# Patient Record
Sex: MALE | Race: WHITE | NOT HISPANIC OR LATINO | Employment: UNEMPLOYED | URBAN - METROPOLITAN AREA
[De-identification: names, ages, dates, MRNs, and addresses within clinical notes are randomized per-mention and may not be internally consistent; named-entity substitution may affect disease eponyms.]

---

## 2017-01-06 ENCOUNTER — COMMUNICATION - HEALTHEAST (OUTPATIENT)
Dept: FAMILY MEDICINE | Facility: CLINIC | Age: 57
End: 2017-01-06

## 2017-01-06 DIAGNOSIS — F32.A ANXIETY AND DEPRESSION: ICD-10-CM

## 2017-01-06 DIAGNOSIS — F41.9 ANXIETY AND DEPRESSION: ICD-10-CM

## 2017-01-06 DIAGNOSIS — E78.5 HYPERLIPIDEMIA: ICD-10-CM

## 2017-01-06 DIAGNOSIS — E11.9 DIABETES MELLITUS (H): ICD-10-CM

## 2017-01-09 ENCOUNTER — COMMUNICATION - HEALTHEAST (OUTPATIENT)
Dept: NURSING | Facility: CLINIC | Age: 57
End: 2017-01-09

## 2017-01-09 DIAGNOSIS — Z86.718 HISTORY OF DVT (DEEP VEIN THROMBOSIS): ICD-10-CM

## 2017-01-13 ENCOUNTER — AMBULATORY - HEALTHEAST (OUTPATIENT)
Dept: NURSING | Facility: CLINIC | Age: 57
End: 2017-01-13

## 2017-01-16 ENCOUNTER — COMMUNICATION - HEALTHEAST (OUTPATIENT)
Dept: NURSING | Facility: CLINIC | Age: 57
End: 2017-01-16

## 2017-01-16 DIAGNOSIS — Z86.718 HISTORY OF DVT (DEEP VEIN THROMBOSIS): ICD-10-CM

## 2017-01-19 ENCOUNTER — RECORDS - HEALTHEAST (OUTPATIENT)
Dept: ADMINISTRATIVE | Facility: OTHER | Age: 57
End: 2017-01-19

## 2017-01-22 ENCOUNTER — COMMUNICATION - HEALTHEAST (OUTPATIENT)
Dept: FAMILY MEDICINE | Facility: CLINIC | Age: 57
End: 2017-01-22

## 2017-01-27 ENCOUNTER — AMBULATORY - HEALTHEAST (OUTPATIENT)
Dept: FAMILY MEDICINE | Facility: CLINIC | Age: 57
End: 2017-01-27

## 2017-01-27 ENCOUNTER — COMMUNICATION - HEALTHEAST (OUTPATIENT)
Dept: INTERNAL MEDICINE | Facility: CLINIC | Age: 57
End: 2017-01-27

## 2017-01-27 DIAGNOSIS — Z86.718 HISTORY OF DVT (DEEP VEIN THROMBOSIS): ICD-10-CM

## 2017-01-27 DIAGNOSIS — Z79.899 MEDICATION MANAGEMENT: ICD-10-CM

## 2017-02-02 ENCOUNTER — COMMUNICATION - HEALTHEAST (OUTPATIENT)
Dept: FAMILY MEDICINE | Facility: CLINIC | Age: 57
End: 2017-02-02

## 2017-02-02 DIAGNOSIS — E11.9 DIABETES MELLITUS (H): ICD-10-CM

## 2017-02-06 ENCOUNTER — COMMUNICATION - HEALTHEAST (OUTPATIENT)
Dept: FAMILY MEDICINE | Facility: CLINIC | Age: 57
End: 2017-02-06

## 2017-02-06 DIAGNOSIS — F41.9 ANXIETY AND DEPRESSION: ICD-10-CM

## 2017-02-06 DIAGNOSIS — F32.A ANXIETY AND DEPRESSION: ICD-10-CM

## 2017-02-07 ENCOUNTER — AMBULATORY - HEALTHEAST (OUTPATIENT)
Dept: FAMILY MEDICINE | Facility: CLINIC | Age: 57
End: 2017-02-07

## 2017-02-07 DIAGNOSIS — E78.2 MIXED HYPERLIPIDEMIA: ICD-10-CM

## 2017-02-07 DIAGNOSIS — E11.8 TYPE 2 DIABETES MELLITUS WITH COMPLICATION, WITHOUT LONG-TERM CURRENT USE OF INSULIN (H): ICD-10-CM

## 2017-02-10 ENCOUNTER — COMMUNICATION - HEALTHEAST (OUTPATIENT)
Dept: INTERNAL MEDICINE | Facility: CLINIC | Age: 57
End: 2017-02-10

## 2017-02-13 ENCOUNTER — AMBULATORY - HEALTHEAST (OUTPATIENT)
Dept: NURSING | Facility: CLINIC | Age: 57
End: 2017-02-13

## 2017-02-13 ENCOUNTER — COMMUNICATION - HEALTHEAST (OUTPATIENT)
Dept: NURSING | Facility: CLINIC | Age: 57
End: 2017-02-13

## 2017-02-13 DIAGNOSIS — Z86.718 HISTORY OF DVT (DEEP VEIN THROMBOSIS): ICD-10-CM

## 2017-02-22 ENCOUNTER — RECORDS - HEALTHEAST (OUTPATIENT)
Dept: ADMINISTRATIVE | Facility: OTHER | Age: 57
End: 2017-02-22

## 2017-02-24 ENCOUNTER — COMMUNICATION - HEALTHEAST (OUTPATIENT)
Dept: FAMILY MEDICINE | Facility: CLINIC | Age: 57
End: 2017-02-24

## 2017-02-24 DIAGNOSIS — Z86.718 HISTORY OF DVT (DEEP VEIN THROMBOSIS): ICD-10-CM

## 2017-03-02 ENCOUNTER — COMMUNICATION - HEALTHEAST (OUTPATIENT)
Dept: NURSING | Facility: CLINIC | Age: 57
End: 2017-03-02

## 2017-03-02 DIAGNOSIS — E11.9 DIABETES MELLITUS (H): ICD-10-CM

## 2017-03-13 ENCOUNTER — COMMUNICATION - HEALTHEAST (OUTPATIENT)
Dept: NURSING | Facility: CLINIC | Age: 57
End: 2017-03-13

## 2017-03-13 DIAGNOSIS — Z86.718 HISTORY OF DVT (DEEP VEIN THROMBOSIS): ICD-10-CM

## 2017-03-14 ENCOUNTER — AMBULATORY - HEALTHEAST (OUTPATIENT)
Dept: NURSING | Facility: CLINIC | Age: 57
End: 2017-03-14

## 2017-03-22 ENCOUNTER — COMMUNICATION - HEALTHEAST (OUTPATIENT)
Dept: NURSING | Facility: CLINIC | Age: 57
End: 2017-03-22

## 2017-03-22 DIAGNOSIS — E78.5 DYSLIPIDEMIA: ICD-10-CM

## 2017-03-23 ENCOUNTER — COMMUNICATION - HEALTHEAST (OUTPATIENT)
Dept: NURSING | Facility: CLINIC | Age: 57
End: 2017-03-23

## 2017-03-23 DIAGNOSIS — Z86.718 HISTORY OF DVT (DEEP VEIN THROMBOSIS): ICD-10-CM

## 2017-03-28 ENCOUNTER — COMMUNICATION - HEALTHEAST (OUTPATIENT)
Dept: NURSING | Facility: CLINIC | Age: 57
End: 2017-03-28

## 2017-03-28 DIAGNOSIS — E11.9 DIABETES MELLITUS (H): ICD-10-CM

## 2017-04-06 ENCOUNTER — OFFICE VISIT - HEALTHEAST (OUTPATIENT)
Dept: FAMILY MEDICINE | Facility: CLINIC | Age: 57
End: 2017-04-06

## 2017-04-06 DIAGNOSIS — E11.8 TYPE 2 DIABETES MELLITUS WITH COMPLICATION, WITHOUT LONG-TERM CURRENT USE OF INSULIN (H): ICD-10-CM

## 2017-04-06 DIAGNOSIS — E78.2 MIXED HYPERLIPIDEMIA: ICD-10-CM

## 2017-04-06 DIAGNOSIS — Z12.5 PROSTATE CANCER SCREENING: ICD-10-CM

## 2017-04-06 DIAGNOSIS — I10 HTN (HYPERTENSION): ICD-10-CM

## 2017-04-06 DIAGNOSIS — Z86.718 HISTORY OF DVT (DEEP VEIN THROMBOSIS): ICD-10-CM

## 2017-04-06 DIAGNOSIS — F33.9 MAJOR DEPRESSIVE DISORDER, RECURRENT EPISODE (H): ICD-10-CM

## 2017-04-06 DIAGNOSIS — Z23 NEED FOR TD VACCINE: ICD-10-CM

## 2017-04-06 DIAGNOSIS — E53.8 OTHER B-COMPLEX DEFICIENCIES: ICD-10-CM

## 2017-04-06 DIAGNOSIS — R05.9 COUGH: ICD-10-CM

## 2017-04-06 DIAGNOSIS — G89.4 CHRONIC PAIN SYNDROME: ICD-10-CM

## 2017-04-06 LAB
CHOLEST SERPL-MCNC: 177 MG/DL
FASTING STATUS PATIENT QL REPORTED: YES
HBA1C MFR BLD: 8 % (ref 3.5–6.1)
HDLC SERPL-MCNC: 51 MG/DL
LDLC SERPL CALC-MCNC: 101 MG/DL
PSA SERPL-MCNC: 0.5 NG/ML (ref 0–3.5)
TRIGL SERPL-MCNC: 124 MG/DL

## 2017-04-07 ENCOUNTER — OFFICE VISIT - HEALTHEAST (OUTPATIENT)
Dept: NURSING | Facility: CLINIC | Age: 57
End: 2017-04-07

## 2017-04-07 ENCOUNTER — COMMUNICATION - HEALTHEAST (OUTPATIENT)
Dept: FAMILY MEDICINE | Facility: CLINIC | Age: 57
End: 2017-04-07

## 2017-04-07 DIAGNOSIS — I10 ESSENTIAL HYPERTENSION WITH GOAL BLOOD PRESSURE LESS THAN 140/90: ICD-10-CM

## 2017-04-07 DIAGNOSIS — Z86.718 HISTORY OF DVT (DEEP VEIN THROMBOSIS): ICD-10-CM

## 2017-04-07 DIAGNOSIS — E78.2 MIXED HYPERLIPIDEMIA: ICD-10-CM

## 2017-04-07 DIAGNOSIS — G89.4 CHRONIC PAIN SYNDROME: ICD-10-CM

## 2017-04-07 DIAGNOSIS — F33.9 EPISODE OF RECURRENT MAJOR DEPRESSIVE DISORDER, UNSPECIFIED DEPRESSION EPISODE SEVERITY (H): ICD-10-CM

## 2017-04-07 DIAGNOSIS — E11.8 TYPE 2 DIABETES MELLITUS WITH COMPLICATION, WITHOUT LONG-TERM CURRENT USE OF INSULIN (H): ICD-10-CM

## 2017-04-10 ENCOUNTER — COMMUNICATION - HEALTHEAST (OUTPATIENT)
Dept: NURSING | Facility: CLINIC | Age: 57
End: 2017-04-10

## 2017-04-10 DIAGNOSIS — Z86.718 HISTORY OF DVT (DEEP VEIN THROMBOSIS): ICD-10-CM

## 2017-04-12 ENCOUNTER — COMMUNICATION - HEALTHEAST (OUTPATIENT)
Dept: NURSING | Facility: CLINIC | Age: 57
End: 2017-04-12

## 2017-04-12 DIAGNOSIS — F32.A ANXIETY AND DEPRESSION: ICD-10-CM

## 2017-04-12 DIAGNOSIS — F41.9 ANXIETY AND DEPRESSION: ICD-10-CM

## 2017-04-14 ENCOUNTER — OFFICE VISIT - HEALTHEAST (OUTPATIENT)
Dept: NURSING | Facility: CLINIC | Age: 57
End: 2017-04-14

## 2017-04-14 ENCOUNTER — COMMUNICATION - HEALTHEAST (OUTPATIENT)
Dept: NURSING | Facility: CLINIC | Age: 57
End: 2017-04-14

## 2017-04-14 ENCOUNTER — AMBULATORY - HEALTHEAST (OUTPATIENT)
Dept: FAMILY MEDICINE | Facility: CLINIC | Age: 57
End: 2017-04-14

## 2017-04-14 DIAGNOSIS — E11.8 TYPE 2 DIABETES MELLITUS WITH COMPLICATION, WITHOUT LONG-TERM CURRENT USE OF INSULIN (H): ICD-10-CM

## 2017-04-14 DIAGNOSIS — Z86.718 HISTORY OF DVT (DEEP VEIN THROMBOSIS): ICD-10-CM

## 2017-04-17 ENCOUNTER — COMMUNICATION - HEALTHEAST (OUTPATIENT)
Dept: INTERNAL MEDICINE | Facility: CLINIC | Age: 57
End: 2017-04-17

## 2017-04-17 DIAGNOSIS — I73.9 PERIPHERAL VASCULAR DISEASE, UNSPECIFIED (H): ICD-10-CM

## 2017-04-17 DIAGNOSIS — Z86.718 HISTORY OF DVT (DEEP VEIN THROMBOSIS): ICD-10-CM

## 2017-04-26 ENCOUNTER — COMMUNICATION - HEALTHEAST (OUTPATIENT)
Dept: NURSING | Facility: CLINIC | Age: 57
End: 2017-04-26

## 2017-04-26 DIAGNOSIS — E11.9 DIABETES MELLITUS (H): ICD-10-CM

## 2017-04-27 ENCOUNTER — COMMUNICATION - HEALTHEAST (OUTPATIENT)
Dept: NURSING | Facility: CLINIC | Age: 57
End: 2017-04-27

## 2017-04-27 DIAGNOSIS — Z86.718 HISTORY OF DVT (DEEP VEIN THROMBOSIS): ICD-10-CM

## 2017-04-28 ENCOUNTER — OFFICE VISIT - HEALTHEAST (OUTPATIENT)
Dept: NURSING | Facility: CLINIC | Age: 57
End: 2017-04-28

## 2017-04-28 DIAGNOSIS — E11.8 TYPE 2 DIABETES MELLITUS WITH COMPLICATION, WITHOUT LONG-TERM CURRENT USE OF INSULIN (H): ICD-10-CM

## 2017-04-28 DIAGNOSIS — E78.2 MIXED HYPERLIPIDEMIA: ICD-10-CM

## 2017-04-28 DIAGNOSIS — G89.4 CHRONIC PAIN SYNDROME: ICD-10-CM

## 2017-04-28 DIAGNOSIS — I10 ESSENTIAL HYPERTENSION WITH GOAL BLOOD PRESSURE LESS THAN 140/90: ICD-10-CM

## 2017-05-12 ENCOUNTER — COMMUNICATION - HEALTHEAST (OUTPATIENT)
Dept: NURSING | Facility: CLINIC | Age: 57
End: 2017-05-12

## 2017-05-12 DIAGNOSIS — Z86.718 HISTORY OF DVT (DEEP VEIN THROMBOSIS): ICD-10-CM

## 2017-05-15 ENCOUNTER — AMBULATORY - HEALTHEAST (OUTPATIENT)
Dept: NURSING | Facility: CLINIC | Age: 57
End: 2017-05-15

## 2017-05-16 ENCOUNTER — COMMUNICATION - HEALTHEAST (OUTPATIENT)
Dept: FAMILY MEDICINE | Facility: CLINIC | Age: 57
End: 2017-05-16

## 2017-05-24 ENCOUNTER — COMMUNICATION - HEALTHEAST (OUTPATIENT)
Dept: FAMILY MEDICINE | Facility: CLINIC | Age: 57
End: 2017-05-24

## 2017-05-24 DIAGNOSIS — E11.9 DIABETES MELLITUS (H): ICD-10-CM

## 2017-05-31 ENCOUNTER — COMMUNICATION - HEALTHEAST (OUTPATIENT)
Dept: FAMILY MEDICINE | Facility: CLINIC | Age: 57
End: 2017-05-31

## 2017-05-31 DIAGNOSIS — E78.5 DYSLIPIDEMIA: ICD-10-CM

## 2017-06-02 ENCOUNTER — COMMUNICATION - HEALTHEAST (OUTPATIENT)
Dept: INTERNAL MEDICINE | Facility: CLINIC | Age: 57
End: 2017-06-02

## 2017-06-07 ENCOUNTER — COMMUNICATION - HEALTHEAST (OUTPATIENT)
Dept: NURSING | Facility: CLINIC | Age: 57
End: 2017-06-07

## 2017-06-07 DIAGNOSIS — Z86.718 HISTORY OF DVT (DEEP VEIN THROMBOSIS): ICD-10-CM

## 2017-06-28 ENCOUNTER — COMMUNICATION - HEALTHEAST (OUTPATIENT)
Dept: INTERNAL MEDICINE | Facility: CLINIC | Age: 57
End: 2017-06-28

## 2017-07-10 ENCOUNTER — RECORDS - HEALTHEAST (OUTPATIENT)
Dept: ADMINISTRATIVE | Facility: OTHER | Age: 57
End: 2017-07-10

## 2017-07-13 ENCOUNTER — COMMUNICATION - HEALTHEAST (OUTPATIENT)
Dept: FAMILY MEDICINE | Facility: CLINIC | Age: 57
End: 2017-07-13

## 2017-07-13 ENCOUNTER — OFFICE VISIT - HEALTHEAST (OUTPATIENT)
Dept: FAMILY MEDICINE | Facility: CLINIC | Age: 57
End: 2017-07-13

## 2017-07-13 ENCOUNTER — COMMUNICATION - HEALTHEAST (OUTPATIENT)
Dept: INTERNAL MEDICINE | Facility: CLINIC | Age: 57
End: 2017-07-13

## 2017-07-13 ENCOUNTER — RECORDS - HEALTHEAST (OUTPATIENT)
Dept: GENERAL RADIOLOGY | Facility: CLINIC | Age: 57
End: 2017-07-13

## 2017-07-13 DIAGNOSIS — J45.20 MILD INTERMITTENT ASTHMA WITHOUT COMPLICATION: ICD-10-CM

## 2017-07-13 DIAGNOSIS — E11.8 TYPE 2 DIABETES MELLITUS WITH COMPLICATION, WITHOUT LONG-TERM CURRENT USE OF INSULIN (H): ICD-10-CM

## 2017-07-13 DIAGNOSIS — Z86.718 HISTORY OF DVT (DEEP VEIN THROMBOSIS): ICD-10-CM

## 2017-07-13 DIAGNOSIS — I73.9 PERIPHERAL VASCULAR DISEASE, UNSPECIFIED (H): ICD-10-CM

## 2017-07-13 DIAGNOSIS — K62.3 RECTAL PROLAPSE: ICD-10-CM

## 2017-07-13 DIAGNOSIS — E53.8 B12 DEFICIENCY: ICD-10-CM

## 2017-07-13 DIAGNOSIS — E78.2 MIXED HYPERLIPIDEMIA: ICD-10-CM

## 2017-07-13 DIAGNOSIS — G47.00 INSOMNIA, UNSPECIFIED: ICD-10-CM

## 2017-07-13 DIAGNOSIS — E11.9 DIABETES (H): ICD-10-CM

## 2017-07-13 DIAGNOSIS — F10.20 ETOHISM (H): ICD-10-CM

## 2017-07-13 DIAGNOSIS — M25.572 ANKLE PAIN, LEFT: ICD-10-CM

## 2017-07-13 DIAGNOSIS — I10 HTN (HYPERTENSION): ICD-10-CM

## 2017-07-13 DIAGNOSIS — G89.4 CHRONIC PAIN SYNDROME: ICD-10-CM

## 2017-07-13 DIAGNOSIS — M25.572 PAIN IN LEFT ANKLE AND JOINTS OF LEFT FOOT: ICD-10-CM

## 2017-07-13 DIAGNOSIS — F33.9 MAJOR DEPRESSIVE DISORDER, RECURRENT EPISODE (H): ICD-10-CM

## 2017-07-13 LAB
CHOLEST SERPL-MCNC: 188 MG/DL
FASTING STATUS PATIENT QL REPORTED: YES
HBA1C MFR BLD: 6.1 % (ref 3.5–6.1)
HDLC SERPL-MCNC: 52 MG/DL
LDLC SERPL CALC-MCNC: 119 MG/DL
TRIGL SERPL-MCNC: 87 MG/DL

## 2017-07-26 ENCOUNTER — COMMUNICATION - HEALTHEAST (OUTPATIENT)
Dept: INTERNAL MEDICINE | Facility: CLINIC | Age: 57
End: 2017-07-26

## 2017-07-26 DIAGNOSIS — Z86.718 HISTORY OF DVT (DEEP VEIN THROMBOSIS): ICD-10-CM

## 2017-07-27 ENCOUNTER — COMMUNICATION - HEALTHEAST (OUTPATIENT)
Dept: FAMILY MEDICINE | Facility: CLINIC | Age: 57
End: 2017-07-27

## 2017-08-08 ENCOUNTER — COMMUNICATION - HEALTHEAST (OUTPATIENT)
Dept: INTERNAL MEDICINE | Facility: CLINIC | Age: 57
End: 2017-08-08

## 2017-08-08 DIAGNOSIS — Z86.718 HISTORY OF DVT (DEEP VEIN THROMBOSIS): ICD-10-CM

## 2017-08-29 ENCOUNTER — COMMUNICATION - HEALTHEAST (OUTPATIENT)
Dept: FAMILY MEDICINE | Facility: CLINIC | Age: 57
End: 2017-08-29

## 2017-08-30 ENCOUNTER — COMMUNICATION - HEALTHEAST (OUTPATIENT)
Dept: FAMILY MEDICINE | Facility: CLINIC | Age: 57
End: 2017-08-30

## 2017-08-30 DIAGNOSIS — Z86.718 HISTORY OF DVT (DEEP VEIN THROMBOSIS): ICD-10-CM

## 2017-09-11 ENCOUNTER — COMMUNICATION - HEALTHEAST (OUTPATIENT)
Dept: FAMILY MEDICINE | Facility: CLINIC | Age: 57
End: 2017-09-11

## 2017-09-13 ENCOUNTER — COMMUNICATION - HEALTHEAST (OUTPATIENT)
Dept: NURSING | Facility: CLINIC | Age: 57
End: 2017-09-13

## 2017-09-13 DIAGNOSIS — Z86.718 HISTORY OF DVT (DEEP VEIN THROMBOSIS): ICD-10-CM

## 2017-09-21 ENCOUNTER — COMMUNICATION - HEALTHEAST (OUTPATIENT)
Dept: FAMILY MEDICINE | Facility: CLINIC | Age: 57
End: 2017-09-21

## 2017-09-27 ENCOUNTER — COMMUNICATION - HEALTHEAST (OUTPATIENT)
Dept: FAMILY MEDICINE | Facility: CLINIC | Age: 57
End: 2017-09-27

## 2017-09-27 DIAGNOSIS — F41.9 ANXIETY AND DEPRESSION: ICD-10-CM

## 2017-09-27 DIAGNOSIS — F32.A ANXIETY AND DEPRESSION: ICD-10-CM

## 2017-09-28 ENCOUNTER — COMMUNICATION - HEALTHEAST (OUTPATIENT)
Dept: NURSING | Facility: CLINIC | Age: 57
End: 2017-09-28

## 2017-09-28 DIAGNOSIS — Z86.718 HISTORY OF DVT (DEEP VEIN THROMBOSIS): ICD-10-CM

## 2017-09-29 ENCOUNTER — AMBULATORY - HEALTHEAST (OUTPATIENT)
Dept: NURSING | Facility: CLINIC | Age: 57
End: 2017-09-29

## 2017-10-03 ENCOUNTER — COMMUNICATION - HEALTHEAST (OUTPATIENT)
Dept: NURSING | Facility: CLINIC | Age: 57
End: 2017-10-03

## 2017-10-03 ENCOUNTER — RECORDS - HEALTHEAST (OUTPATIENT)
Dept: ADMINISTRATIVE | Facility: OTHER | Age: 57
End: 2017-10-03

## 2017-10-03 DIAGNOSIS — I10 ESSENTIAL HYPERTENSION: ICD-10-CM

## 2017-10-16 ENCOUNTER — COMMUNICATION - HEALTHEAST (OUTPATIENT)
Dept: NURSING | Facility: CLINIC | Age: 57
End: 2017-10-16

## 2017-10-16 DIAGNOSIS — Z86.718 HISTORY OF DVT (DEEP VEIN THROMBOSIS): ICD-10-CM

## 2017-10-16 DIAGNOSIS — E11.9 DIABETES MELLITUS (H): ICD-10-CM

## 2017-10-16 DIAGNOSIS — E11.8 TYPE 2 DIABETES MELLITUS WITH COMPLICATION, WITHOUT LONG-TERM CURRENT USE OF INSULIN (H): ICD-10-CM

## 2017-10-17 ENCOUNTER — COMMUNICATION - HEALTHEAST (OUTPATIENT)
Dept: NURSING | Facility: CLINIC | Age: 57
End: 2017-10-17

## 2017-10-18 RX ORDER — LANCING DEVICE/LANCETS
KIT MISCELLANEOUS
Qty: 102 EACH | Refills: 3 | Status: SHIPPED | OUTPATIENT
Start: 2017-10-18

## 2017-10-18 RX ORDER — BLOOD SUGAR DIAGNOSTIC
STRIP MISCELLANEOUS
Qty: 100 STRIP | Refills: 3 | Status: SHIPPED | OUTPATIENT
Start: 2017-10-18

## 2017-10-25 ENCOUNTER — RECORDS - HEALTHEAST (OUTPATIENT)
Dept: ADMINISTRATIVE | Facility: OTHER | Age: 57
End: 2017-10-25

## 2017-11-06 ENCOUNTER — COMMUNICATION - HEALTHEAST (OUTPATIENT)
Dept: INTERNAL MEDICINE | Facility: CLINIC | Age: 57
End: 2017-11-06

## 2017-11-09 ENCOUNTER — COMMUNICATION - HEALTHEAST (OUTPATIENT)
Dept: NURSING | Facility: CLINIC | Age: 57
End: 2017-11-09

## 2017-11-09 DIAGNOSIS — Z86.718 HISTORY OF DVT (DEEP VEIN THROMBOSIS): ICD-10-CM

## 2017-11-18 ENCOUNTER — COMMUNICATION - HEALTHEAST (OUTPATIENT)
Dept: NURSING | Facility: CLINIC | Age: 57
End: 2017-11-18

## 2017-11-22 ENCOUNTER — COMMUNICATION - HEALTHEAST (OUTPATIENT)
Dept: NURSING | Facility: CLINIC | Age: 57
End: 2017-11-22

## 2017-11-22 DIAGNOSIS — Z86.718 HISTORY OF DVT (DEEP VEIN THROMBOSIS): ICD-10-CM

## 2017-12-06 ENCOUNTER — COMMUNICATION - HEALTHEAST (OUTPATIENT)
Dept: NURSING | Facility: CLINIC | Age: 57
End: 2017-12-06

## 2017-12-06 DIAGNOSIS — Z86.718 HISTORY OF DVT (DEEP VEIN THROMBOSIS): ICD-10-CM

## 2017-12-19 ENCOUNTER — COMMUNICATION - HEALTHEAST (OUTPATIENT)
Dept: NURSING | Facility: CLINIC | Age: 57
End: 2017-12-19

## 2017-12-19 DIAGNOSIS — E78.5 DYSLIPIDEMIA: ICD-10-CM

## 2017-12-20 ENCOUNTER — OFFICE VISIT - HEALTHEAST (OUTPATIENT)
Dept: FAMILY MEDICINE | Facility: CLINIC | Age: 57
End: 2017-12-20

## 2017-12-20 DIAGNOSIS — G89.4 CHRONIC PAIN SYNDROME: ICD-10-CM

## 2017-12-20 DIAGNOSIS — G62.9 NEUROPATHY: ICD-10-CM

## 2017-12-20 DIAGNOSIS — E11.9 DM (DIABETES MELLITUS) (H): ICD-10-CM

## 2017-12-20 DIAGNOSIS — I10 ESSENTIAL HYPERTENSION: ICD-10-CM

## 2017-12-20 DIAGNOSIS — E11.8 TYPE 2 DIABETES MELLITUS WITH COMPLICATION, WITHOUT LONG-TERM CURRENT USE OF INSULIN (H): ICD-10-CM

## 2017-12-20 DIAGNOSIS — Z23 NEED FOR IMMUNIZATION AGAINST INFLUENZA: ICD-10-CM

## 2017-12-20 LAB — HBA1C MFR BLD: 6.8 % (ref 3.5–6.1)

## 2017-12-27 ENCOUNTER — COMMUNICATION - HEALTHEAST (OUTPATIENT)
Dept: INTERNAL MEDICINE | Facility: CLINIC | Age: 57
End: 2017-12-27

## 2018-01-08 ENCOUNTER — COMMUNICATION - HEALTHEAST (OUTPATIENT)
Dept: NURSING | Facility: CLINIC | Age: 58
End: 2018-01-08

## 2018-01-08 DIAGNOSIS — Z86.718 HISTORY OF DVT (DEEP VEIN THROMBOSIS): ICD-10-CM

## 2018-01-08 LAB — INR PPP: 2.3 (ref 0.9–1.1)

## 2018-01-10 ENCOUNTER — RECORDS - HEALTHEAST (OUTPATIENT)
Dept: ADMINISTRATIVE | Facility: OTHER | Age: 58
End: 2018-01-10

## 2018-01-15 ENCOUNTER — AMBULATORY - HEALTHEAST (OUTPATIENT)
Dept: NURSING | Facility: CLINIC | Age: 58
End: 2018-01-15

## 2018-01-15 ENCOUNTER — COMMUNICATION - HEALTHEAST (OUTPATIENT)
Dept: FAMILY MEDICINE | Facility: CLINIC | Age: 58
End: 2018-01-15

## 2018-01-15 DIAGNOSIS — E11.8 TYPE 2 DIABETES MELLITUS WITH COMPLICATION, WITHOUT LONG-TERM CURRENT USE OF INSULIN (H): ICD-10-CM

## 2018-01-17 ENCOUNTER — RECORDS - HEALTHEAST (OUTPATIENT)
Dept: ADMINISTRATIVE | Facility: OTHER | Age: 58
End: 2018-01-17

## 2018-01-21 ENCOUNTER — AMBULATORY - HEALTHEAST (OUTPATIENT)
Dept: VASCULAR SURGERY | Facility: CLINIC | Age: 58
End: 2018-01-21

## 2018-01-21 DIAGNOSIS — I73.9 PAD (PERIPHERAL ARTERY DISEASE) (H): ICD-10-CM

## 2018-01-24 ENCOUNTER — RECORDS - HEALTHEAST (OUTPATIENT)
Dept: VASCULAR ULTRASOUND | Facility: CLINIC | Age: 58
End: 2018-01-24

## 2018-01-24 ENCOUNTER — RECORDS - HEALTHEAST (OUTPATIENT)
Dept: ADMINISTRATIVE | Facility: OTHER | Age: 58
End: 2018-01-24

## 2018-01-24 DIAGNOSIS — I73.9 PERIPHERAL VASCULAR DISEASE, UNSPECIFIED (H): ICD-10-CM

## 2018-01-27 ENCOUNTER — RECORDS - HEALTHEAST (OUTPATIENT)
Dept: ADMINISTRATIVE | Facility: OTHER | Age: 58
End: 2018-01-27

## 2018-01-28 LAB — INR PPP: 2.3 (ref 0.9–1.1)

## 2018-01-29 ENCOUNTER — COMMUNICATION - HEALTHEAST (OUTPATIENT)
Dept: INTERNAL MEDICINE | Facility: CLINIC | Age: 58
End: 2018-01-29

## 2018-01-30 ENCOUNTER — COMMUNICATION - HEALTHEAST (OUTPATIENT)
Dept: NURSING | Facility: CLINIC | Age: 58
End: 2018-01-30

## 2018-01-30 DIAGNOSIS — Z86.718 HISTORY OF DVT (DEEP VEIN THROMBOSIS): ICD-10-CM

## 2018-01-31 ENCOUNTER — OFFICE VISIT - HEALTHEAST (OUTPATIENT)
Dept: PODIATRY | Facility: CLINIC | Age: 58
End: 2018-01-31

## 2018-01-31 ENCOUNTER — OFFICE VISIT - HEALTHEAST (OUTPATIENT)
Dept: VASCULAR SURGERY | Facility: CLINIC | Age: 58
End: 2018-01-31

## 2018-01-31 DIAGNOSIS — E11.40 DIABETIC NEUROPATHY ASSOCIATED WITH TYPE 2 DIABETES MELLITUS (H): ICD-10-CM

## 2018-01-31 DIAGNOSIS — I73.9 PAD (PERIPHERAL ARTERY DISEASE) (H): ICD-10-CM

## 2018-01-31 ASSESSMENT — MIFFLIN-ST. JEOR: SCORE: 1983.34

## 2018-02-01 ENCOUNTER — RECORDS - HEALTHEAST (OUTPATIENT)
Dept: ADMINISTRATIVE | Facility: OTHER | Age: 58
End: 2018-02-01

## 2018-02-07 ENCOUNTER — COMMUNICATION - HEALTHEAST (OUTPATIENT)
Dept: FAMILY MEDICINE | Facility: CLINIC | Age: 58
End: 2018-02-07

## 2018-02-14 ENCOUNTER — RECORDS - HEALTHEAST (OUTPATIENT)
Dept: ADMINISTRATIVE | Facility: OTHER | Age: 58
End: 2018-02-14

## 2018-02-19 ENCOUNTER — COMMUNICATION - HEALTHEAST (OUTPATIENT)
Dept: FAMILY MEDICINE | Facility: CLINIC | Age: 58
End: 2018-02-19

## 2018-02-20 ENCOUNTER — COMMUNICATION - HEALTHEAST (OUTPATIENT)
Dept: INTERNAL MEDICINE | Facility: CLINIC | Age: 58
End: 2018-02-20

## 2018-02-26 ENCOUNTER — COMMUNICATION - HEALTHEAST (OUTPATIENT)
Dept: NURSING | Facility: CLINIC | Age: 58
End: 2018-02-26

## 2018-02-26 DIAGNOSIS — Z86.718 HISTORY OF DVT (DEEP VEIN THROMBOSIS): ICD-10-CM

## 2018-02-26 LAB — INR PPP: 2.2 (ref 0.9–1.1)

## 2018-03-06 ENCOUNTER — COMMUNICATION - HEALTHEAST (OUTPATIENT)
Dept: INTERNAL MEDICINE | Facility: CLINIC | Age: 58
End: 2018-03-06

## 2018-03-06 DIAGNOSIS — Z86.718 HISTORY OF DVT (DEEP VEIN THROMBOSIS): ICD-10-CM

## 2018-03-07 ENCOUNTER — RECORDS - HEALTHEAST (OUTPATIENT)
Dept: ADMINISTRATIVE | Facility: OTHER | Age: 58
End: 2018-03-07

## 2018-03-12 ENCOUNTER — AMBULATORY - HEALTHEAST (OUTPATIENT)
Dept: NURSING | Facility: CLINIC | Age: 58
End: 2018-03-12

## 2018-03-12 ENCOUNTER — COMMUNICATION - HEALTHEAST (OUTPATIENT)
Dept: FAMILY MEDICINE | Facility: CLINIC | Age: 58
End: 2018-03-12

## 2018-03-14 ENCOUNTER — COMMUNICATION - HEALTHEAST (OUTPATIENT)
Dept: FAMILY MEDICINE | Facility: CLINIC | Age: 58
End: 2018-03-14

## 2018-03-14 DIAGNOSIS — E11.9 DIABETES MELLITUS (H): ICD-10-CM

## 2018-03-19 ENCOUNTER — COMMUNICATION - HEALTHEAST (OUTPATIENT)
Dept: INTERNAL MEDICINE | Facility: CLINIC | Age: 58
End: 2018-03-19

## 2018-03-19 DIAGNOSIS — Z86.718 HISTORY OF DVT (DEEP VEIN THROMBOSIS): ICD-10-CM

## 2018-03-21 ENCOUNTER — RECORDS - HEALTHEAST (OUTPATIENT)
Dept: ADMINISTRATIVE | Facility: OTHER | Age: 58
End: 2018-03-21

## 2018-03-26 ENCOUNTER — COMMUNICATION - HEALTHEAST (OUTPATIENT)
Dept: FAMILY MEDICINE | Facility: CLINIC | Age: 58
End: 2018-03-26

## 2018-03-28 ENCOUNTER — COMMUNICATION - HEALTHEAST (OUTPATIENT)
Dept: INTERNAL MEDICINE | Facility: CLINIC | Age: 58
End: 2018-03-28

## 2018-03-28 DIAGNOSIS — Z86.718 HISTORY OF DVT (DEEP VEIN THROMBOSIS): ICD-10-CM

## 2018-03-28 LAB — INR PPP: 2.2 (ref 0.9–1.1)

## 2018-04-18 ENCOUNTER — COMMUNICATION - HEALTHEAST (OUTPATIENT)
Dept: FAMILY MEDICINE | Facility: CLINIC | Age: 58
End: 2018-04-18

## 2018-04-18 ENCOUNTER — COMMUNICATION - HEALTHEAST (OUTPATIENT)
Dept: INTERNAL MEDICINE | Facility: CLINIC | Age: 58
End: 2018-04-18

## 2018-04-26 ENCOUNTER — COMMUNICATION - HEALTHEAST (OUTPATIENT)
Dept: ANTICOAGULATION | Facility: CLINIC | Age: 58
End: 2018-04-26

## 2018-04-26 DIAGNOSIS — Z86.718 HISTORY OF DVT (DEEP VEIN THROMBOSIS): ICD-10-CM

## 2018-04-26 LAB — INR PPP: 2.3 (ref 0.9–1.1)

## 2018-04-30 ENCOUNTER — COMMUNICATION - HEALTHEAST (OUTPATIENT)
Dept: FAMILY MEDICINE | Facility: CLINIC | Age: 58
End: 2018-04-30

## 2018-05-02 ENCOUNTER — RECORDS - HEALTHEAST (OUTPATIENT)
Dept: ADMINISTRATIVE | Facility: OTHER | Age: 58
End: 2018-05-02

## 2018-05-04 ENCOUNTER — COMMUNICATION - HEALTHEAST (OUTPATIENT)
Dept: FAMILY MEDICINE | Facility: CLINIC | Age: 58
End: 2018-05-04

## 2018-05-07 ENCOUNTER — OFFICE VISIT - HEALTHEAST (OUTPATIENT)
Dept: FAMILY MEDICINE | Facility: CLINIC | Age: 58
End: 2018-05-07

## 2018-05-07 DIAGNOSIS — E78.2 MIXED HYPERLIPIDEMIA: ICD-10-CM

## 2018-05-07 DIAGNOSIS — F41.9 ANXIETY: ICD-10-CM

## 2018-05-07 DIAGNOSIS — E11.9 DM (DIABETES MELLITUS) (H): ICD-10-CM

## 2018-05-07 DIAGNOSIS — Z86.718 HISTORY OF DVT (DEEP VEIN THROMBOSIS): ICD-10-CM

## 2018-05-07 DIAGNOSIS — J45.20 MILD INTERMITTENT ASTHMA WITHOUT COMPLICATION: ICD-10-CM

## 2018-05-07 DIAGNOSIS — I10 ESSENTIAL HYPERTENSION: ICD-10-CM

## 2018-05-07 DIAGNOSIS — E53.8 B12 DEFICIENCY: ICD-10-CM

## 2018-05-07 DIAGNOSIS — F12.10 CANNABIS ABUSE: ICD-10-CM

## 2018-05-07 DIAGNOSIS — F33.9 MAJOR DEPRESSIVE DISORDER, RECURRENT EPISODE (H): ICD-10-CM

## 2018-05-07 DIAGNOSIS — M54.2 NECK PAIN: ICD-10-CM

## 2018-05-07 LAB
HBA1C MFR BLD: 6.7 % (ref 3.5–6.1)
VIT B12 SERPL-MCNC: 371 PG/ML (ref 213–816)

## 2018-05-08 ENCOUNTER — COMMUNICATION - HEALTHEAST (OUTPATIENT)
Dept: FAMILY MEDICINE | Facility: CLINIC | Age: 58
End: 2018-05-08

## 2018-05-14 ENCOUNTER — HOSPITAL ENCOUNTER (OUTPATIENT)
Dept: CT IMAGING | Facility: CLINIC | Age: 58
Discharge: HOME | End: 2018-05-14
Attending: SURGERY

## 2018-05-14 ENCOUNTER — OFFICE VISIT - HEALTHEAST (OUTPATIENT)
Dept: SURGERY | Facility: CLINIC | Age: 58
End: 2018-05-14

## 2018-05-14 DIAGNOSIS — E66.9 OBESE: ICD-10-CM

## 2018-05-14 DIAGNOSIS — R10.31 GROIN PAIN, RIGHT: ICD-10-CM

## 2018-05-14 DIAGNOSIS — S81.001S OPEN WOUND OF RIGHT KNEE, LEG, AND ANKLE, SEQUELA: ICD-10-CM

## 2018-05-14 DIAGNOSIS — S91.001S OPEN WOUND OF RIGHT KNEE, LEG, AND ANKLE, SEQUELA: ICD-10-CM

## 2018-05-14 DIAGNOSIS — S81.801S OPEN WOUND OF RIGHT KNEE, LEG, AND ANKLE, SEQUELA: ICD-10-CM

## 2018-05-14 ASSESSMENT — MIFFLIN-ST. JEOR: SCORE: 1987.88

## 2018-05-15 ENCOUNTER — RECORDS - HEALTHEAST (OUTPATIENT)
Dept: ADMINISTRATIVE | Facility: OTHER | Age: 58
End: 2018-05-15

## 2018-05-17 ENCOUNTER — COMMUNICATION - HEALTHEAST (OUTPATIENT)
Dept: INTERNAL MEDICINE | Facility: CLINIC | Age: 58
End: 2018-05-17

## 2018-05-18 ENCOUNTER — COMMUNICATION - HEALTHEAST (OUTPATIENT)
Dept: SURGERY | Facility: CLINIC | Age: 58
End: 2018-05-18

## 2018-05-21 ENCOUNTER — COMMUNICATION - HEALTHEAST (OUTPATIENT)
Dept: SURGERY | Facility: CLINIC | Age: 58
End: 2018-05-21

## 2018-05-21 ENCOUNTER — OFFICE VISIT - HEALTHEAST (OUTPATIENT)
Dept: FAMILY MEDICINE | Facility: CLINIC | Age: 58
End: 2018-05-21

## 2018-05-21 DIAGNOSIS — K74.60 CIRRHOSIS (H): ICD-10-CM

## 2018-05-21 DIAGNOSIS — Z78.9 ALCOHOL USE: ICD-10-CM

## 2018-05-21 DIAGNOSIS — N28.1 KIDNEY CYSTS: ICD-10-CM

## 2018-05-21 DIAGNOSIS — R59.0 LYMPHADENOPATHY, INGUINAL: ICD-10-CM

## 2018-05-21 LAB
ALBUMIN SERPL-MCNC: 4 G/DL (ref 3.5–5)
ALP SERPL-CCNC: 64 U/L (ref 45–120)
ALT SERPL W P-5'-P-CCNC: 26 U/L (ref 0–45)
ANION GAP SERPL CALCULATED.3IONS-SCNC: 12 MMOL/L (ref 5–18)
AST SERPL W P-5'-P-CCNC: 27 U/L (ref 0–40)
BILIRUB SERPL-MCNC: 0.5 MG/DL (ref 0–1)
BUN SERPL-MCNC: 10 MG/DL (ref 8–22)
CALCIUM SERPL-MCNC: 9.6 MG/DL (ref 8.5–10.5)
CHLORIDE BLD-SCNC: 102 MMOL/L (ref 98–107)
CO2 SERPL-SCNC: 24 MMOL/L (ref 22–31)
CREAT SERPL-MCNC: 0.8 MG/DL (ref 0.7–1.3)
ERYTHROCYTE [DISTWIDTH] IN BLOOD BY AUTOMATED COUNT: 13.1 % (ref 11–14.5)
GFR SERPL CREATININE-BSD FRML MDRD: >60 ML/MIN/1.73M2
GLUCOSE BLD-MCNC: 97 MG/DL (ref 70–125)
HCT VFR BLD AUTO: 44.2 % (ref 40–54)
HGB BLD-MCNC: 14.7 G/DL (ref 14–18)
MCH RBC QN AUTO: 30.4 PG (ref 27–34)
MCHC RBC AUTO-ENTMCNC: 33.2 G/DL (ref 32–36)
MCV RBC AUTO: 92 FL (ref 80–100)
PLATELET # BLD AUTO: 151 THOU/UL (ref 140–440)
PMV BLD AUTO: 7.7 FL (ref 7–10)
POTASSIUM BLD-SCNC: 4.3 MMOL/L (ref 3.5–5)
PROT SERPL-MCNC: 7.4 G/DL (ref 6–8)
PSA SERPL-MCNC: 0.4 NG/ML (ref 0–3.5)
RBC # BLD AUTO: 4.83 MILL/UL (ref 4.4–6.2)
SODIUM SERPL-SCNC: 138 MMOL/L (ref 136–145)
WBC: 5.7 THOU/UL (ref 4–11)

## 2018-05-22 LAB — HCV AB SERPL QL IA: NEGATIVE

## 2018-05-23 ENCOUNTER — HOSPITAL ENCOUNTER (OUTPATIENT)
Dept: ULTRASOUND IMAGING | Facility: CLINIC | Age: 58
Discharge: HOME | End: 2018-05-23
Attending: FAMILY MEDICINE

## 2018-05-23 ENCOUNTER — HOSPITAL ENCOUNTER (OUTPATIENT)
Dept: ULTRASOUND IMAGING | Facility: CLINIC | Age: 58
Discharge: HOME | End: 2018-05-23
Attending: FAMILY MEDICINE | Admitting: RADIOLOGY

## 2018-05-23 ENCOUNTER — COMMUNICATION - HEALTHEAST (OUTPATIENT)
Dept: FAMILY MEDICINE | Facility: CLINIC | Age: 58
End: 2018-05-23

## 2018-05-23 DIAGNOSIS — N28.1 KIDNEY CYSTS: ICD-10-CM

## 2018-05-23 DIAGNOSIS — K74.60 CIRRHOSIS (H): ICD-10-CM

## 2018-05-23 DIAGNOSIS — R59.0 LYMPHADENOPATHY, INGUINAL: ICD-10-CM

## 2018-05-24 LAB
LAB AP CHARGES (HE HISTORICAL CONVERSION): NORMAL
PATH REPORT.COMMENTS IMP SPEC: NORMAL
PATH REPORT.FINAL DX SPEC: NORMAL
PATH REPORT.MICROSCOPIC SPEC OTHER STN: NORMAL
RESULT FLAG (HE HISTORICAL CONVERSION): NORMAL

## 2018-05-29 ENCOUNTER — COMMUNICATION - HEALTHEAST (OUTPATIENT)
Dept: FAMILY MEDICINE | Facility: CLINIC | Age: 58
End: 2018-05-29

## 2018-05-29 ENCOUNTER — OFFICE VISIT - HEALTHEAST (OUTPATIENT)
Dept: FAMILY MEDICINE | Facility: CLINIC | Age: 58
End: 2018-05-29

## 2018-05-29 ENCOUNTER — COMMUNICATION - HEALTHEAST (OUTPATIENT)
Dept: ANTICOAGULATION | Facility: CLINIC | Age: 58
End: 2018-05-29

## 2018-05-29 DIAGNOSIS — M54.2 NECK PAIN: ICD-10-CM

## 2018-05-29 DIAGNOSIS — T30.0 BURN: ICD-10-CM

## 2018-05-30 LAB
LAB AP CHARGES (HE HISTORICAL CONVERSION): NORMAL
LAB AP INITIAL CYTO EVAL (HE HISTORICAL CONVERSION): NORMAL
LAB MED GENERAL PATH INTERP (HE HISTORICAL CONVERSION): NORMAL
PATH REPORT.ADDENDUM SPEC: NORMAL
PATH REPORT.COMMENTS IMP SPEC: NORMAL
PATH REPORT.COMMENTS IMP SPEC: NORMAL
PATH REPORT.FINAL DX SPEC: NORMAL
PATH REPORT.MICROSCOPIC SPEC OTHER STN: NORMAL
PATH REPORT.RELEVANT HX SPEC: NORMAL
SPECIMEN DESCRIPTION: NORMAL

## 2018-06-04 ENCOUNTER — COMMUNICATION - HEALTHEAST (OUTPATIENT)
Dept: FAMILY MEDICINE | Facility: CLINIC | Age: 58
End: 2018-06-04

## 2018-06-04 DIAGNOSIS — E11.8 TYPE 2 DIABETES MELLITUS WITH COMPLICATION, WITHOUT LONG-TERM CURRENT USE OF INSULIN (H): ICD-10-CM

## 2018-06-06 ENCOUNTER — COMMUNICATION - HEALTHEAST (OUTPATIENT)
Dept: LAB | Facility: CLINIC | Age: 58
End: 2018-06-06

## 2018-06-06 ENCOUNTER — AMBULATORY - HEALTHEAST (OUTPATIENT)
Dept: LAB | Facility: CLINIC | Age: 58
End: 2018-06-06

## 2018-06-06 DIAGNOSIS — Z86.718 HISTORY OF DVT (DEEP VEIN THROMBOSIS): ICD-10-CM

## 2018-06-06 LAB — INR PPP: 4.64 (ref 0.9–1.1)

## 2018-06-08 ENCOUNTER — COMMUNICATION - HEALTHEAST (OUTPATIENT)
Dept: ANTICOAGULATION | Facility: CLINIC | Age: 58
End: 2018-06-08

## 2018-06-08 DIAGNOSIS — Z86.718 HISTORY OF DVT (DEEP VEIN THROMBOSIS): ICD-10-CM

## 2018-06-08 LAB — INR PPP: 3.4 (ref 0.9–1.1)

## 2018-06-11 ENCOUNTER — COMMUNICATION - HEALTHEAST (OUTPATIENT)
Dept: ANTICOAGULATION | Facility: CLINIC | Age: 58
End: 2018-06-11

## 2018-06-11 DIAGNOSIS — Z86.718 HISTORY OF DVT (DEEP VEIN THROMBOSIS): ICD-10-CM

## 2018-06-11 LAB — INR PPP: 1.5 (ref 0.9–1.1)

## 2018-06-19 ENCOUNTER — OFFICE VISIT - HEALTHEAST (OUTPATIENT)
Dept: NEUROSURGERY | Facility: CLINIC | Age: 58
End: 2018-06-19

## 2018-06-19 ENCOUNTER — RECORDS - HEALTHEAST (OUTPATIENT)
Dept: ADMINISTRATIVE | Facility: OTHER | Age: 58
End: 2018-06-19

## 2018-06-19 DIAGNOSIS — M79.2 RADICULAR PAIN IN LEFT ARM: ICD-10-CM

## 2018-06-19 DIAGNOSIS — M54.2 NECK PAIN: ICD-10-CM

## 2018-06-19 ASSESSMENT — MIFFLIN-ST. JEOR: SCORE: 1978.35

## 2018-06-20 ENCOUNTER — COMMUNICATION - HEALTHEAST (OUTPATIENT)
Dept: FAMILY MEDICINE | Facility: CLINIC | Age: 58
End: 2018-06-20

## 2018-06-20 DIAGNOSIS — M54.2 NECK PAIN: ICD-10-CM

## 2018-06-21 ENCOUNTER — COMMUNICATION - HEALTHEAST (OUTPATIENT)
Dept: FAMILY MEDICINE | Facility: CLINIC | Age: 58
End: 2018-06-21

## 2018-06-21 DIAGNOSIS — E11.9 DIABETES MELLITUS (H): ICD-10-CM

## 2018-06-22 ENCOUNTER — COMMUNICATION - HEALTHEAST (OUTPATIENT)
Dept: INTERNAL MEDICINE | Facility: CLINIC | Age: 58
End: 2018-06-22

## 2018-06-22 ENCOUNTER — COMMUNICATION - HEALTHEAST (OUTPATIENT)
Dept: ANTICOAGULATION | Facility: CLINIC | Age: 58
End: 2018-06-22

## 2018-06-22 DIAGNOSIS — Z86.718 HISTORY OF DVT (DEEP VEIN THROMBOSIS): ICD-10-CM

## 2018-06-22 LAB — INR PPP: 6.9 (ref 0.9–1.1)

## 2018-06-25 ENCOUNTER — COMMUNICATION - HEALTHEAST (OUTPATIENT)
Dept: ANTICOAGULATION | Facility: CLINIC | Age: 58
End: 2018-06-25

## 2018-06-25 DIAGNOSIS — Z86.718 HISTORY OF DVT (DEEP VEIN THROMBOSIS): ICD-10-CM

## 2018-06-25 LAB — INR PPP: 1.6 (ref 0.9–1.1)

## 2018-06-30 LAB — INR PPP: 2.9 (ref 0.9–1.1)

## 2018-07-02 ENCOUNTER — HOSPITAL ENCOUNTER (OUTPATIENT)
Dept: CT IMAGING | Facility: CLINIC | Age: 58
Discharge: HOME | End: 2018-07-02
Attending: NEUROLOGICAL SURGERY

## 2018-07-02 ENCOUNTER — COMMUNICATION - HEALTHEAST (OUTPATIENT)
Dept: FAMILY MEDICINE | Facility: CLINIC | Age: 58
End: 2018-07-02

## 2018-07-02 ENCOUNTER — HOSPITAL ENCOUNTER (OUTPATIENT)
Dept: RADIOLOGY | Facility: CLINIC | Age: 58
Discharge: HOME | End: 2018-07-02
Attending: NEUROLOGICAL SURGERY

## 2018-07-02 ENCOUNTER — COMMUNICATION - HEALTHEAST (OUTPATIENT)
Dept: ANTICOAGULATION | Facility: CLINIC | Age: 58
End: 2018-07-02

## 2018-07-02 DIAGNOSIS — M54.2 NECK PAIN: ICD-10-CM

## 2018-07-02 DIAGNOSIS — Z86.718 HISTORY OF DVT (DEEP VEIN THROMBOSIS): ICD-10-CM

## 2018-07-10 ENCOUNTER — AMBULATORY - HEALTHEAST (OUTPATIENT)
Dept: NEUROSURGERY | Facility: CLINIC | Age: 58
End: 2018-07-10

## 2018-07-10 DIAGNOSIS — M54.12 CERVICAL RADICULOPATHY: ICD-10-CM

## 2018-07-11 ENCOUNTER — AMBULATORY - HEALTHEAST (OUTPATIENT)
Dept: NEUROSURGERY | Facility: CLINIC | Age: 58
End: 2018-07-11

## 2018-07-13 ENCOUNTER — COMMUNICATION - HEALTHEAST (OUTPATIENT)
Dept: INTERNAL MEDICINE | Facility: CLINIC | Age: 58
End: 2018-07-13

## 2018-07-16 ENCOUNTER — AMBULATORY - HEALTHEAST (OUTPATIENT)
Dept: NEUROSURGERY | Facility: CLINIC | Age: 58
End: 2018-07-16

## 2018-07-16 ENCOUNTER — HOSPITAL ENCOUNTER (OUTPATIENT)
Dept: RADIOLOGY | Facility: CLINIC | Age: 58
Discharge: HOME | End: 2018-07-16
Attending: NEUROLOGICAL SURGERY

## 2018-07-16 ENCOUNTER — COMMUNICATION - HEALTHEAST (OUTPATIENT)
Dept: ANTICOAGULATION | Facility: CLINIC | Age: 58
End: 2018-07-16

## 2018-07-16 DIAGNOSIS — M54.2 NECK PAIN: ICD-10-CM

## 2018-07-16 DIAGNOSIS — Z86.718 HISTORY OF DVT (DEEP VEIN THROMBOSIS): ICD-10-CM

## 2018-07-16 LAB — INR PPP: 2.4 (ref 0.9–1.1)

## 2018-07-17 ENCOUNTER — AMBULATORY - HEALTHEAST (OUTPATIENT)
Dept: NEUROSURGERY | Facility: CLINIC | Age: 58
End: 2018-07-17

## 2018-07-17 ENCOUNTER — OFFICE VISIT - HEALTHEAST (OUTPATIENT)
Dept: NEUROSURGERY | Facility: CLINIC | Age: 58
End: 2018-07-17

## 2018-07-17 DIAGNOSIS — M48.062 SPINAL STENOSIS OF LUMBAR REGION WITH NEUROGENIC CLAUDICATION: ICD-10-CM

## 2018-07-18 ENCOUNTER — OFFICE VISIT - HEALTHEAST (OUTPATIENT)
Dept: NEUROSURGERY | Facility: CLINIC | Age: 58
End: 2018-07-18

## 2018-07-18 DIAGNOSIS — M25.832 ULNAR ABUTMENT SYNDROME, LEFT: ICD-10-CM

## 2018-07-18 DIAGNOSIS — M54.2 NECK PAIN: ICD-10-CM

## 2018-07-18 DIAGNOSIS — M25.512 SHOULDER PAIN, LEFT: ICD-10-CM

## 2018-07-18 ASSESSMENT — MIFFLIN-ST. JEOR: SCORE: 1974.27

## 2018-07-20 ENCOUNTER — COMMUNICATION - HEALTHEAST (OUTPATIENT)
Dept: FAMILY MEDICINE | Facility: CLINIC | Age: 58
End: 2018-07-20

## 2018-07-20 DIAGNOSIS — M54.2 NECK PAIN: ICD-10-CM

## 2018-07-23 ENCOUNTER — COMMUNICATION - HEALTHEAST (OUTPATIENT)
Dept: ANTICOAGULATION | Facility: CLINIC | Age: 58
End: 2018-07-23

## 2018-07-23 DIAGNOSIS — Z86.718 HISTORY OF DVT (DEEP VEIN THROMBOSIS): ICD-10-CM

## 2018-07-23 LAB — INR PPP: 1.9 (ref 0.9–1.1)

## 2018-07-24 ENCOUNTER — COMMUNICATION - HEALTHEAST (OUTPATIENT)
Dept: ADMINISTRATIVE | Facility: CLINIC | Age: 58
End: 2018-07-24

## 2018-07-27 ENCOUNTER — HOSPITAL ENCOUNTER (OUTPATIENT)
Dept: PHYSICAL MEDICINE AND REHAB | Facility: CLINIC | Age: 58
Discharge: HOME | End: 2018-07-27
Attending: NEUROLOGICAL SURGERY

## 2018-07-27 DIAGNOSIS — M25.512 LEFT SHOULDER PAIN: ICD-10-CM

## 2018-07-27 DIAGNOSIS — G56.22 ULNAR NEUROPATHY OF LEFT UPPER EXTREMITY: ICD-10-CM

## 2018-07-27 DIAGNOSIS — M47.812 FACET ARTHROPATHY, CERVICAL: ICD-10-CM

## 2018-07-27 DIAGNOSIS — M54.2 NECK PAIN: ICD-10-CM

## 2018-07-27 DIAGNOSIS — M79.18 MYOFASCIAL PAIN: ICD-10-CM

## 2018-07-31 ENCOUNTER — OFFICE VISIT - HEALTHEAST (OUTPATIENT)
Dept: FAMILY MEDICINE | Facility: CLINIC | Age: 58
End: 2018-07-31

## 2018-07-31 DIAGNOSIS — M54.2 NECK PAIN: ICD-10-CM

## 2018-07-31 DIAGNOSIS — E11.8 TYPE 2 DIABETES MELLITUS WITH COMPLICATION, WITHOUT LONG-TERM CURRENT USE OF INSULIN (H): ICD-10-CM

## 2018-07-31 DIAGNOSIS — T14.8XXA OPEN WOUND: ICD-10-CM

## 2018-08-01 ENCOUNTER — RECORDS - HEALTHEAST (OUTPATIENT)
Dept: VASCULAR ULTRASOUND | Facility: CLINIC | Age: 58
End: 2018-08-01

## 2018-08-01 ENCOUNTER — OFFICE VISIT - HEALTHEAST (OUTPATIENT)
Dept: VASCULAR SURGERY | Facility: CLINIC | Age: 58
End: 2018-08-01

## 2018-08-01 DIAGNOSIS — I73.9 PERIPHERAL VASCULAR DISEASE, UNSPECIFIED (H): ICD-10-CM

## 2018-08-01 DIAGNOSIS — I73.9 CLAUDICATION (H): ICD-10-CM

## 2018-08-06 ENCOUNTER — COMMUNICATION - HEALTHEAST (OUTPATIENT)
Dept: INTERNAL MEDICINE | Facility: CLINIC | Age: 58
End: 2018-08-06

## 2018-08-06 ENCOUNTER — HOSPITAL ENCOUNTER (OUTPATIENT)
Dept: PHYSICAL MEDICINE AND REHAB | Facility: CLINIC | Age: 58
Discharge: HOME | End: 2018-08-06
Attending: NEUROLOGICAL SURGERY

## 2018-08-06 DIAGNOSIS — Z79.01 WARFARIN ANTICOAGULATION: ICD-10-CM

## 2018-08-06 DIAGNOSIS — M54.2 NECK PAIN: ICD-10-CM

## 2018-08-06 DIAGNOSIS — Z86.718 HISTORY OF DVT (DEEP VEIN THROMBOSIS): ICD-10-CM

## 2018-08-06 DIAGNOSIS — E11.8 TYPE 2 DIABETES MELLITUS WITH COMPLICATION, WITHOUT LONG-TERM CURRENT USE OF INSULIN (H): ICD-10-CM

## 2018-08-06 LAB
GLUCOSE BLDC GLUCOMTR-MCNC: 192 MG/DL (ref 70–125)
POC INR - HE - HISTORICAL: 2.1 (ref 0.9–1.1)

## 2018-08-06 ASSESSMENT — MIFFLIN-ST. JEOR: SCORE: 1969.73

## 2018-08-07 ENCOUNTER — COMMUNICATION - HEALTHEAST (OUTPATIENT)
Dept: FAMILY MEDICINE | Facility: CLINIC | Age: 58
End: 2018-08-07

## 2018-08-07 DIAGNOSIS — M54.2 NECK PAIN: ICD-10-CM

## 2018-08-20 ENCOUNTER — COMMUNICATION - HEALTHEAST (OUTPATIENT)
Dept: ANTICOAGULATION | Facility: CLINIC | Age: 58
End: 2018-08-20

## 2018-08-21 ENCOUNTER — OFFICE VISIT - HEALTHEAST (OUTPATIENT)
Dept: NEUROSURGERY | Facility: CLINIC | Age: 58
End: 2018-08-21

## 2018-08-21 ENCOUNTER — COMMUNICATION - HEALTHEAST (OUTPATIENT)
Dept: ANTICOAGULATION | Facility: CLINIC | Age: 58
End: 2018-08-21

## 2018-08-21 DIAGNOSIS — Z86.718 HISTORY OF DVT (DEEP VEIN THROMBOSIS): ICD-10-CM

## 2018-08-21 DIAGNOSIS — M54.2 NECK PAIN: ICD-10-CM

## 2018-08-21 LAB — INR PPP: 1.5 (ref 0.9–1.1)

## 2018-08-22 ENCOUNTER — COMMUNICATION - HEALTHEAST (OUTPATIENT)
Dept: FAMILY MEDICINE | Facility: CLINIC | Age: 58
End: 2018-08-22

## 2018-08-22 DIAGNOSIS — I10 ESSENTIAL HYPERTENSION: ICD-10-CM

## 2018-08-22 DIAGNOSIS — M54.2 NECK PAIN: ICD-10-CM

## 2018-08-27 ENCOUNTER — RECORDS - HEALTHEAST (OUTPATIENT)
Dept: ADMINISTRATIVE | Facility: OTHER | Age: 58
End: 2018-08-27

## 2018-08-28 ENCOUNTER — COMMUNICATION - HEALTHEAST (OUTPATIENT)
Dept: FAMILY MEDICINE | Facility: CLINIC | Age: 58
End: 2018-08-28

## 2018-09-05 ENCOUNTER — COMMUNICATION - HEALTHEAST (OUTPATIENT)
Dept: ANTICOAGULATION | Facility: CLINIC | Age: 58
End: 2018-09-05

## 2018-09-06 ENCOUNTER — COMMUNICATION - HEALTHEAST (OUTPATIENT)
Dept: ANTICOAGULATION | Facility: CLINIC | Age: 58
End: 2018-09-06

## 2018-09-06 ENCOUNTER — OFFICE VISIT - HEALTHEAST (OUTPATIENT)
Dept: FAMILY MEDICINE | Facility: CLINIC | Age: 58
End: 2018-09-06

## 2018-09-06 DIAGNOSIS — F10.20 ETOHISM (H): ICD-10-CM

## 2018-09-06 DIAGNOSIS — J45.20 MILD INTERMITTENT ASTHMA WITHOUT COMPLICATION: ICD-10-CM

## 2018-09-06 DIAGNOSIS — F33.1 MODERATE EPISODE OF RECURRENT MAJOR DEPRESSIVE DISORDER (H): ICD-10-CM

## 2018-09-06 DIAGNOSIS — M25.519 CHRONIC SHOULDER PAIN: ICD-10-CM

## 2018-09-06 DIAGNOSIS — G89.4 CHRONIC PAIN SYNDROME: ICD-10-CM

## 2018-09-06 DIAGNOSIS — E66.01 MORBID OBESITY (H): ICD-10-CM

## 2018-09-06 DIAGNOSIS — E53.8 B12 DEFICIENCY: ICD-10-CM

## 2018-09-06 DIAGNOSIS — G89.29 CHRONIC SHOULDER PAIN: ICD-10-CM

## 2018-09-06 DIAGNOSIS — E11.8 TYPE 2 DIABETES MELLITUS WITH COMPLICATION, WITHOUT LONG-TERM CURRENT USE OF INSULIN (H): ICD-10-CM

## 2018-09-06 DIAGNOSIS — E78.2 MIXED HYPERLIPIDEMIA: ICD-10-CM

## 2018-09-06 DIAGNOSIS — Z01.818 PREOPERATIVE EXAMINATION: ICD-10-CM

## 2018-09-06 DIAGNOSIS — Z86.718 HISTORY OF DVT (DEEP VEIN THROMBOSIS): ICD-10-CM

## 2018-09-06 DIAGNOSIS — I10 ESSENTIAL HYPERTENSION: ICD-10-CM

## 2018-09-06 LAB
ALBUMIN SERPL-MCNC: 4 G/DL (ref 3.5–5)
ALP SERPL-CCNC: 62 U/L (ref 45–120)
ALT SERPL W P-5'-P-CCNC: 29 U/L (ref 0–45)
ANION GAP SERPL CALCULATED.3IONS-SCNC: 10 MMOL/L (ref 5–18)
AST SERPL W P-5'-P-CCNC: 42 U/L (ref 0–40)
ATRIAL RATE - MUSE: 96 BPM
BILIRUB SERPL-MCNC: 0.4 MG/DL (ref 0–1)
BUN SERPL-MCNC: 13 MG/DL (ref 8–22)
CALCIUM SERPL-MCNC: 10 MG/DL (ref 8.5–10.5)
CHLORIDE BLD-SCNC: 104 MMOL/L (ref 98–107)
CO2 SERPL-SCNC: 27 MMOL/L (ref 22–31)
CREAT SERPL-MCNC: 0.77 MG/DL (ref 0.7–1.3)
DIASTOLIC BLOOD PRESSURE - MUSE: NORMAL MMHG
ERYTHROCYTE [DISTWIDTH] IN BLOOD BY AUTOMATED COUNT: 14.5 % (ref 11–14.5)
GFR SERPL CREATININE-BSD FRML MDRD: >60 ML/MIN/1.73M2
GLUCOSE BLD-MCNC: 106 MG/DL (ref 70–125)
HBA1C MFR BLD: 6.4 % (ref 3.5–6.1)
HCT VFR BLD AUTO: 38.8 % (ref 40–54)
HGB BLD-MCNC: 12.5 G/DL (ref 14–18)
INR PPP: 1.8 (ref 0.9–1.1)
INTERPRETATION ECG - MUSE: NORMAL
MCH RBC QN AUTO: 27 PG (ref 27–34)
MCHC RBC AUTO-ENTMCNC: 32.2 G/DL (ref 32–36)
MCV RBC AUTO: 84 FL (ref 80–100)
P AXIS - MUSE: 72 DEGREES
PLATELET # BLD AUTO: 132 THOU/UL (ref 140–440)
PMV BLD AUTO: 7.7 FL (ref 7–10)
POTASSIUM BLD-SCNC: 4.5 MMOL/L (ref 3.5–5)
PR INTERVAL - MUSE: 164 MS
PROT SERPL-MCNC: 7.8 G/DL (ref 6–8)
QRS DURATION - MUSE: 86 MS
QT - MUSE: 340 MS
QTC - MUSE: 429 MS
R AXIS - MUSE: 32 DEGREES
RBC # BLD AUTO: 4.63 MILL/UL (ref 4.4–6.2)
SODIUM SERPL-SCNC: 141 MMOL/L (ref 136–145)
SYSTOLIC BLOOD PRESSURE - MUSE: NORMAL MMHG
T AXIS - MUSE: 43 DEGREES
VENTRICULAR RATE- MUSE: 96 BPM
VIT B12 SERPL-MCNC: 727 PG/ML (ref 213–816)
WBC: 5.6 THOU/UL (ref 4–11)

## 2018-09-06 ASSESSMENT — MIFFLIN-ST. JEOR: SCORE: 1966.89

## 2018-09-07 ENCOUNTER — COMMUNICATION - HEALTHEAST (OUTPATIENT)
Dept: FAMILY MEDICINE | Facility: CLINIC | Age: 58
End: 2018-09-07

## 2018-09-07 ENCOUNTER — OFFICE VISIT - HEALTHEAST (OUTPATIENT)
Dept: PHYSICAL THERAPY | Facility: REHABILITATION | Age: 58
End: 2018-09-07

## 2018-09-07 DIAGNOSIS — R29.898 DECREASED RANGE OF MOTION OF NECK: ICD-10-CM

## 2018-09-07 DIAGNOSIS — M54.2 CERVICAL PAIN (NECK): ICD-10-CM

## 2018-09-12 ENCOUNTER — RECORDS - HEALTHEAST (OUTPATIENT)
Dept: ADMINISTRATIVE | Facility: OTHER | Age: 58
End: 2018-09-12

## 2018-09-17 ENCOUNTER — COMMUNICATION - HEALTHEAST (OUTPATIENT)
Dept: FAMILY MEDICINE | Facility: CLINIC | Age: 58
End: 2018-09-17

## 2018-09-19 RX ORDER — ALBUTEROL SULFATE 90 UG/1
2 AEROSOL, METERED RESPIRATORY (INHALATION) EVERY 4 HOURS PRN
Qty: 1 INHALER | Refills: 3 | Status: SHIPPED | OUTPATIENT
Start: 2018-09-19

## 2018-09-21 ENCOUNTER — COMMUNICATION - HEALTHEAST (OUTPATIENT)
Dept: FAMILY MEDICINE | Facility: CLINIC | Age: 58
End: 2018-09-21

## 2018-09-21 DIAGNOSIS — G89.29 CHRONIC PAIN: ICD-10-CM

## 2018-09-24 ENCOUNTER — RECORDS - HEALTHEAST (OUTPATIENT)
Dept: ADMINISTRATIVE | Facility: OTHER | Age: 58
End: 2018-09-24

## 2018-09-24 ENCOUNTER — COMMUNICATION - HEALTHEAST (OUTPATIENT)
Dept: INTERNAL MEDICINE | Facility: CLINIC | Age: 58
End: 2018-09-24

## 2018-09-29 LAB — INR PPP: 2.7 (ref 0.9–1.1)

## 2018-10-01 ENCOUNTER — COMMUNICATION - HEALTHEAST (OUTPATIENT)
Dept: ANTICOAGULATION | Facility: CLINIC | Age: 58
End: 2018-10-01

## 2018-10-01 DIAGNOSIS — Z86.718 HISTORY OF DVT (DEEP VEIN THROMBOSIS): ICD-10-CM

## 2018-10-15 ENCOUNTER — COMMUNICATION - HEALTHEAST (OUTPATIENT)
Dept: ANTICOAGULATION | Facility: CLINIC | Age: 58
End: 2018-10-15

## 2018-10-15 ENCOUNTER — COMMUNICATION - HEALTHEAST (OUTPATIENT)
Dept: FAMILY MEDICINE | Facility: CLINIC | Age: 58
End: 2018-10-15

## 2018-10-15 DIAGNOSIS — G89.29 CHRONIC PAIN: ICD-10-CM

## 2018-10-16 ENCOUNTER — COMMUNICATION - HEALTHEAST (OUTPATIENT)
Dept: FAMILY MEDICINE | Facility: CLINIC | Age: 58
End: 2018-10-16

## 2018-10-16 DIAGNOSIS — E78.5 DYSLIPIDEMIA: ICD-10-CM

## 2018-10-17 ENCOUNTER — OFFICE VISIT - HEALTHEAST (OUTPATIENT)
Dept: FAMILY MEDICINE | Facility: CLINIC | Age: 58
End: 2018-10-17

## 2018-10-17 DIAGNOSIS — M25.50 POLYARTHRALGIA: ICD-10-CM

## 2018-10-25 ENCOUNTER — COMMUNICATION - HEALTHEAST (OUTPATIENT)
Dept: ANTICOAGULATION | Facility: CLINIC | Age: 58
End: 2018-10-25

## 2018-10-25 ENCOUNTER — RECORDS - HEALTHEAST (OUTPATIENT)
Dept: ADMINISTRATIVE | Facility: OTHER | Age: 58
End: 2018-10-25

## 2018-10-25 DIAGNOSIS — Z86.718 HISTORY OF DVT (DEEP VEIN THROMBOSIS): ICD-10-CM

## 2018-10-25 LAB — INR PPP: 2.3 (ref 0.9–1.1)

## 2018-10-26 ENCOUNTER — RECORDS - HEALTHEAST (OUTPATIENT)
Dept: ADMINISTRATIVE | Facility: OTHER | Age: 58
End: 2018-10-26

## 2018-11-05 ENCOUNTER — RECORDS - HEALTHEAST (OUTPATIENT)
Dept: ADMINISTRATIVE | Facility: OTHER | Age: 58
End: 2018-11-05

## 2018-11-13 ENCOUNTER — COMMUNICATION - HEALTHEAST (OUTPATIENT)
Dept: FAMILY MEDICINE | Facility: CLINIC | Age: 58
End: 2018-11-13

## 2018-11-13 DIAGNOSIS — E11.8 TYPE 2 DIABETES MELLITUS WITH COMPLICATION, WITHOUT LONG-TERM CURRENT USE OF INSULIN (H): ICD-10-CM

## 2018-11-15 ENCOUNTER — COMMUNICATION - HEALTHEAST (OUTPATIENT)
Dept: ANTICOAGULATION | Facility: CLINIC | Age: 58
End: 2018-11-15

## 2018-11-15 DIAGNOSIS — Z86.718 HISTORY OF DVT (DEEP VEIN THROMBOSIS): ICD-10-CM

## 2018-11-15 LAB — INR PPP: 1.7 (ref 0.9–1.1)

## 2018-11-19 ENCOUNTER — COMMUNICATION - HEALTHEAST (OUTPATIENT)
Dept: FAMILY MEDICINE | Facility: CLINIC | Age: 58
End: 2018-11-19

## 2018-11-19 DIAGNOSIS — E11.9 DIABETES MELLITUS (H): ICD-10-CM

## 2018-11-19 DIAGNOSIS — M25.50 POLYARTHRALGIA: ICD-10-CM

## 2018-11-19 DIAGNOSIS — E11.8 TYPE 2 DIABETES MELLITUS WITH COMPLICATION, WITHOUT LONG-TERM CURRENT USE OF INSULIN (H): ICD-10-CM

## 2018-12-06 ENCOUNTER — COMMUNICATION - HEALTHEAST (OUTPATIENT)
Dept: ANTICOAGULATION | Facility: CLINIC | Age: 58
End: 2018-12-06

## 2018-12-06 DIAGNOSIS — Z86.718 HISTORY OF DVT (DEEP VEIN THROMBOSIS): ICD-10-CM

## 2018-12-06 LAB — INR PPP: 3.2 (ref 0.9–1.1)

## 2018-12-14 ENCOUNTER — COMMUNICATION - HEALTHEAST (OUTPATIENT)
Dept: FAMILY MEDICINE | Facility: CLINIC | Age: 58
End: 2018-12-14

## 2018-12-14 DIAGNOSIS — E11.9 DIABETES MELLITUS (H): ICD-10-CM

## 2018-12-14 DIAGNOSIS — M25.50 POLYARTHRALGIA: ICD-10-CM

## 2018-12-21 ENCOUNTER — COMMUNICATION - HEALTHEAST (OUTPATIENT)
Dept: FAMILY MEDICINE | Facility: CLINIC | Age: 58
End: 2018-12-21

## 2018-12-21 DIAGNOSIS — E11.9 DIABETES MELLITUS (H): ICD-10-CM

## 2018-12-27 ENCOUNTER — COMMUNICATION - HEALTHEAST (OUTPATIENT)
Dept: ANTICOAGULATION | Facility: CLINIC | Age: 58
End: 2018-12-27

## 2019-01-03 ENCOUNTER — COMMUNICATION - HEALTHEAST (OUTPATIENT)
Dept: ANTICOAGULATION | Facility: CLINIC | Age: 59
End: 2019-01-03

## 2019-01-03 DIAGNOSIS — Z86.718 HISTORY OF DVT (DEEP VEIN THROMBOSIS): ICD-10-CM

## 2019-01-03 LAB — INR PPP: 2.9 (ref 0.9–1.1)

## 2019-01-07 ENCOUNTER — COMMUNICATION - HEALTHEAST (OUTPATIENT)
Dept: FAMILY MEDICINE | Facility: CLINIC | Age: 59
End: 2019-01-07

## 2019-01-07 DIAGNOSIS — Z86.718 HISTORY OF DVT (DEEP VEIN THROMBOSIS): ICD-10-CM

## 2019-01-10 ENCOUNTER — OFFICE VISIT - HEALTHEAST (OUTPATIENT)
Dept: NEUROSURGERY | Facility: CLINIC | Age: 59
End: 2019-01-10

## 2019-01-10 DIAGNOSIS — M47.812 CERVICAL SPONDYLOSIS WITHOUT MYELOPATHY: ICD-10-CM

## 2019-01-10 ASSESSMENT — MIFFLIN-ST. JEOR: SCORE: 1985.6

## 2019-01-11 ENCOUNTER — RECORDS - HEALTHEAST (OUTPATIENT)
Dept: ADMINISTRATIVE | Facility: OTHER | Age: 59
End: 2019-01-11

## 2019-01-17 ENCOUNTER — AMBULATORY - HEALTHEAST (OUTPATIENT)
Dept: PHYSICAL MEDICINE AND REHAB | Facility: CLINIC | Age: 59
End: 2019-01-17

## 2019-01-17 ENCOUNTER — HOSPITAL ENCOUNTER (OUTPATIENT)
Dept: PHYSICAL MEDICINE AND REHAB | Facility: CLINIC | Age: 59
Discharge: HOME | End: 2019-01-17
Attending: SURGERY

## 2019-01-17 DIAGNOSIS — E11.9 DIABETES MELLITUS, TYPE 2 (H): ICD-10-CM

## 2019-01-17 DIAGNOSIS — M47.812 CERVICAL SPONDYLOSIS WITHOUT MYELOPATHY: ICD-10-CM

## 2019-01-17 DIAGNOSIS — Z79.01 WARFARIN ANTICOAGULATION: ICD-10-CM

## 2019-01-17 LAB
GLUCOSE BLDC GLUCOMTR-MCNC: 201 MG/DL (ref 70–125)
POC INR - HE - HISTORICAL: 1.5 (ref 0.9–1.1)

## 2019-01-18 ENCOUNTER — COMMUNICATION - HEALTHEAST (OUTPATIENT)
Dept: FAMILY MEDICINE | Facility: CLINIC | Age: 59
End: 2019-01-18

## 2019-01-18 DIAGNOSIS — M25.50 POLYARTHRALGIA: ICD-10-CM

## 2019-01-24 ENCOUNTER — COMMUNICATION - HEALTHEAST (OUTPATIENT)
Dept: ANTICOAGULATION | Facility: CLINIC | Age: 59
End: 2019-01-24

## 2019-01-24 DIAGNOSIS — Z86.718 HISTORY OF DVT (DEEP VEIN THROMBOSIS): ICD-10-CM

## 2019-01-25 ENCOUNTER — RECORDS - HEALTHEAST (OUTPATIENT)
Dept: ADMINISTRATIVE | Facility: OTHER | Age: 59
End: 2019-01-25

## 2019-01-29 ENCOUNTER — OFFICE VISIT - HEALTHEAST (OUTPATIENT)
Dept: FAMILY MEDICINE | Facility: CLINIC | Age: 59
End: 2019-01-29

## 2019-01-29 DIAGNOSIS — J45.21 MILD INTERMITTENT ASTHMA WITH EXACERBATION: ICD-10-CM

## 2019-01-29 DIAGNOSIS — I10 ESSENTIAL HYPERTENSION: ICD-10-CM

## 2019-01-29 DIAGNOSIS — E53.8 B12 DEFICIENCY: ICD-10-CM

## 2019-01-29 DIAGNOSIS — F32.A ANXIETY AND DEPRESSION: ICD-10-CM

## 2019-01-29 DIAGNOSIS — Z01.818 PREOP GENERAL PHYSICAL EXAM: ICD-10-CM

## 2019-01-29 DIAGNOSIS — E78.5 DYSLIPIDEMIA: ICD-10-CM

## 2019-01-29 DIAGNOSIS — F10.20 ETOHISM (H): ICD-10-CM

## 2019-01-29 DIAGNOSIS — I82.90 DEEP VEIN THROMBOSIS (DVT) OF NON-EXTREMITY VEIN, UNSPECIFIED CHRONICITY: ICD-10-CM

## 2019-01-29 DIAGNOSIS — F41.9 ANXIETY AND DEPRESSION: ICD-10-CM

## 2019-01-29 DIAGNOSIS — F33.0 MILD EPISODE OF RECURRENT MAJOR DEPRESSIVE DISORDER (H): ICD-10-CM

## 2019-01-29 DIAGNOSIS — E11.9 DM (DIABETES MELLITUS) (H): ICD-10-CM

## 2019-01-29 DIAGNOSIS — M75.102 TEAR OF LEFT ROTATOR CUFF, UNSPECIFIED TEAR EXTENT: ICD-10-CM

## 2019-01-29 DIAGNOSIS — G89.4 CHRONIC PAIN SYNDROME: ICD-10-CM

## 2019-01-29 LAB
ALBUMIN SERPL-MCNC: 3.8 G/DL (ref 3.5–5)
ALP SERPL-CCNC: 59 U/L (ref 45–120)
ALT SERPL W P-5'-P-CCNC: 21 U/L (ref 0–45)
ANION GAP SERPL CALCULATED.3IONS-SCNC: 12 MMOL/L (ref 5–18)
AST SERPL W P-5'-P-CCNC: 24 U/L (ref 0–40)
BILIRUB SERPL-MCNC: 0.5 MG/DL (ref 0–1)
BUN SERPL-MCNC: 13 MG/DL (ref 8–22)
CALCIUM SERPL-MCNC: 9.8 MG/DL (ref 8.5–10.5)
CHLORIDE BLD-SCNC: 101 MMOL/L (ref 98–107)
CO2 SERPL-SCNC: 22 MMOL/L (ref 22–31)
CREAT SERPL-MCNC: 0.85 MG/DL (ref 0.7–1.3)
ERYTHROCYTE [DISTWIDTH] IN BLOOD BY AUTOMATED COUNT: 14.5 % (ref 11–14.5)
GFR SERPL CREATININE-BSD FRML MDRD: >60 ML/MIN/1.73M2
GLUCOSE BLD-MCNC: 275 MG/DL (ref 70–125)
HBA1C MFR BLD: 6.3 % (ref 3.5–6.1)
HCT VFR BLD AUTO: 40.7 % (ref 40–54)
HGB BLD-MCNC: 13.2 G/DL (ref 14–18)
MCH RBC QN AUTO: 28 PG (ref 27–34)
MCHC RBC AUTO-ENTMCNC: 32.4 G/DL (ref 32–36)
MCV RBC AUTO: 86 FL (ref 80–100)
PLATELET # BLD AUTO: 122 THOU/UL (ref 140–440)
PMV BLD AUTO: 8.5 FL (ref 7–10)
POTASSIUM BLD-SCNC: 4.8 MMOL/L (ref 3.5–5)
PROT SERPL-MCNC: 7.2 G/DL (ref 6–8)
RBC # BLD AUTO: 4.72 MILL/UL (ref 4.4–6.2)
SODIUM SERPL-SCNC: 135 MMOL/L (ref 136–145)
VIT B12 SERPL-MCNC: 883 PG/ML (ref 213–816)
WBC: 5.6 THOU/UL (ref 4–11)

## 2019-01-29 ASSESSMENT — MIFFLIN-ST. JEOR: SCORE: 2016.79

## 2019-02-07 ENCOUNTER — COMMUNICATION - HEALTHEAST (OUTPATIENT)
Dept: ANTICOAGULATION | Facility: CLINIC | Age: 59
End: 2019-02-07

## 2019-02-09 LAB — INR PPP: 2.3 (ref 0.9–1.1)

## 2019-02-11 ENCOUNTER — COMMUNICATION - HEALTHEAST (OUTPATIENT)
Dept: ANTICOAGULATION | Facility: CLINIC | Age: 59
End: 2019-02-11

## 2019-02-11 DIAGNOSIS — Z86.718 HISTORY OF DVT (DEEP VEIN THROMBOSIS): ICD-10-CM

## 2019-02-14 ENCOUNTER — RECORDS - HEALTHEAST (OUTPATIENT)
Dept: ADMINISTRATIVE | Facility: OTHER | Age: 59
End: 2019-02-14

## 2019-02-20 ENCOUNTER — COMMUNICATION - HEALTHEAST (OUTPATIENT)
Dept: FAMILY MEDICINE | Facility: CLINIC | Age: 59
End: 2019-02-20

## 2019-02-20 DIAGNOSIS — E11.8 TYPE 2 DIABETES MELLITUS WITH COMPLICATION, WITHOUT LONG-TERM CURRENT USE OF INSULIN (H): ICD-10-CM

## 2019-02-20 DIAGNOSIS — M25.50 POLYARTHRALGIA: ICD-10-CM

## 2019-02-20 DIAGNOSIS — E11.9 DIABETES MELLITUS (H): ICD-10-CM

## 2019-02-21 ENCOUNTER — COMMUNICATION - HEALTHEAST (OUTPATIENT)
Dept: ANTICOAGULATION | Facility: CLINIC | Age: 59
End: 2019-02-21

## 2019-02-21 DIAGNOSIS — Z86.718 HISTORY OF DVT (DEEP VEIN THROMBOSIS): ICD-10-CM

## 2019-02-22 ENCOUNTER — COMMUNICATION - HEALTHEAST (OUTPATIENT)
Dept: ANTICOAGULATION | Facility: CLINIC | Age: 59
End: 2019-02-22

## 2019-02-22 ENCOUNTER — RECORDS - HEALTHEAST (OUTPATIENT)
Dept: ADMINISTRATIVE | Facility: OTHER | Age: 59
End: 2019-02-22

## 2019-02-27 ENCOUNTER — COMMUNICATION - HEALTHEAST (OUTPATIENT)
Dept: ANTICOAGULATION | Facility: CLINIC | Age: 59
End: 2019-02-27

## 2019-02-27 DIAGNOSIS — Z86.718 HISTORY OF DVT (DEEP VEIN THROMBOSIS): ICD-10-CM

## 2019-02-27 LAB — INR PPP: 2.3 (ref 0.9–1.1)

## 2019-02-28 ENCOUNTER — HOSPITAL ENCOUNTER (OUTPATIENT)
Dept: PALLIATIVE MEDICINE | Facility: OTHER | Age: 59
Discharge: HOME | End: 2019-02-28
Attending: FAMILY MEDICINE

## 2019-02-28 DIAGNOSIS — G89.4 CHRONIC PAIN SYNDROME: ICD-10-CM

## 2019-02-28 RX ORDER — HYDROXYZINE HYDROCHLORIDE 25 MG/1
25 TABLET, FILM COATED ORAL EVERY 4 HOURS PRN
Status: SHIPPED | COMMUNITY
Start: 2019-02-28

## 2019-02-28 ASSESSMENT — MIFFLIN-ST. JEOR: SCORE: 2015.09

## 2019-03-02 LAB
6MAM UR QL: NOT DETECTED
7AMINOCLONAZEPAM UR QL: NOT DETECTED
A-OH ALPRAZ UR QL: NOT DETECTED
ALPRAZ UR QL: NOT DETECTED
AMPHET UR QL SCN: NOT DETECTED
BARBITURATES UR QL: NOT DETECTED
BUPRENORPHINE UR QL: NOT DETECTED
BZE UR QL: NOT DETECTED
CARBOXYTHC UR QL: PRESENT
CARISOPRODOL UR QL: NOT DETECTED
CLONAZEPAM UR QL: NOT DETECTED
CODEINE UR QL: NOT DETECTED
CREAT UR-MCNC: 106.4 MG/DL (ref 20–400)
DIAZEPAM UR QL: NOT DETECTED
ETHYL GLUCURONIDE UR QL: PRESENT
FENTANYL UR QL: NOT DETECTED
HYDROCODONE UR QL: PRESENT
HYDROMORPHONE UR QL: NOT DETECTED
LORAZEPAM UR QL: NOT DETECTED
MDA UR QL: NOT DETECTED
MDEA UR QL: NOT DETECTED
MDMA UR QL: NOT DETECTED
ME-PHENIDATE UR QL: NOT DETECTED
MEPERIDINE UR QL: NOT DETECTED
METHADONE UR QL: NOT DETECTED
METHAMPHET UR QL: NOT DETECTED
MIDAZOLAM UR QL SCN: NOT DETECTED
MORPHINE UR QL: NOT DETECTED
NORBUPRENORPHINE UR QL CFM: NOT DETECTED
NORDIAZEPAM UR QL: NOT DETECTED
NORFENTANYL UR QL: NOT DETECTED
NORHYDROCODONE UR QL CFM: PRESENT
NOROXYCODONE UR QL CFM: NOT DETECTED
NOROXYMORPHONE UR QL SCN: NOT DETECTED
OXAZEPAM UR QL: NOT DETECTED
OXYCODONE UR QL: NOT DETECTED
OXYMORPHONE UR QL: NOT DETECTED
PATHOLOGY STUDY: NORMAL
PCP UR QL: NOT DETECTED
PHENTERMINE UR QL: NOT DETECTED
PROPOXYPH UR QL: NOT DETECTED
SERVICE CMNT-IMP: NORMAL
TAPENTADOL UR QL SCN: NOT DETECTED
TAPENTADOL UR QL SCN: NOT DETECTED
TEMAZEPAM UR QL: NOT DETECTED
TRAMADOL UR QL: PRESENT
ZOLPIDEM UR QL: NOT DETECTED

## 2019-03-19 ENCOUNTER — COMMUNICATION - HEALTHEAST (OUTPATIENT)
Dept: FAMILY MEDICINE | Facility: CLINIC | Age: 59
End: 2019-03-19

## 2019-03-19 DIAGNOSIS — M25.50 POLYARTHRALGIA: ICD-10-CM

## 2019-03-20 ENCOUNTER — COMMUNICATION - HEALTHEAST (OUTPATIENT)
Dept: ANTICOAGULATION | Facility: CLINIC | Age: 59
End: 2019-03-20

## 2019-03-20 DIAGNOSIS — Z86.718 HISTORY OF DVT (DEEP VEIN THROMBOSIS): ICD-10-CM

## 2019-03-20 LAB — INR PPP: 1.5 (ref 0.9–1.1)

## 2019-03-22 ENCOUNTER — COMMUNICATION - HEALTHEAST (OUTPATIENT)
Dept: FAMILY MEDICINE | Facility: CLINIC | Age: 59
End: 2019-03-22

## 2019-03-22 ENCOUNTER — COMMUNICATION - HEALTHEAST (OUTPATIENT)
Dept: PALLIATIVE MEDICINE | Facility: OTHER | Age: 59
End: 2019-03-22

## 2019-03-22 DIAGNOSIS — M25.50 POLYARTHRALGIA: ICD-10-CM

## 2019-04-01 ENCOUNTER — RECORDS - HEALTHEAST (OUTPATIENT)
Dept: ADMINISTRATIVE | Facility: OTHER | Age: 59
End: 2019-04-01

## 2019-04-02 ENCOUNTER — HOSPITAL ENCOUNTER (OUTPATIENT)
Dept: PALLIATIVE MEDICINE | Facility: OTHER | Age: 59
Discharge: HOME | End: 2019-04-02
Attending: NURSE PRACTITIONER

## 2019-04-02 DIAGNOSIS — F33.1 MODERATE EPISODE OF RECURRENT MAJOR DEPRESSIVE DISORDER (H): ICD-10-CM

## 2019-04-02 DIAGNOSIS — F41.9 ANXIETY: ICD-10-CM

## 2019-04-02 DIAGNOSIS — E11.8 TYPE 2 DIABETES MELLITUS WITH COMPLICATION, WITHOUT LONG-TERM CURRENT USE OF INSULIN (H): ICD-10-CM

## 2019-04-02 DIAGNOSIS — G89.4 CHRONIC PAIN SYNDROME: ICD-10-CM

## 2019-04-02 DIAGNOSIS — I10 ESSENTIAL HYPERTENSION: ICD-10-CM

## 2019-04-02 DIAGNOSIS — E53.8 B12 DEFICIENCY: ICD-10-CM

## 2019-04-02 DIAGNOSIS — M25.50 POLYARTHRALGIA: ICD-10-CM

## 2019-04-02 DIAGNOSIS — M54.2 NECK PAIN: ICD-10-CM

## 2019-04-02 DIAGNOSIS — E78.2 MIXED HYPERLIPIDEMIA: ICD-10-CM

## 2019-04-02 ASSESSMENT — MIFFLIN-ST. JEOR: SCORE: 1969.73

## 2019-04-03 ENCOUNTER — OFFICE VISIT - HEALTHEAST (OUTPATIENT)
Dept: FAMILY MEDICINE | Facility: CLINIC | Age: 59
End: 2019-04-03

## 2019-04-03 DIAGNOSIS — I10 ESSENTIAL HYPERTENSION: ICD-10-CM

## 2019-04-03 DIAGNOSIS — T30.0 BURN, THIRD DEGREE: ICD-10-CM

## 2019-04-03 DIAGNOSIS — Z12.11 COLON CANCER SCREENING: ICD-10-CM

## 2019-04-03 DIAGNOSIS — R59.9 LYMPH NODE ENLARGEMENT: ICD-10-CM

## 2019-04-04 ENCOUNTER — COMMUNICATION - HEALTHEAST (OUTPATIENT)
Dept: ONCOLOGY | Facility: CLINIC | Age: 59
End: 2019-04-04

## 2019-04-09 ENCOUNTER — HOSPITAL ENCOUNTER (OUTPATIENT)
Dept: PALLIATIVE MEDICINE | Facility: OTHER | Age: 59
Discharge: HOME | End: 2019-04-09
Attending: SOCIAL WORKER

## 2019-04-09 DIAGNOSIS — F33.1 MODERATE EPISODE OF RECURRENT MAJOR DEPRESSIVE DISORDER (H): ICD-10-CM

## 2019-04-09 DIAGNOSIS — G89.4 CHRONIC PAIN SYNDROME: ICD-10-CM

## 2019-04-09 DIAGNOSIS — F10.10 ALCOHOL ABUSE: ICD-10-CM

## 2019-04-09 DIAGNOSIS — F12.10 CANNABIS ABUSE: ICD-10-CM

## 2019-04-10 ENCOUNTER — COMMUNICATION - HEALTHEAST (OUTPATIENT)
Dept: ANTICOAGULATION | Facility: CLINIC | Age: 59
End: 2019-04-10

## 2019-04-15 ENCOUNTER — OFFICE VISIT - HEALTHEAST (OUTPATIENT)
Dept: ONCOLOGY | Facility: CLINIC | Age: 59
End: 2019-04-15

## 2019-04-15 DIAGNOSIS — R59.1 LYMPHADENOPATHY: ICD-10-CM

## 2019-04-15 LAB
ALBUMIN SERPL-MCNC: 4 G/DL (ref 3.5–5)
ALP SERPL-CCNC: 61 U/L (ref 45–120)
ALT SERPL W P-5'-P-CCNC: 15 U/L (ref 0–45)
ANION GAP SERPL CALCULATED.3IONS-SCNC: 8 MMOL/L (ref 5–18)
AST SERPL W P-5'-P-CCNC: 22 U/L (ref 0–40)
BASOPHILS # BLD AUTO: 0.1 THOU/UL (ref 0–0.2)
BASOPHILS NFR BLD AUTO: 1 % (ref 0–2)
BILIRUB SERPL-MCNC: 0.4 MG/DL (ref 0–1)
BUN SERPL-MCNC: 13 MG/DL (ref 8–22)
CALCIUM SERPL-MCNC: 9.8 MG/DL (ref 8.5–10.5)
CHLORIDE BLD-SCNC: 102 MMOL/L (ref 98–107)
CO2 SERPL-SCNC: 26 MMOL/L (ref 22–31)
CREAT SERPL-MCNC: 0.78 MG/DL (ref 0.7–1.3)
EOSINOPHIL # BLD AUTO: 0.1 THOU/UL (ref 0–0.4)
EOSINOPHIL NFR BLD AUTO: 1 % (ref 0–6)
ERYTHROCYTE [DISTWIDTH] IN BLOOD BY AUTOMATED COUNT: 16.1 % (ref 11–14.5)
GFR SERPL CREATININE-BSD FRML MDRD: >60 ML/MIN/1.73M2
GLUCOSE BLD-MCNC: 167 MG/DL (ref 70–125)
HCT VFR BLD AUTO: 38.6 % (ref 40–54)
HGB BLD-MCNC: 12.2 G/DL (ref 14–18)
LDH SERPL L TO P-CCNC: 214 U/L (ref 125–220)
LYMPHOCYTES # BLD AUTO: 1.3 THOU/UL (ref 0.8–4.4)
LYMPHOCYTES NFR BLD AUTO: 21 % (ref 20–40)
MCH RBC QN AUTO: 27.9 PG (ref 27–34)
MCHC RBC AUTO-ENTMCNC: 31.6 G/DL (ref 32–36)
MCV RBC AUTO: 88 FL (ref 80–100)
MONOCYTES # BLD AUTO: 0.6 THOU/UL (ref 0–0.9)
MONOCYTES NFR BLD AUTO: 10 % (ref 2–10)
NEUTROPHILS # BLD AUTO: 4 THOU/UL (ref 2–7.7)
NEUTROPHILS NFR BLD AUTO: 66 % (ref 50–70)
PLATELET # BLD AUTO: 121 THOU/UL (ref 140–440)
PMV BLD AUTO: 9.8 FL (ref 8.5–12.5)
POTASSIUM BLD-SCNC: 4.3 MMOL/L (ref 3.5–5)
PROT SERPL-MCNC: 7.7 G/DL (ref 6–8)
RBC # BLD AUTO: 4.38 MILL/UL (ref 4.4–6.2)
SODIUM SERPL-SCNC: 136 MMOL/L (ref 136–145)
WBC: 6.1 THOU/UL (ref 4–11)

## 2019-04-15 ASSESSMENT — MIFFLIN-ST. JEOR: SCORE: 2049.78

## 2019-04-16 ENCOUNTER — RECORDS - HEALTHEAST (OUTPATIENT)
Dept: ADMINISTRATIVE | Facility: OTHER | Age: 59
End: 2019-04-16

## 2019-04-16 LAB
COLOGUARD-ABSTRACT: NEGATIVE
HIV 1+2 AB+HIV1 P24 AG SERPL QL IA: NEGATIVE

## 2019-04-17 ENCOUNTER — OFFICE VISIT - HEALTHEAST (OUTPATIENT)
Dept: FAMILY MEDICINE | Facility: CLINIC | Age: 59
End: 2019-04-17

## 2019-04-17 DIAGNOSIS — L03.115 CELLULITIS OF RIGHT LOWER EXTREMITY: ICD-10-CM

## 2019-04-17 DIAGNOSIS — E66.01 MORBID OBESITY (H): ICD-10-CM

## 2019-04-17 DIAGNOSIS — K70.30 ALCOHOLIC CIRRHOSIS OF LIVER WITHOUT ASCITES (H): ICD-10-CM

## 2019-04-17 DIAGNOSIS — I73.9 CLAUDICATION (H): ICD-10-CM

## 2019-04-19 ENCOUNTER — HOSPITAL ENCOUNTER (OUTPATIENT)
Dept: CT IMAGING | Facility: CLINIC | Age: 59
Setting detail: RADIATION/ONCOLOGY SERIES
Discharge: STILL ADMITTED OR ADMINISTRATIVE | End: 2019-04-19
Attending: INTERNAL MEDICINE

## 2019-04-19 DIAGNOSIS — R59.1 LYMPHADENOPATHY: ICD-10-CM

## 2019-04-22 ENCOUNTER — COMMUNICATION - HEALTHEAST (OUTPATIENT)
Dept: FAMILY MEDICINE | Facility: CLINIC | Age: 59
End: 2019-04-22

## 2019-04-22 ENCOUNTER — COMMUNICATION - HEALTHEAST (OUTPATIENT)
Dept: ONCOLOGY | Facility: CLINIC | Age: 59
End: 2019-04-22

## 2019-04-22 ENCOUNTER — COMMUNICATION - HEALTHEAST (OUTPATIENT)
Dept: ANTICOAGULATION | Facility: CLINIC | Age: 59
End: 2019-04-22

## 2019-04-22 DIAGNOSIS — K70.30 ALCOHOLIC CIRRHOSIS OF LIVER WITHOUT ASCITES (H): ICD-10-CM

## 2019-04-22 DIAGNOSIS — M25.50 POLYARTHRALGIA: ICD-10-CM

## 2019-04-22 DIAGNOSIS — Z86.718 HISTORY OF DVT (DEEP VEIN THROMBOSIS): ICD-10-CM

## 2019-04-22 LAB — INR PPP: 3 (ref 0.9–1.1)

## 2019-04-24 ENCOUNTER — COMMUNICATION - HEALTHEAST (OUTPATIENT)
Dept: FAMILY MEDICINE | Facility: CLINIC | Age: 59
End: 2019-04-24

## 2019-04-24 DIAGNOSIS — M25.50 POLYARTHRALGIA: ICD-10-CM

## 2019-04-27 ENCOUNTER — COMMUNICATION - HEALTHEAST (OUTPATIENT)
Dept: PALLIATIVE MEDICINE | Facility: OTHER | Age: 59
End: 2019-04-27

## 2019-04-27 DIAGNOSIS — M54.2 NECK PAIN: ICD-10-CM

## 2019-04-27 DIAGNOSIS — F41.9 ANXIETY: ICD-10-CM

## 2019-04-27 DIAGNOSIS — G89.4 CHRONIC PAIN SYNDROME: ICD-10-CM

## 2019-04-27 DIAGNOSIS — M25.50 POLYARTHRALGIA: ICD-10-CM

## 2019-05-01 ENCOUNTER — RECORDS - HEALTHEAST (OUTPATIENT)
Dept: ADMINISTRATIVE | Facility: OTHER | Age: 59
End: 2019-05-01

## 2019-05-07 ENCOUNTER — COMMUNICATION - HEALTHEAST (OUTPATIENT)
Dept: ANTICOAGULATION | Facility: CLINIC | Age: 59
End: 2019-05-07

## 2019-05-07 DIAGNOSIS — Z86.718 HISTORY OF DVT (DEEP VEIN THROMBOSIS): ICD-10-CM

## 2019-05-07 LAB — INR PPP: 2.6 (ref 0.9–1.1)

## 2019-05-10 ENCOUNTER — HOSPITAL ENCOUNTER (OUTPATIENT)
Dept: PALLIATIVE MEDICINE | Facility: OTHER | Age: 59
Discharge: HOME | End: 2019-05-10
Attending: NURSE PRACTITIONER

## 2019-05-10 DIAGNOSIS — M25.50 POLYARTHRALGIA: ICD-10-CM

## 2019-05-10 DIAGNOSIS — M54.2 NECK PAIN: ICD-10-CM

## 2019-05-10 DIAGNOSIS — F41.9 ANXIETY: ICD-10-CM

## 2019-05-10 DIAGNOSIS — F10.20 ETOHISM (H): ICD-10-CM

## 2019-05-10 DIAGNOSIS — G89.4 CHRONIC PAIN SYNDROME: ICD-10-CM

## 2019-05-10 LAB — MAGNESIUM SERPL-MCNC: 1.9 MG/DL (ref 1.8–2.6)

## 2019-05-10 ASSESSMENT — MIFFLIN-ST. JEOR: SCORE: 2015.09

## 2019-05-13 ENCOUNTER — COMMUNICATION - HEALTHEAST (OUTPATIENT)
Dept: PALLIATIVE MEDICINE | Facility: OTHER | Age: 59
End: 2019-05-13

## 2019-05-13 LAB — 25(OH)D3 SERPL-MCNC: 17.7 NG/ML (ref 30–80)

## 2019-05-14 ENCOUNTER — COMMUNICATION - HEALTHEAST (OUTPATIENT)
Dept: FAMILY MEDICINE | Facility: CLINIC | Age: 59
End: 2019-05-14

## 2019-05-14 ENCOUNTER — COMMUNICATION - HEALTHEAST (OUTPATIENT)
Dept: PALLIATIVE MEDICINE | Facility: OTHER | Age: 59
End: 2019-05-14

## 2019-05-14 DIAGNOSIS — E56.9 VITAMIN DEFICIENCY: ICD-10-CM

## 2019-05-14 DIAGNOSIS — E11.8 TYPE 2 DIABETES MELLITUS WITH COMPLICATION, WITHOUT LONG-TERM CURRENT USE OF INSULIN (H): ICD-10-CM

## 2019-05-15 ENCOUNTER — RECORDS - HEALTHEAST (OUTPATIENT)
Dept: HEALTH INFORMATION MANAGEMENT | Facility: CLINIC | Age: 59
End: 2019-05-15

## 2019-05-17 ENCOUNTER — COMMUNICATION - HEALTHEAST (OUTPATIENT)
Dept: FAMILY MEDICINE | Facility: CLINIC | Age: 59
End: 2019-05-17

## 2019-05-17 DIAGNOSIS — M25.50 POLYARTHRALGIA: ICD-10-CM

## 2019-05-28 ENCOUNTER — COMMUNICATION - HEALTHEAST (OUTPATIENT)
Dept: ANTICOAGULATION | Facility: CLINIC | Age: 59
End: 2019-05-28

## 2019-06-03 ENCOUNTER — RECORDS - HEALTHEAST (OUTPATIENT)
Dept: ADMINISTRATIVE | Facility: OTHER | Age: 59
End: 2019-06-03

## 2019-06-03 ENCOUNTER — COMMUNICATION - HEALTHEAST (OUTPATIENT)
Dept: FAMILY MEDICINE | Facility: CLINIC | Age: 59
End: 2019-06-03

## 2019-06-03 DIAGNOSIS — E11.9 DIABETES MELLITUS (H): ICD-10-CM

## 2019-06-03 DIAGNOSIS — E11.8 TYPE 2 DIABETES MELLITUS WITH COMPLICATION, WITHOUT LONG-TERM CURRENT USE OF INSULIN (H): ICD-10-CM

## 2019-06-05 ENCOUNTER — RECORDS - HEALTHEAST (OUTPATIENT)
Dept: GENERAL RADIOLOGY | Facility: CLINIC | Age: 59
End: 2019-06-05

## 2019-06-05 ENCOUNTER — OFFICE VISIT - HEALTHEAST (OUTPATIENT)
Dept: FAMILY MEDICINE | Facility: CLINIC | Age: 59
End: 2019-06-05

## 2019-06-05 ENCOUNTER — HOSPITAL ENCOUNTER (OUTPATIENT)
Dept: ULTRASOUND IMAGING | Facility: CLINIC | Age: 59
Discharge: HOME | End: 2019-06-05
Attending: FAMILY MEDICINE

## 2019-06-05 DIAGNOSIS — M79.661 RIGHT CALF PAIN: ICD-10-CM

## 2019-06-05 DIAGNOSIS — M25.571 PAIN IN RIGHT ANKLE AND JOINTS OF RIGHT FOOT: ICD-10-CM

## 2019-06-05 DIAGNOSIS — M25.571 PAIN IN JOINT INVOLVING RIGHT ANKLE AND FOOT: ICD-10-CM

## 2019-06-10 ENCOUNTER — COMMUNICATION - HEALTHEAST (OUTPATIENT)
Dept: FAMILY MEDICINE | Facility: CLINIC | Age: 59
End: 2019-06-10

## 2019-06-11 ENCOUNTER — COMMUNICATION - HEALTHEAST (OUTPATIENT)
Dept: PALLIATIVE MEDICINE | Facility: OTHER | Age: 59
End: 2019-06-11

## 2019-06-11 DIAGNOSIS — M25.50 POLYARTHRALGIA: ICD-10-CM

## 2019-06-11 DIAGNOSIS — G89.4 CHRONIC PAIN SYNDROME: ICD-10-CM

## 2019-06-11 DIAGNOSIS — F41.9 ANXIETY: ICD-10-CM

## 2019-06-11 DIAGNOSIS — M54.2 NECK PAIN: ICD-10-CM

## 2019-06-18 ENCOUNTER — OFFICE VISIT - HEALTHEAST (OUTPATIENT)
Dept: NEUROSURGERY | Facility: CLINIC | Age: 59
End: 2019-06-18

## 2019-06-18 DIAGNOSIS — G56.03 BILATERAL CARPAL TUNNEL SYNDROME: ICD-10-CM

## 2019-06-18 DIAGNOSIS — M54.2 NECK PAIN: ICD-10-CM

## 2019-06-18 DIAGNOSIS — M47.812 SPONDYLOSIS OF CERVICAL REGION WITHOUT MYELOPATHY OR RADICULOPATHY: ICD-10-CM

## 2019-06-20 ENCOUNTER — AMBULATORY - HEALTHEAST (OUTPATIENT)
Dept: BEHAVIORAL HEALTH | Facility: CLINIC | Age: 59
End: 2019-06-20

## 2019-06-20 ENCOUNTER — HOSPITAL ENCOUNTER (OUTPATIENT)
Dept: PALLIATIVE MEDICINE | Facility: OTHER | Age: 59
Discharge: HOME | End: 2019-06-20
Attending: NURSE PRACTITIONER

## 2019-06-20 ENCOUNTER — RECORDS - HEALTHEAST (OUTPATIENT)
Dept: ADMINISTRATIVE | Facility: OTHER | Age: 59
End: 2019-06-20

## 2019-06-20 DIAGNOSIS — E56.9 VITAMIN DEFICIENCY: ICD-10-CM

## 2019-06-20 DIAGNOSIS — R60.0 LOCALIZED EDEMA: ICD-10-CM

## 2019-06-20 DIAGNOSIS — E11.9 DIABETES MELLITUS (H): ICD-10-CM

## 2019-06-20 DIAGNOSIS — M25.50 POLYARTHRALGIA: ICD-10-CM

## 2019-06-20 DIAGNOSIS — F41.9 ANXIETY: ICD-10-CM

## 2019-06-20 DIAGNOSIS — M54.2 NECK PAIN: ICD-10-CM

## 2019-06-20 DIAGNOSIS — F19.10 CHEMICAL ABUSE (H): ICD-10-CM

## 2019-06-20 DIAGNOSIS — G89.4 CHRONIC PAIN SYNDROME: ICD-10-CM

## 2019-06-20 RX ORDER — ERGOCALCIFEROL 1.25 MG/1
50000 CAPSULE ORAL
Qty: 4 CAPSULE | Refills: 2 | Status: SHIPPED | OUTPATIENT
Start: 2019-08-10 | End: 2019-09-07

## 2019-06-20 ASSESSMENT — MIFFLIN-ST. JEOR: SCORE: 2015.09

## 2019-06-25 ENCOUNTER — OFFICE VISIT - HEALTHEAST (OUTPATIENT)
Dept: ADDICTION MEDICINE | Facility: CLINIC | Age: 59
End: 2019-06-25

## 2019-06-25 DIAGNOSIS — F10.20 ALCOHOL USE DISORDER, SEVERE, DEPENDENCE (H): ICD-10-CM

## 2019-06-25 DIAGNOSIS — F12.20 CANNABIS USE DISORDER, SEVERE, DEPENDENCE (H): ICD-10-CM

## 2019-06-26 ENCOUNTER — COMMUNICATION - HEALTHEAST (OUTPATIENT)
Dept: FAMILY MEDICINE | Facility: CLINIC | Age: 59
End: 2019-06-26

## 2019-06-26 DIAGNOSIS — M25.50 POLYARTHRALGIA: ICD-10-CM

## 2019-06-27 ENCOUNTER — COMMUNICATION - HEALTHEAST (OUTPATIENT)
Dept: NEUROSURGERY | Facility: CLINIC | Age: 59
End: 2019-06-27

## 2019-06-27 ENCOUNTER — HOSPITAL ENCOUNTER (OUTPATIENT)
Dept: PHYSICAL MEDICINE AND REHAB | Facility: CLINIC | Age: 59
Discharge: HOME | End: 2019-06-27
Attending: SURGERY

## 2019-06-27 DIAGNOSIS — G56.03 BILATERAL CARPAL TUNNEL SYNDROME: ICD-10-CM

## 2019-06-28 ENCOUNTER — COMMUNICATION - HEALTHEAST (OUTPATIENT)
Dept: FAMILY MEDICINE | Facility: CLINIC | Age: 59
End: 2019-06-28

## 2019-06-28 DIAGNOSIS — M25.50 POLYARTHRALGIA: ICD-10-CM

## 2019-07-01 ENCOUNTER — OFFICE VISIT - HEALTHEAST (OUTPATIENT)
Dept: FAMILY MEDICINE | Facility: CLINIC | Age: 59
End: 2019-07-01

## 2019-07-01 DIAGNOSIS — F33.1 MODERATE EPISODE OF RECURRENT MAJOR DEPRESSIVE DISORDER (H): ICD-10-CM

## 2019-07-01 DIAGNOSIS — E11.10 TYPE 2 DIABETES MELLITUS WITH KETOACIDOSIS WITHOUT COMA, WITHOUT LONG-TERM CURRENT USE OF INSULIN (H): ICD-10-CM

## 2019-07-01 DIAGNOSIS — F10.20 ETOHISM (H): ICD-10-CM

## 2019-07-01 DIAGNOSIS — Z86.718 HISTORY OF DVT (DEEP VEIN THROMBOSIS): ICD-10-CM

## 2019-07-01 DIAGNOSIS — G89.4 CHRONIC PAIN SYNDROME: ICD-10-CM

## 2019-07-01 LAB
CREAT UR-MCNC: 84.1 MG/DL
HBA1C MFR BLD: 5.9 % (ref 3.5–6)
MICROALBUMIN UR-MCNC: 9.41 MG/DL (ref 0–1.99)
MICROALBUMIN/CREAT UR: 111.9 MG/G

## 2019-07-02 ENCOUNTER — RECORDS - HEALTHEAST (OUTPATIENT)
Dept: ADMINISTRATIVE | Facility: OTHER | Age: 59
End: 2019-07-02

## 2019-07-02 ENCOUNTER — COMMUNICATION - HEALTHEAST (OUTPATIENT)
Dept: PALLIATIVE MEDICINE | Facility: OTHER | Age: 59
End: 2019-07-02

## 2019-07-02 ENCOUNTER — COMMUNICATION - HEALTHEAST (OUTPATIENT)
Dept: FAMILY MEDICINE | Facility: CLINIC | Age: 59
End: 2019-07-02

## 2019-07-02 DIAGNOSIS — G89.4 CHRONIC PAIN SYNDROME: ICD-10-CM

## 2019-07-03 RX ORDER — TRAMADOL HYDROCHLORIDE 50 MG/1
50 TABLET ORAL 2 TIMES DAILY
Qty: 60 TABLET | Refills: 0 | Status: SHIPPED | OUTPATIENT
Start: 2019-07-03

## 2019-07-10 ENCOUNTER — RECORDS - HEALTHEAST (OUTPATIENT)
Dept: RADIOLOGY | Facility: CLINIC | Age: 59
End: 2019-07-10

## 2019-07-15 ENCOUNTER — RECORDS - HEALTHEAST (OUTPATIENT)
Dept: ANTICOAGULATION | Facility: CLINIC | Age: 59
End: 2019-07-15

## 2019-07-16 ENCOUNTER — OFFICE VISIT - HEALTHEAST (OUTPATIENT)
Dept: NEUROSURGERY | Facility: CLINIC | Age: 59
End: 2019-07-16

## 2019-07-17 ENCOUNTER — OFFICE VISIT - HEALTHEAST (OUTPATIENT)
Dept: NEUROSURGERY | Facility: CLINIC | Age: 59
End: 2019-07-17

## 2019-07-17 ENCOUNTER — OFFICE VISIT - HEALTHEAST (OUTPATIENT)
Dept: PHYSICAL THERAPY | Facility: REHABILITATION | Age: 59
End: 2019-07-17

## 2019-07-17 ENCOUNTER — HOSPITAL ENCOUNTER (OUTPATIENT)
Dept: RADIOLOGY | Facility: HOSPITAL | Age: 59
Discharge: HOME | End: 2019-07-17
Attending: SURGERY

## 2019-07-17 DIAGNOSIS — R60.0 LOCALIZED EDEMA: ICD-10-CM

## 2019-07-17 DIAGNOSIS — M47.812 SPONDYLOSIS OF CERVICAL REGION WITHOUT MYELOPATHY OR RADICULOPATHY: ICD-10-CM

## 2019-07-17 DIAGNOSIS — I89.0 LYMPHEDEMA: ICD-10-CM

## 2019-07-17 ASSESSMENT — MIFFLIN-ST. JEOR: SCORE: 2001.48

## 2019-07-18 ENCOUNTER — AMBULATORY - HEALTHEAST (OUTPATIENT)
Dept: PHYSICAL MEDICINE AND REHAB | Facility: CLINIC | Age: 59
End: 2019-07-18

## 2019-07-18 ENCOUNTER — COMMUNICATION - HEALTHEAST (OUTPATIENT)
Dept: ANTICOAGULATION | Facility: CLINIC | Age: 59
End: 2019-07-18

## 2019-07-18 ENCOUNTER — COMMUNICATION - HEALTHEAST (OUTPATIENT)
Dept: PALLIATIVE MEDICINE | Facility: OTHER | Age: 59
End: 2019-07-18

## 2019-07-18 ENCOUNTER — HOSPITAL ENCOUNTER (OUTPATIENT)
Dept: PHYSICAL MEDICINE AND REHAB | Facility: CLINIC | Age: 59
Discharge: HOME | End: 2019-07-18
Attending: SURGERY

## 2019-07-18 DIAGNOSIS — Z86.718 HISTORY OF DVT (DEEP VEIN THROMBOSIS): ICD-10-CM

## 2019-07-18 DIAGNOSIS — M54.2 NECK PAIN: ICD-10-CM

## 2019-07-18 DIAGNOSIS — I82.409 DVT (DEEP VENOUS THROMBOSIS) (H): ICD-10-CM

## 2019-07-18 DIAGNOSIS — E11.9 DIABETES MELLITUS, TYPE 2 (H): ICD-10-CM

## 2019-07-18 DIAGNOSIS — M47.812 SPONDYLOSIS OF CERVICAL REGION WITHOUT MYELOPATHY OR RADICULOPATHY: ICD-10-CM

## 2019-07-18 LAB
GLUCOSE BLDC GLUCOMTR-MCNC: 98 MG/DL (ref 70–125)
POC INR - HE - HISTORICAL: 1.9 (ref 0.9–1.1)

## 2019-07-19 ENCOUNTER — OFFICE VISIT - HEALTHEAST (OUTPATIENT)
Dept: PHYSICAL THERAPY | Facility: REHABILITATION | Age: 59
End: 2019-07-19

## 2019-07-19 DIAGNOSIS — R60.0 LOCALIZED EDEMA: ICD-10-CM

## 2019-07-19 DIAGNOSIS — R29.898 DECREASED RANGE OF MOTION OF NECK: ICD-10-CM

## 2019-07-19 DIAGNOSIS — I89.0 LYMPHEDEMA: ICD-10-CM

## 2019-07-19 DIAGNOSIS — M54.2 CERVICAL PAIN (NECK): ICD-10-CM

## 2019-07-23 ENCOUNTER — COMMUNICATION - HEALTHEAST (OUTPATIENT)
Dept: SCHEDULING | Facility: CLINIC | Age: 59
End: 2019-07-23

## 2019-07-23 ENCOUNTER — OFFICE VISIT - HEALTHEAST (OUTPATIENT)
Dept: INTERNAL MEDICINE | Facility: CLINIC | Age: 59
End: 2019-07-23

## 2019-07-23 DIAGNOSIS — S63.502A WRIST SPRAIN, LEFT, INITIAL ENCOUNTER: ICD-10-CM

## 2019-07-24 ENCOUNTER — OFFICE VISIT - HEALTHEAST (OUTPATIENT)
Dept: PHYSICAL THERAPY | Facility: REHABILITATION | Age: 59
End: 2019-07-24

## 2019-07-24 DIAGNOSIS — I89.0 LYMPHEDEMA: ICD-10-CM

## 2019-07-24 DIAGNOSIS — R60.0 LOCALIZED EDEMA: ICD-10-CM

## 2019-07-25 ENCOUNTER — COMMUNICATION - HEALTHEAST (OUTPATIENT)
Dept: INTERNAL MEDICINE | Facility: CLINIC | Age: 59
End: 2019-07-25

## 2019-07-26 ENCOUNTER — COMMUNICATION - HEALTHEAST (OUTPATIENT)
Dept: PHYSICAL THERAPY | Facility: REHABILITATION | Age: 59
End: 2019-07-26

## 2019-07-26 ENCOUNTER — COMMUNICATION - HEALTHEAST (OUTPATIENT)
Dept: PALLIATIVE MEDICINE | Facility: OTHER | Age: 59
End: 2019-07-26

## 2019-07-26 DIAGNOSIS — G89.4 CHRONIC PAIN SYNDROME: ICD-10-CM

## 2019-07-30 ENCOUNTER — HOSPITAL ENCOUNTER (OUTPATIENT)
Dept: PALLIATIVE MEDICINE | Facility: OTHER | Age: 59
Discharge: HOME | End: 2019-07-30
Attending: NURSE PRACTITIONER

## 2019-07-30 ENCOUNTER — COMMUNICATION - HEALTHEAST (OUTPATIENT)
Dept: FAMILY MEDICINE | Facility: CLINIC | Age: 59
End: 2019-07-30

## 2019-07-30 DIAGNOSIS — G89.4 CHRONIC PAIN SYNDROME: ICD-10-CM

## 2019-07-30 DIAGNOSIS — F33.1 MODERATE EPISODE OF RECURRENT MAJOR DEPRESSIVE DISORDER (H): ICD-10-CM

## 2019-07-30 DIAGNOSIS — M25.50 POLYARTHRALGIA: ICD-10-CM

## 2019-07-30 DIAGNOSIS — M54.2 NECK PAIN: ICD-10-CM

## 2019-07-30 DIAGNOSIS — F41.9 ANXIETY: ICD-10-CM

## 2019-07-30 RX ORDER — DULOXETIN HYDROCHLORIDE 30 MG/1
CAPSULE, DELAYED RELEASE ORAL
Qty: 90 CAPSULE | Refills: 3 | Status: SHIPPED | OUTPATIENT
Start: 2019-07-30

## 2019-07-30 ASSESSMENT — MIFFLIN-ST. JEOR: SCORE: 1992.41

## 2019-07-31 ENCOUNTER — OFFICE VISIT - HEALTHEAST (OUTPATIENT)
Dept: VASCULAR SURGERY | Facility: CLINIC | Age: 59
End: 2019-07-31

## 2019-07-31 ENCOUNTER — RECORDS - HEALTHEAST (OUTPATIENT)
Dept: VASCULAR ULTRASOUND | Facility: CLINIC | Age: 59
End: 2019-07-31

## 2019-07-31 DIAGNOSIS — I73.9 PERIPHERAL VASCULAR DISEASE, UNSPECIFIED (H): ICD-10-CM

## 2019-07-31 DIAGNOSIS — I73.9 PAD (PERIPHERAL ARTERY DISEASE) (H): ICD-10-CM

## 2019-08-08 ENCOUNTER — COMMUNICATION - HEALTHEAST (OUTPATIENT)
Dept: PALLIATIVE MEDICINE | Facility: OTHER | Age: 59
End: 2019-08-08

## 2019-08-12 ENCOUNTER — COMMUNICATION - HEALTHEAST (OUTPATIENT)
Dept: ANTICOAGULATION | Facility: CLINIC | Age: 59
End: 2019-08-12

## 2019-08-15 ENCOUNTER — COMMUNICATION - HEALTHEAST (OUTPATIENT)
Dept: ANTICOAGULATION | Facility: CLINIC | Age: 59
End: 2019-08-15

## 2019-08-15 ENCOUNTER — AMBULATORY - HEALTHEAST (OUTPATIENT)
Dept: LAB | Facility: CLINIC | Age: 59
End: 2019-08-15

## 2019-08-15 DIAGNOSIS — Z86.718 HISTORY OF DVT (DEEP VEIN THROMBOSIS): ICD-10-CM

## 2019-08-15 LAB — INR PPP: 2.2 (ref 0.9–1.1)

## 2019-08-20 ENCOUNTER — RECORDS - HEALTHEAST (OUTPATIENT)
Dept: ADMINISTRATIVE | Facility: OTHER | Age: 59
End: 2019-08-20

## 2019-08-27 ENCOUNTER — COMMUNICATION - HEALTHEAST (OUTPATIENT)
Dept: FAMILY MEDICINE | Facility: CLINIC | Age: 59
End: 2019-08-27

## 2019-08-27 DIAGNOSIS — I10 ESSENTIAL HYPERTENSION: ICD-10-CM

## 2019-08-27 DIAGNOSIS — Z86.718 HISTORY OF DVT (DEEP VEIN THROMBOSIS): ICD-10-CM

## 2019-09-03 ENCOUNTER — HOSPITAL ENCOUNTER (OUTPATIENT)
Dept: PALLIATIVE MEDICINE | Facility: OTHER | Age: 59
Discharge: HOME | End: 2019-09-03
Attending: NURSE PRACTITIONER

## 2019-09-03 ENCOUNTER — COMMUNICATION - HEALTHEAST (OUTPATIENT)
Dept: FAMILY MEDICINE | Facility: CLINIC | Age: 59
End: 2019-09-03

## 2019-09-03 DIAGNOSIS — G89.4 CHRONIC PAIN SYNDROME: ICD-10-CM

## 2019-09-03 DIAGNOSIS — E56.9 VITAMIN DEFICIENCY: ICD-10-CM

## 2019-09-03 DIAGNOSIS — F41.9 ANXIETY: ICD-10-CM

## 2019-09-03 DIAGNOSIS — M25.50 POLYARTHRALGIA: ICD-10-CM

## 2019-09-03 DIAGNOSIS — E11.9 DIABETES MELLITUS (H): ICD-10-CM

## 2019-09-03 DIAGNOSIS — M54.2 NECK PAIN: ICD-10-CM

## 2019-09-03 DIAGNOSIS — E11.8 TYPE 2 DIABETES MELLITUS WITH COMPLICATION, WITHOUT LONG-TERM CURRENT USE OF INSULIN (H): ICD-10-CM

## 2019-09-03 ASSESSMENT — MIFFLIN-ST. JEOR: SCORE: 1969.73

## 2019-09-05 ENCOUNTER — RECORDS - HEALTHEAST (OUTPATIENT)
Dept: ADMINISTRATIVE | Facility: OTHER | Age: 59
End: 2019-09-05

## 2019-09-09 ENCOUNTER — OFFICE VISIT - HEALTHEAST (OUTPATIENT)
Dept: FAMILY MEDICINE | Facility: CLINIC | Age: 59
End: 2019-09-09

## 2019-09-09 DIAGNOSIS — M25.571 PAIN IN JOINT, ANKLE AND FOOT, RIGHT: ICD-10-CM

## 2019-09-09 DIAGNOSIS — G89.4 CHRONIC PAIN SYNDROME: ICD-10-CM

## 2019-09-09 DIAGNOSIS — M25.551 HIP PAIN, RIGHT: ICD-10-CM

## 2019-09-09 ASSESSMENT — ANXIETY QUESTIONNAIRES
4. TROUBLE RELAXING: NEARLY EVERY DAY
1. FEELING NERVOUS, ANXIOUS, OR ON EDGE: MORE THAN HALF THE DAYS
GAD7 TOTAL SCORE: 9
6. BECOMING EASILY ANNOYED OR IRRITABLE: MORE THAN HALF THE DAYS
2. NOT BEING ABLE TO STOP OR CONTROL WORRYING: NOT AT ALL
7. FEELING AFRAID AS IF SOMETHING AWFUL MIGHT HAPPEN: NOT AT ALL
3. WORRYING TOO MUCH ABOUT DIFFERENT THINGS: MORE THAN HALF THE DAYS
5. BEING SO RESTLESS THAT IT IS HARD TO SIT STILL: NOT AT ALL
IF YOU CHECKED OFF ANY PROBLEMS ON THIS QUESTIONNAIRE, HOW DIFFICULT HAVE THESE PROBLEMS MADE IT FOR YOU TO DO YOUR WORK, TAKE CARE OF THINGS AT HOME, OR GET ALONG WITH OTHER PEOPLE: NOT DIFFICULT AT ALL

## 2019-09-09 ASSESSMENT — PATIENT HEALTH QUESTIONNAIRE - PHQ9: SUM OF ALL RESPONSES TO PHQ QUESTIONS 1-9: 12

## 2019-09-11 ENCOUNTER — RECORDS - HEALTHEAST (OUTPATIENT)
Dept: ADMINISTRATIVE | Facility: OTHER | Age: 59
End: 2019-09-11

## 2019-09-12 ENCOUNTER — COMMUNICATION - HEALTHEAST (OUTPATIENT)
Dept: ANTICOAGULATION | Facility: CLINIC | Age: 59
End: 2019-09-12

## 2019-09-12 ENCOUNTER — RECORDS - HEALTHEAST (OUTPATIENT)
Dept: ANTICOAGULATION | Facility: CLINIC | Age: 59
End: 2019-09-12

## 2019-09-12 DIAGNOSIS — Z86.718 HISTORY OF DVT (DEEP VEIN THROMBOSIS): ICD-10-CM

## 2019-09-12 LAB — INR PPP: 3.2 (ref 0.9–1.1)

## 2019-09-26 ENCOUNTER — AMBULATORY - HEALTHEAST (OUTPATIENT)
Dept: PALLIATIVE MEDICINE | Facility: OTHER | Age: 59
End: 2019-09-26

## 2019-09-30 ENCOUNTER — RECORDS - HEALTHEAST (OUTPATIENT)
Dept: ADMINISTRATIVE | Facility: OTHER | Age: 59
End: 2019-09-30

## 2019-10-07 ENCOUNTER — COMMUNICATION - HEALTHEAST (OUTPATIENT)
Dept: ANTICOAGULATION | Facility: CLINIC | Age: 59
End: 2019-10-07

## 2019-10-07 DIAGNOSIS — Z86.718 HISTORY OF DVT (DEEP VEIN THROMBOSIS): ICD-10-CM

## 2019-10-07 LAB — INR PPP: 2.9 (ref 0.9–1.1)

## 2019-10-18 ENCOUNTER — COMMUNICATION - HEALTHEAST (OUTPATIENT)
Dept: ADMINISTRATIVE | Facility: CLINIC | Age: 59
End: 2019-10-18

## 2019-10-24 ENCOUNTER — AMBULATORY - HEALTHEAST (OUTPATIENT)
Dept: FAMILY MEDICINE | Facility: CLINIC | Age: 59
End: 2019-10-24

## 2019-10-24 DIAGNOSIS — Z12.11 SPECIAL SCREENING FOR MALIGNANT NEOPLASMS, COLON: ICD-10-CM

## 2019-10-28 ENCOUNTER — COMMUNICATION - HEALTHEAST (OUTPATIENT)
Dept: FAMILY MEDICINE | Facility: CLINIC | Age: 59
End: 2019-10-28

## 2019-10-28 DIAGNOSIS — E78.5 DYSLIPIDEMIA: ICD-10-CM

## 2019-10-28 RX ORDER — SIMVASTATIN 80 MG
TABLET ORAL
Qty: 90 TABLET | Refills: 3 | Status: SHIPPED | OUTPATIENT
Start: 2019-10-28

## 2019-10-29 ENCOUNTER — COMMUNICATION - HEALTHEAST (OUTPATIENT)
Dept: ANTICOAGULATION | Facility: CLINIC | Age: 59
End: 2019-10-29

## 2019-11-25 ENCOUNTER — COMMUNICATION - HEALTHEAST (OUTPATIENT)
Dept: ANTICOAGULATION | Facility: CLINIC | Age: 59
End: 2019-11-25

## 2019-11-25 DIAGNOSIS — Z86.718 HISTORY OF DVT (DEEP VEIN THROMBOSIS): ICD-10-CM

## 2019-11-25 LAB — INR PPP: 1.6 (ref 0.9–1.1)

## 2019-12-18 ENCOUNTER — COMMUNICATION - HEALTHEAST (OUTPATIENT)
Dept: ANTICOAGULATION | Facility: CLINIC | Age: 59
End: 2019-12-18

## 2019-12-18 DIAGNOSIS — Z86.718 HISTORY OF DVT (DEEP VEIN THROMBOSIS): ICD-10-CM

## 2019-12-18 LAB — INR PPP: 2.6 (ref 0.9–1.1)

## 2019-12-26 ENCOUNTER — RECORDS - HEALTHEAST (OUTPATIENT)
Dept: ANTICOAGULATION | Facility: CLINIC | Age: 59
End: 2019-12-26

## 2019-12-27 ENCOUNTER — COMMUNICATION - HEALTHEAST (OUTPATIENT)
Dept: FAMILY MEDICINE | Facility: CLINIC | Age: 59
End: 2019-12-27

## 2019-12-27 ENCOUNTER — OFFICE VISIT - HEALTHEAST (OUTPATIENT)
Dept: FAMILY MEDICINE | Facility: CLINIC | Age: 59
End: 2019-12-27

## 2019-12-27 ENCOUNTER — RECORDS - HEALTHEAST (OUTPATIENT)
Dept: GENERAL RADIOLOGY | Facility: CLINIC | Age: 59
End: 2019-12-27

## 2019-12-27 DIAGNOSIS — M25.511 ACUTE PAIN OF RIGHT SHOULDER: ICD-10-CM
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
## 2019-12-27 DIAGNOSIS — M25.511 PAIN IN RIGHT SHOULDER: ICD-10-CM
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
## 2020-01-03 ENCOUNTER — COMMUNICATION - HEALTHEAST (OUTPATIENT)
Dept: ANTICOAGULATION | Facility: CLINIC | Age: 60
End: 2020-01-03

## 2020-01-03 DIAGNOSIS — Z86.718 HISTORY OF DVT (DEEP VEIN THROMBOSIS): ICD-10-CM

## 2020-01-03 LAB — INR PPP: 2.4 (ref 0.9–1.1)

## 2020-01-08 ENCOUNTER — COMMUNICATION - HEALTHEAST (OUTPATIENT)
Dept: ANTICOAGULATION | Facility: CLINIC | Age: 60
End: 2020-01-08

## 2020-01-08 DIAGNOSIS — Z86.718 HISTORY OF DVT (DEEP VEIN THROMBOSIS): ICD-10-CM

## 2020-01-23 ENCOUNTER — COMMUNICATION - HEALTHEAST (OUTPATIENT)
Dept: PALLIATIVE MEDICINE | Facility: OTHER | Age: 60
End: 2020-01-23

## 2020-01-23 DIAGNOSIS — G89.4 CHRONIC PAIN SYNDROME: ICD-10-CM

## 2020-01-23 DIAGNOSIS — M25.50 POLYARTHRALGIA: ICD-10-CM

## 2020-01-23 DIAGNOSIS — M54.2 NECK PAIN: ICD-10-CM

## 2020-01-23 DIAGNOSIS — F41.9 ANXIETY: ICD-10-CM

## 2020-01-24 ENCOUNTER — COMMUNICATION - HEALTHEAST (OUTPATIENT)
Dept: ANTICOAGULATION | Facility: CLINIC | Age: 60
End: 2020-01-24

## 2020-01-27 RX ORDER — GABAPENTIN 300 MG/1
CAPSULE ORAL
Qty: 180 CAPSULE | Refills: 0 | Status: SHIPPED | OUTPATIENT
Start: 2020-01-27

## 2020-02-04 ENCOUNTER — COMMUNICATION - HEALTHEAST (OUTPATIENT)
Dept: ANTICOAGULATION | Facility: CLINIC | Age: 60
End: 2020-02-04

## 2020-02-04 DIAGNOSIS — Z86.718 HISTORY OF DVT (DEEP VEIN THROMBOSIS): ICD-10-CM

## 2020-02-04 LAB — INR PPP: 2.3 (ref 0.9–1.1)

## 2020-02-27 ENCOUNTER — COMMUNICATION - HEALTHEAST (OUTPATIENT)
Dept: FAMILY MEDICINE | Facility: CLINIC | Age: 60
End: 2020-02-27

## 2020-02-27 DIAGNOSIS — F32.A ANXIETY AND DEPRESSION: ICD-10-CM

## 2020-02-27 DIAGNOSIS — F41.9 ANXIETY AND DEPRESSION: ICD-10-CM

## 2020-02-27 RX ORDER — CITALOPRAM HYDROBROMIDE 20 MG/1
TABLET ORAL
Qty: 90 TABLET | Refills: 2 | Status: SHIPPED | OUTPATIENT
Start: 2020-02-27

## 2020-03-04 ENCOUNTER — COMMUNICATION - HEALTHEAST (OUTPATIENT)
Dept: ANTICOAGULATION | Facility: CLINIC | Age: 60
End: 2020-03-04

## 2020-03-04 DIAGNOSIS — Z86.718 HISTORY OF DVT (DEEP VEIN THROMBOSIS): ICD-10-CM

## 2020-03-04 LAB — INR PPP: 1.5 (ref 0.9–1.1)

## 2020-03-21 ENCOUNTER — COMMUNICATION - HEALTHEAST (OUTPATIENT)
Dept: FAMILY MEDICINE | Facility: CLINIC | Age: 60
End: 2020-03-21

## 2020-03-21 ENCOUNTER — COMMUNICATION - HEALTHEAST (OUTPATIENT)
Dept: PALLIATIVE MEDICINE | Facility: OTHER | Age: 60
End: 2020-03-21

## 2020-03-21 DIAGNOSIS — M25.50 POLYARTHRALGIA: ICD-10-CM

## 2020-03-21 DIAGNOSIS — E11.8 TYPE 2 DIABETES MELLITUS WITH COMPLICATION, WITHOUT LONG-TERM CURRENT USE OF INSULIN (H): ICD-10-CM

## 2020-03-21 DIAGNOSIS — M54.2 NECK PAIN: ICD-10-CM

## 2020-03-21 DIAGNOSIS — F41.9 ANXIETY: ICD-10-CM

## 2020-03-21 DIAGNOSIS — G89.4 CHRONIC PAIN SYNDROME: ICD-10-CM

## 2020-03-23 RX ORDER — PIOGLITAZONEHYDROCHLORIDE 30 MG/1
TABLET ORAL
Qty: 90 TABLET | Refills: 3 | Status: SHIPPED | OUTPATIENT
Start: 2020-03-23

## 2020-04-06 ENCOUNTER — COMMUNICATION - HEALTHEAST (OUTPATIENT)
Dept: FAMILY MEDICINE | Facility: CLINIC | Age: 60
End: 2020-04-06

## 2020-04-06 DIAGNOSIS — Z86.718 HISTORY OF DVT (DEEP VEIN THROMBOSIS): ICD-10-CM

## 2020-05-06 ENCOUNTER — COMMUNICATION - HEALTHEAST (OUTPATIENT)
Dept: FAMILY MEDICINE | Facility: CLINIC | Age: 60
End: 2020-05-06

## 2020-05-06 ENCOUNTER — RECORDS - HEALTHEAST (OUTPATIENT)
Dept: ADMINISTRATIVE | Facility: OTHER | Age: 60
End: 2020-05-06

## 2020-05-22 ENCOUNTER — COMMUNICATION - HEALTHEAST (OUTPATIENT)
Dept: ANTICOAGULATION | Facility: CLINIC | Age: 60
End: 2020-05-22

## 2020-06-06 ENCOUNTER — COMMUNICATION - HEALTHEAST (OUTPATIENT)
Dept: FAMILY MEDICINE | Facility: CLINIC | Age: 60
End: 2020-06-06

## 2020-06-06 DIAGNOSIS — E11.9 DIABETES MELLITUS (H): ICD-10-CM

## 2020-06-09 ENCOUNTER — RECORDS - HEALTHEAST (OUTPATIENT)
Dept: ADMINISTRATIVE | Facility: OTHER | Age: 60
End: 2020-06-09

## 2020-06-12 ENCOUNTER — COMMUNICATION - HEALTHEAST (OUTPATIENT)
Dept: ANTICOAGULATION | Facility: CLINIC | Age: 60
End: 2020-06-12

## 2020-06-22 ENCOUNTER — COMMUNICATION - HEALTHEAST (OUTPATIENT)
Dept: ANTICOAGULATION | Facility: CLINIC | Age: 60
End: 2020-06-22

## 2020-07-01 ENCOUNTER — COMMUNICATION - HEALTHEAST (OUTPATIENT)
Dept: FAMILY MEDICINE | Facility: CLINIC | Age: 60
End: 2020-07-01

## 2020-07-01 DIAGNOSIS — E11.8 TYPE 2 DIABETES MELLITUS WITH COMPLICATION, WITHOUT LONG-TERM CURRENT USE OF INSULIN (H): ICD-10-CM

## 2020-07-01 DIAGNOSIS — I10 ESSENTIAL HYPERTENSION: ICD-10-CM

## 2020-07-01 DIAGNOSIS — E11.9 DIABETES MELLITUS (H): ICD-10-CM

## 2020-07-20 ENCOUNTER — RECORDS - HEALTHEAST (OUTPATIENT)
Dept: ANTICOAGULATION | Facility: CLINIC | Age: 60
End: 2020-07-20

## 2020-07-25 ENCOUNTER — RECORDS - HEALTHEAST (OUTPATIENT)
Dept: ADMINISTRATIVE | Facility: OTHER | Age: 60
End: 2020-07-25

## 2020-08-24 ENCOUNTER — COMMUNICATION - HEALTHEAST (OUTPATIENT)
Dept: FAMILY MEDICINE | Facility: CLINIC | Age: 60
End: 2020-08-24

## 2020-08-24 DIAGNOSIS — Z86.718 HISTORY OF DVT (DEEP VEIN THROMBOSIS): ICD-10-CM

## 2020-08-24 RX ORDER — WARFARIN SODIUM 2.5 MG/1
TABLET ORAL
Qty: 135 TABLET | Refills: 0 | Status: SHIPPED | OUTPATIENT
Start: 2020-08-24

## 2020-09-05 ENCOUNTER — RECORDS - HEALTHEAST (OUTPATIENT)
Dept: ADMINISTRATIVE | Facility: OTHER | Age: 60
End: 2020-09-05

## 2020-09-08 ENCOUNTER — COMMUNICATION - HEALTHEAST (OUTPATIENT)
Dept: ANTICOAGULATION | Facility: CLINIC | Age: 60
End: 2020-09-08

## 2020-09-17 ENCOUNTER — COMMUNICATION - HEALTHEAST (OUTPATIENT)
Dept: FAMILY MEDICINE | Facility: CLINIC | Age: 60
End: 2020-09-17

## 2020-09-17 DIAGNOSIS — I10 ESSENTIAL HYPERTENSION: ICD-10-CM

## 2020-09-17 DIAGNOSIS — E11.9 DIABETES MELLITUS (H): ICD-10-CM

## 2020-09-17 DIAGNOSIS — E11.8 TYPE 2 DIABETES MELLITUS WITH COMPLICATION, WITHOUT LONG-TERM CURRENT USE OF INSULIN (H): ICD-10-CM

## 2020-12-08 ENCOUNTER — COMMUNICATION - HEALTHEAST (OUTPATIENT)
Dept: FAMILY MEDICINE | Facility: CLINIC | Age: 60
End: 2020-12-08

## 2020-12-08 DIAGNOSIS — E11.9 DIABETES MELLITUS (H): ICD-10-CM

## 2020-12-08 DIAGNOSIS — E11.8 TYPE 2 DIABETES MELLITUS WITH COMPLICATION, WITHOUT LONG-TERM CURRENT USE OF INSULIN (H): ICD-10-CM

## 2020-12-08 DIAGNOSIS — I10 ESSENTIAL HYPERTENSION: ICD-10-CM

## 2020-12-09 RX ORDER — GLIPIZIDE 10 MG/1
TABLET, FILM COATED, EXTENDED RELEASE ORAL
Qty: 60 TABLET | Refills: 0 | Status: SHIPPED | OUTPATIENT
Start: 2020-12-09

## 2020-12-09 RX ORDER — LISINOPRIL 10 MG/1
TABLET ORAL
Qty: 30 TABLET | Refills: 0 | Status: SHIPPED | OUTPATIENT
Start: 2020-12-09

## 2021-03-03 ENCOUNTER — COMMUNICATION - HEALTHEAST (OUTPATIENT)
Dept: FAMILY MEDICINE | Facility: CLINIC | Age: 61
End: 2021-03-03

## 2021-03-03 DIAGNOSIS — F32.A ANXIETY AND DEPRESSION: ICD-10-CM

## 2021-03-03 DIAGNOSIS — E11.9 DIABETES MELLITUS (H): ICD-10-CM

## 2021-03-03 DIAGNOSIS — I10 ESSENTIAL HYPERTENSION: ICD-10-CM

## 2021-03-03 DIAGNOSIS — E11.8 TYPE 2 DIABETES MELLITUS WITH COMPLICATION, WITHOUT LONG-TERM CURRENT USE OF INSULIN (H): ICD-10-CM

## 2021-03-03 DIAGNOSIS — F41.9 ANXIETY AND DEPRESSION: ICD-10-CM

## 2021-05-26 ENCOUNTER — RECORDS - HEALTHEAST (OUTPATIENT)
Dept: ADMINISTRATIVE | Facility: CLINIC | Age: 61
End: 2021-05-26

## 2021-05-26 ASSESSMENT — PATIENT HEALTH QUESTIONNAIRE - PHQ9: SUM OF ALL RESPONSES TO PHQ QUESTIONS 1-9: 12

## 2021-05-26 NOTE — TELEPHONE ENCOUNTER
"Patient calls to request a refill of tramadol.  \"my primary doctor is out of town and I will run out of my tramadol\"  Chart review:  Patient had a consult apt with [facility] on 2/28(first apt)  UDS results:  Notes recorded by Sarah Sommer CNP on 3/4/2019 at 12:32 PM CST  Reviewed Consult note 2/28/19 \"Last dose of medication was Hydrocodone last dose-02/27/2019 @ 900pm; Tramadol last dose- 02/28/2019 @ 830am, alcohol yesterday, marijuana couple of days ago \"  UDT:  Detected tramadol (expected); Detected ethyl glucuronide (reported use the day before testing); Detected marijuana (reported use a couple of days prior to testing); Detected hydrocodone and metabolite (report)     Next ov: 4/2 with     Please review    "

## 2021-05-26 NOTE — TELEPHONE ENCOUNTER
Controlled Substance Refill Request  Medication:   Requested Prescriptions     Pending Prescriptions Disp Refills     traMADol (ULTRAM) 50 mg tablet [Pharmacy Med Name: traMADol HCl Oral Tablet 50 MG] 60 tablet 0     Sig: TAKE 1 TABLET BY MOUTH TWICE DAILY IF NEEDED FOR PAIN     Date Last Fill: 2/21/19  Pharmacy:   [facility] Submit electronically to pharmacy  Controlled Substance Agreement on File:   Encounter-Level CSA Scan Date:    There are no encounter-level csa scan date.       Last office visit: Last office visit pertaining to requested medication was 1/29/19.

## 2021-05-26 NOTE — TELEPHONE ENCOUNTER
I have not assumed care of his opioid medication and he was to schedule for a diagnostic assessment with behavioral medicine. I do not see that he has returned the paperwork provided to him the day of his consult to the front to aid with getting him scheduled. We could give him withdrawal medication.

## 2021-05-26 NOTE — TELEPHONE ENCOUNTER
Patient Returning Call  Reason for call:  [facility]  Information relayed to patient:  Let patient know below information.  Patient reports he only saw the pain clinic for a consult, they have not prescribed him anything yet.    He states he got 60 tablets from Gilberto Arnold MD on 2/20/2019.    Patient is not sure where clinic is coming up with 135 tablets.    He is going to contact the pain clinic but does not return to see them until April and was told they will not prescribe anything until his 2nd appt, which has not occurred yet.    Patient is out of medication and is worried about having to be in pain for the next week because Gilberto Arnold MD is not in.    Patient wants to verify where clinic is getting information that he got 135 tablets on 2/28/2019.  Patient has additional questions:  Yes  If YES, what are your questions/concerns:  See above  Okay to leave a detailed message?: Yes

## 2021-05-27 ENCOUNTER — RECORDS - HEALTHEAST (OUTPATIENT)
Dept: ADMINISTRATIVE | Facility: CLINIC | Age: 61
End: 2021-05-27
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
## 2021-05-27 NOTE — PROGRESS NOTES
Patient is here for a follow up with Ginette Sommer CNP for his chronic pain        *Universal Precautions:   UDT/Swab-  2/28/2019   Consent- if prescribing opioids  Agreement- if prescribing opioids  Pharmacy- as documented   Count- n/a  Psychological evaluation - information provided  Pharmacogenetic testing- n/a  MME- n/a  MTM - consider  Naloxone safety: n/a
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
## 2021-05-27 NOTE — TELEPHONE ENCOUNTER
I reached Micky to help schedule a heme consult per his primary md, Dr. Feliciano, for  Enlarged lymph nodes. I have scheduled him to come see Dr. Farmer on Monday, 4-15-19 at 1300/1230 check in. I have explained what to expect at the appt. I have notified the referring provider of the appt date and time and provider. I will mail pt a welcome letter with appt details as well as HH form to complete and medication/ allergy list to review and update. I have instructed him to bring these to the appointment.      I have given Micky my contact information and invited calls.

## 2021-05-27 NOTE — TELEPHONE ENCOUNTER
Left message to call back for: Brannon  Information to relay to patient:  Per Dr. Mata (covering MD) - in order to refill this Rx, pt must be seen in clinic for OV or wait for PCP to return on 01/04/2019.

## 2021-05-27 NOTE — TELEPHONE ENCOUNTER
ANTICOAGULATION  MANAGEMENT PROGRAM    Brannon Robb is overdue for INR check.  Reminder call made.    Spoke with Brannon. Instructed Brannon to test INR and call result into home monitoring company as soon as possible.    Carmina Banks RN

## 2021-05-27 NOTE — TELEPHONE ENCOUNTER
Patient left message wanting refill and has not heard about scheduling BH. I called patient back and informed him of Ginette's message. Patient will bring in filled out paperwork.
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
## 2021-05-27 NOTE — PATIENT INSTRUCTIONS - HE
Plan:   Plan of Care / NextSteps:     Follow up the following date: 4 weeks      Education:   Please call Monday-Friday for problems or questions and one of the clinical support staff (CSS) will help to get things figured out. The number is [phone].     [employer] is a means to send an e-mail ([employer] message) to communicate any concerns.     Please remember some issues require an office visit.     Today we reviewed the plan of care and answered questions.    ____________________________  Medical cannabis has been approved for specific qualifying conditions in [place].    Your medical staff does not prescribe medical cannabis. Marijuana is classified as a Schedule I substance per the [place]-this is a Federal Agency. Medical providers are not able to prescribe Schedule I drugs. The [facility] has approved medical cannabis for Qualifying Conditions. A person would need to be Certified as having a Qualifying Condition. A registered medical provider may Certify the Qualifying Condition.    Once Certified with a Qualifying Condition a person can schedule with a Dispensary. The Dispensary staff would determine the preparation of medical cannabis. It is expected the Dispensary would be following a person to determine the impact of the medical cannabis. During these visit with the Dispensary staff adjustments in the medical cannabis would be made. The goal of the visits to to provide adjustments, monitor for harm, improve side effects and promote the greatest impact on the Qualifying Condition.     Note: We expect you will reduce w/ a goal of full discontinuation, any opioid pain medication. We will need to figure out treatment for any out of state travel.    Medical cannabis is not obtained from another state, friends, from the streets or grown in your own home. In the [facility] it is expected a person who has Qualified would obtain the medical cannabis from one of the approved Dispensaries.      The make-up of medical  "cannabis typically has lower THC levels. THC is responsible for the \"high\" associated with recreational marijuana. There is typically a ratio between THC and CBD levels. Folks may get CBD over the counter or order online as CBD does not cause the \"high\".    I expect to learn more about the risks, benefits and legal issues over time. This is not a treatment that has been well researched due to the schedule I status. It will be important for participants to provide information on side effects and benefits. It does not have strong medical data to support. If you have been reluctant to participate in other recommended treatments because of concerns about being a \"guinea pig' this is not a good option for your health care.    At this time medical cannabis is not covered by medical insurance. Costs include but may not be limited to: registration fee; visit fee and product fee. You would need to be in communication with the medical cannabis program for the greatest insights about the cost.    If this is an area of interest please research at www.health.[facility].[place]/topics/cannabis. www.FDA.gov    _______________________________  Important information and warnings about using medical cannabis:  *Use of medical cannabis products is experimental  *Common side effects include dizziness, fatigue, dry mouth, lightheadedness, drowsiness, nausea  *Tell all your health care professionals about the medical cannabis you are using  *The following groups are at increased risk of harm from use - those under 18, women who are pregnant or breast-feeding, persons with a personal or family history of psychotic disorder  *Risks for use by persons with serious heart or liver disease  *Do not drive, operate machinery, or do work that could harm people under the influence of medical cannabis  *Keep medication secure and in their original containers  *Do not use medical cannabis where it is illegal  *Do not give or sell to others medical cannabis " "that you purchase  *Risk of dependence and addiction (similar to caffeine)  _____________________________________  Please review the following websites for information on locations, and also other frequently asked questions:  https://[employer]  https://[employer]  Http://www.[employer].[facility].[place]/index.html (look on the right column \"Featured Sites\" and choose Medical Cannabis tab  ____________________________________  You need to be off the street marijuana and the urine drug testing would need to be clear of THC and alcohol    Records: Reviewed to assist with preparation for the office visit and are reflected throughout the note.    Primary Care: You need to have an annual physical and check in with your primary care folks on a regular basis. Talk with your primary care about the frequency of expected visits.     Behavioral Medicine: Thank you for getting scheduled with Cici Taylor     Rehabilitation: You are starting PT for the left shoulder    Diagnostics:   Notes recorded by Sarah Sommer CNP on 3/4/2019 at 12:32 PM CST  Reviewed Consult note 2/28/19 \"Last dose of medication was Hydrocodone last dose-02/27/2019 @ 900pm; Tramadol last dose- 02/28/2019 @ 830am, alcohol yesterday, marijuana couple of days ago \"  UDT:  Detected tramadol (expected); Detected ethyl glucuronide (reported use the day before testing); Detected marijuana (reported use a couple of days prior to testing); Detected hydrocodone and metabolite (report)    Integrative: I would like to see the sugar substitute move to Stevia in the foods you purchase.     I gave you information on the easing of inflammation.     [facility] information was provided.     Medication prescribed / to be continued:   Medication prescribed today:    Requested Prescriptions     Signed Prescriptions Disp Refills     gabapentin (NEURONTIN) 300 MG capsule 60 capsule 0     Sig: Take 1 capsule (300 mg total) by mouth 4 (four) times a day for 15 " days.

## 2021-05-28 ENCOUNTER — RECORDS - HEALTHEAST (OUTPATIENT)
Dept: ADMINISTRATIVE | Facility: CLINIC | Age: 61
End: 2021-05-28

## 2021-05-28 ASSESSMENT — ANXIETY QUESTIONNAIRES: GAD7 TOTAL SCORE: 9

## 2021-05-28 NOTE — PATIENT INSTRUCTIONS - HE
Plan:   Plan of Care / NextSteps:     Follow up by: 1 month      Education:   Please call Monday-Friday for problems or questions and one of the clinical support staff (CSS) will help to get things figured out. The number is [phone].     [employer] is a means to send an e-mail ([employer] message) to communicate any concerns.     Please remember some issues require an office visit.     Today we reviewed the plan of care and answered questions.      Records: Reviewed to assist with preparation for the office visit and are reflected throughout the note.    Primary Care: You need to have an annual physical and check in with your primary care folks on a regular basis. Talk with your primary care about the frequency of expected visits.     Behavioral Medicine: I would like you to schedule a Rule 25 to offer support with stopping the alcohol and marijuana. Please call [phone]    Rehabilitation: Keep up with the PT for your shoulder    Social: I understand you are electing to sell your home and relocate to [place]    Integrative Approaches:   I would like to see the sugar substitute move to Stevia in the foods you purchase.      I previously gave you information on the easing of inflammation.      [facility] information was provided      Diagnostics:   Please collect the vitamin D and magnesium      Medication prescribed / to be continued:   Medication prescribed today: I am increasing the gabapentin today to a therapeutic range. I think it will also help with sleep at night    Requested Prescriptions     Signed Prescriptions Disp Refills     gabapentin (NEURONTIN) 300 MG capsule 180 capsule 0     Sig: Take 1 three times a day and 3 at bedtime.     Supplement:  Melatonin start with 3 mg about 1 hours before bed. This can be increased if needed  Magnesium start low and work up to 500mg at bedtime this can also be supportive for sleep.

## 2021-05-28 NOTE — PROGRESS NOTES
Assessment: Dated 5/10/2019 HERMINIO Score: 56    Pain Intensity:  The pain is moderate at the moment    Personal Care:.  It is painful to look after myself and I am slow and careful    Lifting:  I can lift very light weights    Walking:  Pain prevents me from walking more than 100 yards    Sitting:   Pain prevents me from sitting more than 30 minutes    Standing  Pain prevents me from standing more than 10 minutes    Sleeping  Because of pain I have less than 4 hours sleep    Sex Life:  N/A    Social life:  I have no social life because of pain    Traveling:  Pain is bad but I manage journeys over 2 hours

## 2021-05-28 NOTE — TELEPHONE ENCOUNTER
Completed  Requested Prescriptions     Signed Prescriptions Disp Refills     ergocalciferol (VITAMIN D2) 50,000 unit capsule 4 capsule 2     Sig: Take 1 capsule (50,000 Units total) by mouth every 7 days.     Authorizing Provider: FELICE WILSON

## 2021-05-28 NOTE — TELEPHONE ENCOUNTER
ANTICOAGULATION  MANAGEMENT-Patient Home Monitoring Result    Assessment     Therapeutic INR result of 2.6 (goal INR of 2.0-3.0) received via fax from [employer]. home INR monitoring company.        Previous INR was Therapeutic    Brannon Robb last contacted by phone: 3/20/19    Plan     Per home monitoring agreement with patient, patient was NOT contacted regarding therapeutic result today.  Patient is to continue current dose and continue to checking INR with home monitor every 1-2 week(s) per protocol.  ?   Vivian Diaz RN    Subjective/Objective:      Brannon Robb, a 59 y.o. male  is established on warfarin.     Brannon Robb is using a home INR monitor. Home INR result received via fax today.     Anticoagulation Episode Summary     Current INR goal:   2.0-3.0   TTR:   75.0 % (4.1 y)   Next INR check:   5/21/2019   INR from last check:   2.60 (5/7/2019)   Weekly max warfarin dose:      Target end date:      INR check location:      Preferred lab:      Send INR reminders to:   ANTICOAGULATION POOL A (WBY,WBE,MID,RSC)    Indications    History of DVT (deep vein thrombosis) [Z86.718]           Comments:            Anticoagulation Care Providers     Provider Role Specialty Phone number    Gilberto Arnold MD Referring Family Medicine [phone]

## 2021-05-28 NOTE — TELEPHONE ENCOUNTER
ANTICOAGULATION  MANAGEMENT-Patient Home Monitoring Result    Assessment     Therapeutic INR result of 3.0 (goal INR of 2.0-3.0) received via fax from [email or web address] home INR monitoring company.        Previous INR was Subtherapeutic    Brannon Robb last contacted by phone: 3/20/19    Plan     Per home monitoring agreement with patient, patient was NOT contacted regarding therapeutic result today.  Patient is to continue current dose and continue to checking INR with home monitor every 1-2 week(s) per protocol.  ?   Christiana Lorenz RN    Subjective/Objective:      Brannon Robb, a 59 y.o. male  is established on warfarin.     Brannon Robb is using a home INR monitor. Home INR result received via fax today.     Anticoagulation Episode Summary     Current INR goal:   2.0-3.0   TTR:   74.7 % (4.1 y)   Next INR check:   5/6/2019   INR from last check:   3.00 (4/22/2019)   Weekly max warfarin dose:      Target end date:      INR check location:      Preferred lab:      Send INR reminders to:   ANTICOAGULATION POOL A (WBY,WBE,MID,RSC)    Indications    History of DVT (deep vein thrombosis) [Z86.718]           Comments:            Anticoagulation Care Providers     Provider Role Specialty Phone number    Gilberto Arnold MD Referring Family Medicine [phone]

## 2021-05-28 NOTE — TELEPHONE ENCOUNTER
Completed  Requested Prescriptions     Signed Prescriptions Disp Refills     gabapentin (NEURONTIN) 300 MG capsule 60 capsule 0     Sig: Take 1 capsule (300 mg total) by mouth 4 (four) times a day for 15 days.     Authorizing Provider: FELICE WILSON

## 2021-05-28 NOTE — TELEPHONE ENCOUNTER
Controlled Substance Refill Request  Medication:   Requested Prescriptions     Pending Prescriptions Disp Refills     traMADol (ULTRAM) 50 mg tablet [Pharmacy Med Name: traMADol HCl Oral Tablet 50 MG] 60 tablet 0     Sig: TAKE ONE TABLET BY MOUTH TWICE DAILY AS NEEDED FOR PAIN     Date Last Fill: 4/24/19  Pharmacy: [facility]   Submit electronically to pharmacy  Controlled Substance Agreement on File:   Encounter-Level CSA Scan Date:    There are no encounter-level csa scan date.       Last office visit: Last office visit pertaining to requested medication was 4/17/19.  Heather Zaragoza RN, BSN [facility] Triage Nurse

## 2021-05-28 NOTE — TELEPHONE ENCOUNTER
RN cannot approve Refill Request    RN can NOT refill this medication Protocol failed and NO refill given.        Sherita Gibson, [facility] Triage/Med Refill 5/15/2019    Requested Prescriptions   Pending Prescriptions Disp Refills     pioglitazone (ACTOS) 30 MG tablet [Pharmacy Med Name: Pioglitazone HCl Oral Tablet 30 MG] 90 tablet 0     Sig: TAKE ONE TABLET BY MOUTH ONE TIME DAILY       Pioglitazone Protocol Failed - 5/14/2019 11:13 AM        Failed - Microalbumin in last year     Microalbumin, Random Urine   Date Value Ref Range Status   07/13/2017 33.59 (H) 0.00 - 1.99 mg/dL Final                  Passed - Blood pressure in last 12 months     BP Readings from Last 1 Encounters:   05/10/19 127/80             Passed - LFT or AST or ALT in last 12 months     Albumin   Date Value Ref Range Status   04/15/2019 4.0 3.5 - 5.0 g/dL Final     Bilirubin, Total   Date Value Ref Range Status   04/15/2019 0.4 0.0 - 1.0 mg/dL Final     Bilirubin, Direct   Date Value Ref Range Status   03/04/2015 0.2 <=0.5 mg/dL Final     Alkaline Phosphatase   Date Value Ref Range Status   04/15/2019 61 45 - 120 U/L Final     AST   Date Value Ref Range Status   04/15/2019 22 0 - 40 U/L Final     ALT   Date Value Ref Range Status   04/15/2019 15 0 - 45 U/L Final     Protein, Total   Date Value Ref Range Status   04/15/2019 7.7 6.0 - 8.0 g/dL Final                Passed - Visit with PCP or prescribing provider visit in last 6 months      Last office visit with prescriber/PCP: 4/17/2019 OR same dept: 4/17/2019 Gilberto Arnold MD OR same specialty: 4/17/2019 Gilberto Arnold MD Last physical: 1/29/2019 Last MTM visit: Visit date not found         Next appt within 3 mo: Visit date not found  Next physical within 3 mo: Visit date not found  Prescriber OR PCP: Gilberto Bravo MD  Last diagnosis associated with med order: 1. Type 2 diabetes mellitus with complication, without long-term current use of  insulin (H)  - pioglitazone (ACTOS) 30 MG tablet [Pharmacy Med Name: Pioglitazone HCl Oral Tablet 30 MG]; TAKE ONE TABLET BY MOUTH ONE TIME DAILY   Dispense: 90 tablet; Refill: 0     If protocol passes may refill for 12 months if within 3 months of last provider visit (or a total of 15 months).           Passed - A1C in last 6 months     Hemoglobin A1c   Date Value Ref Range Status   01/29/2019 6.3 (H) 3.5 - 6.1 % Final               Passed - Serum creatinine in last year     Creatinine   Date Value Ref Range Status   04/15/2019 0.78 0.70 - 1.30 mg/dL Final

## 2021-05-28 NOTE — TELEPHONE ENCOUNTER
"Controlled Substance Refill Request  Medication Name:  Tramadol 50 mg tablet; one by mouth twice daily if needed for pain  Date Last Fill: 3/25/19  Quantity: 60  Number of refills: 0  Controlled Substance Agreement on File: No   Early Request:  No  Plan Last OV:   Last office visit addressing: Chronic Pain  Date: 4/17/19  Copy/Past OV Follow-up Plan:    \"He is still considering moving to [place] as he finds the warm weather to be more helpful for his chronic pain.  He anticipates moving later in the year\"    \"Chronic Pain Syndrome (hips, knees, back, shoulders)\"    Juliana Mina, BROOKS, BAN, [facility] RN Triage, 11p-7a      "

## 2021-05-28 NOTE — PROGRESS NOTES
Pt here for follow up with Sarah Sommer CNP, for Back, Hip and shoulder pain        *Universal Precautions:   UDT/Swab-  2/28/2019   Consent- if prescribing opioids  Agreement- if prescribing opioids  Pharmacy- as documented   Count- n/a  Psychological evaluation - 04/02/2019 with Cici Taylor  Pharmacogenetic testing- n/a  MME- n/a  MTM - consider  Naloxone safety: n/a

## 2021-05-28 NOTE — TELEPHONE ENCOUNTER
Patient is being seen at the Pain clinic and last appt was on 4/2/19  Last seen by Dr. Feliciano for pain/med check was on 10/17/2018  Last refill of tramadol was 3/25/2019  CSA - no CSA on file.    checked today and last refill of tramadol was on 3/25/2019  Miriam Roe LPN

## 2021-05-28 NOTE — TELEPHONE ENCOUNTER
Mr. Robb did not show up for the appointment with me today.  So I called him on his phone.  He says that he was not aware of the appointment scheduled today with me.    I discussed with him that the CT scan of the chest abdomen and pelvis that was done on 4/19/19 really did not show any increase in the size of the lymph nodes.  In fact he has some minimally enlarged lymph nodes in the glendy hepatis and gastrohepatic ligament region that have actually slightly decreased space.  The right inguinal lymph node has also decreased in size compared to last year.  Thus I do not think he needs further workup for lymphoma/malignancy involving the lymph nodes.  The lymph nodes have been approximately this size, especially in the glendy hepatis region since 2007.  However there is concern for a developing liver fibrosis in the CT scan.  I suspect that most of his problems are due to liver and possibly pancreas problems.  I suspect that he is developing cirrhosis and possibly chronic pancreatitis.  I recommended a referral to gastroenterology.  He was agreeable to that.    At this point I do not think he needs ongoing follow-up with oncology unless something new develops.  He was agreeable to the plan.    Diane Farmer MD      CC: Jodie Bravo, Gilberto Prieto MD

## 2021-05-29 ENCOUNTER — RECORDS - HEALTHEAST (OUTPATIENT)
Dept: ADMINISTRATIVE | Facility: CLINIC | Age: 61
End: 2021-05-29

## 2021-05-29 NOTE — TELEPHONE ENCOUNTER
ANTICOAGULATION  MANAGEMENT PROGRAM    Brannon Robb is overdue for INR check.  Reminder call made.    Spoke with Bryn. Instructed Brannon to test INR and call result into home monitoring company as soon as possible.    Christiana Lorenz RN

## 2021-05-29 NOTE — TELEPHONE ENCOUNTER
----- Message from Jason Palacios MD sent at 6/7/2019  6:14 PM CDT -----  Please inform the patient that the ultrasound done for the pain in his right calf did not show any blood clots.

## 2021-05-29 NOTE — TELEPHONE ENCOUNTER
Let pt know Dr. Palacios's message below. Verablized understanding and no further questions at this time.   Padmini Buckley, A

## 2021-05-29 NOTE — PATIENT INSTRUCTIONS - HE
Plan:   Plan of Care / NextSteps:     Follow up by: 6 weeks    Education:   Please call Monday-Friday for problems or questions and one of the clinical support staff (CSS) will help to get things figured out. The number is [phone].     [employer] is a means to send an e-mail ([employer] message) to communicate any concerns.     Please remember some issues require an office visit.     Today we reviewed the plan of care and answered questions.      Records: I am getting the records from [facility] r/t imaging. My goal is to look at anything related to the right lower extremity but IO am hoping you have some imaging of the right hip to help with guiding care.     Primary Care: You need to have an annual physical and check in with your primary care folks on a regular basis. Talk with your primary care about the frequency of expected visits.     Behavioral Medicine: schedule a Rule 25 to offer support with stopping the alcohol and marijuana. Please call [phone]    Rehabilitation: lymphedema clinic    I gave you the standing the rocking exercises for the hips      Medication prescribed / to be continued:   Medication prescribed today:    Requested Prescriptions     Signed Prescriptions Disp Refills     gabapentin (NEURONTIN) 300 MG capsule 180 capsule 1     Sig: TAKE 1 CAPSULE BY MOUTH THREE TIMES DAILY AND 3 CAPUSLES AT BEDTIME     ergocalciferol (VITAMIN D2) 50,000 unit capsule 4 capsule 2     Sig: Take 1 capsule (50,000 Units total) by mouth every 7 days.

## 2021-05-29 NOTE — PROGRESS NOTES
"Brannon Robb was last seen on 1/10/2019 for his left-sided neck pain associated with Left C4-5 facet degeneration.  In interim, he has undergone a Left C4-5 LATRICE with pain relief for 2 months. He did see an orthopod for his left shoulder injury and had a rotator cuff repair done last month.  Today he returns in follow up. He presents with a chief complaint of worsened pain in his neck and both arms for the past 3 weeks. \"My hands fall asleep\". He reports paresthesias in his fingers bilaterally. Gait and balance are normal.  Kalie Hernandes RN, CNRN    "

## 2021-05-29 NOTE — TELEPHONE ENCOUNTER
ANTICOAGULATION MANAGEMENT PROGRAM--Non-Compliance Chart Review    Brannon Robb is overdue for INR follow up.      INR Results:   Lab Results   Component Value Date    INR 2.60 (!) 05/07/2019    INR 3.00 (!) 04/22/2019    INR 1.50 (!) 03/20/2019         Chart reviewed, continue non-compliance protocol       Chelsea Wright RN

## 2021-05-29 NOTE — TELEPHONE ENCOUNTER
Refill request for medication: Tramadol  Last visit addressing this medication: 1/29/19  Follow up plan No follow up plan indicated    Last refill on 4/24/19, quantity #60   CSA completed NO   checked  05/21/19 , last dispensed per  4/24/19    Appointment: Not due     Lanny Mcclain CMA
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
## 2021-05-29 NOTE — TELEPHONE ENCOUNTER
RN cannot approve Refill Request    RN can NOT refill this medication overdue for office visits and/or labs.    Cristopher Colbert, [facility] Triage/Med Refill 6/3/2019    Requested Prescriptions   Pending Prescriptions Disp Refills     metFORMIN (GLUCOPHAGE) 1000 MG tablet [Pharmacy Med Name: metFORMIN HCl Oral Tablet 1000 MG] 180 tablet 0     Sig: TAKE ONE TABLET BY MOUTH TWICE DAILY       Metformin Refill Protocol Failed - 6/3/2019  8:52 AM        Failed - Microalbumin in last year      Microalbumin, Random Urine   Date Value Ref Range Status   07/13/2017 33.59 (H) 0.00 - 1.99 mg/dL Final                  Passed - Blood pressure in last 12 months     BP Readings from Last 1 Encounters:   05/10/19 127/80             Passed - LFT or AST or ALT in last 12 months     Albumin   Date Value Ref Range Status   04/15/2019 4.0 3.5 - 5.0 g/dL Final     Bilirubin, Total   Date Value Ref Range Status   04/15/2019 0.4 0.0 - 1.0 mg/dL Final     Bilirubin, Direct   Date Value Ref Range Status   03/04/2015 0.2 <=0.5 mg/dL Final     Alkaline Phosphatase   Date Value Ref Range Status   04/15/2019 61 45 - 120 U/L Final     AST   Date Value Ref Range Status   04/15/2019 22 0 - 40 U/L Final     ALT   Date Value Ref Range Status   04/15/2019 15 0 - 45 U/L Final     Protein, Total   Date Value Ref Range Status   04/15/2019 7.7 6.0 - 8.0 g/dL Final                Passed - GFR or Serum Creatinine in last 6 months     GFR MDRD Non Af Amer   Date Value Ref Range Status   04/15/2019 >60 >60 mL/min/1.73m2 Final     GFR MDRD Af Amer   Date Value Ref Range Status   04/15/2019 >60 >60 mL/min/1.73m2 Final             Passed - Visit with PCP or prescribing provider visit in last 6 months or next 3 months     Last office visit with prescriber/PCP: 4/17/2019 OR same dept: 4/17/2019 Gilberto Arnold MD OR same specialty: 4/17/2019 Gilberto Arnold MD Last physical: 1/29/2019 Last MTM visit: Visit date not found          Next appt within 3 mo: Visit date not found  Next physical within 3 mo: Visit date not found  Prescriber OR PCP: Gilberto Bravo MD  Last diagnosis associated with med order: 1. Diabetes mellitus (H)  - metFORMIN (GLUCOPHAGE) 1000 MG tablet [Pharmacy Med Name: metFORMIN HCl Oral Tablet 1000 MG]; TAKE ONE TABLET BY MOUTH TWICE DAILY   Dispense: 180 tablet; Refill: 0  - glipiZIDE (GLUCOTROL XL) 10 MG 24 hr tablet [Pharmacy Med Name: glipiZIDE ER Oral Tablet Extended Release 24 Hour 10 MG]; TAKE TWO TABLETS BY MOUTH  DAILY   Dispense: 180 tablet; Refill: 0    2. Type 2 diabetes mellitus with complication, without long-term current use of insulin (H)  - metFORMIN (GLUCOPHAGE) 1000 MG tablet [Pharmacy Med Name: metFORMIN HCl Oral Tablet 1000 MG]; TAKE ONE TABLET BY MOUTH TWICE DAILY   Dispense: 180 tablet; Refill: 0     If protocol passes may refill for 12 months if within 3 months of last provider visit (or a total of 15 months).           Passed - A1C in last 6 months     Hemoglobin A1c   Date Value Ref Range Status   01/29/2019 6.3 (H) 3.5 - 6.1 % Final               glipiZIDE (GLUCOTROL XL) 10 MG 24 hr tablet [Pharmacy Med Name: glipiZIDE ER Oral Tablet Extended Release 24 Hour 10 MG] 180 tablet 0     Sig: TAKE TWO TABLETS BY MOUTH DAILY       Oral Hypoglycemics Refill Protocol Failed - 6/3/2019  8:52 AM        Failed - Microalbumin in last year      Microalbumin, Random Urine   Date Value Ref Range Status   07/13/2017 33.59 (H) 0.00 - 1.99 mg/dL Final                  Passed - Visit with PCP or prescribing provider visit in last 6 months       Last office visit with prescriber/PCP: 4/17/2019 OR same dept: 4/17/2019 Gilberto Arnold MD OR same specialty: 4/17/2019 Gilberto Arnold MD Last physical: 1/29/2019 Last MTM visit: Visit date not found         Next appt within 3 mo: Visit date not found  Next physical within 3 mo: Visit date not found  Prescriber OR PCP: Gilberto  Ida Bravo MD  Last diagnosis associated with med order: 1. Diabetes mellitus (H)  - metFORMIN (GLUCOPHAGE) 1000 MG tablet [Pharmacy Med Name: metFORMIN HCl Oral Tablet 1000 MG]; TAKE ONE TABLET BY MOUTH TWICE DAILY   Dispense: 180 tablet; Refill: 0  - glipiZIDE (GLUCOTROL XL) 10 MG 24 hr tablet [Pharmacy Med Name: glipiZIDE ER Oral Tablet Extended Release 24 Hour 10 MG]; TAKE TWO TABLETS BY MOUTH  DAILY   Dispense: 180 tablet; Refill: 0    2. Type 2 diabetes mellitus with complication, without long-term current use of insulin (H)  - metFORMIN (GLUCOPHAGE) 1000 MG tablet [Pharmacy Med Name: metFORMIN HCl Oral Tablet 1000 MG]; TAKE ONE TABLET BY MOUTH TWICE DAILY   Dispense: 180 tablet; Refill: 0     If protocol passes may refill for 12 months if within 3 months of last provider visit (or a total of 15 months).           Passed - A1C in last 6 months     Hemoglobin A1c   Date Value Ref Range Status   01/29/2019 6.3 (H) 3.5 - 6.1 % Final               Passed - Blood pressure in last year     BP Readings from Last 1 Encounters:   05/10/19 127/80             Passed - Serum creatinine in last year     Creatinine   Date Value Ref Range Status   04/15/2019 0.78 0.70 - 1.30 mg/dL Final
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
## 2021-05-29 NOTE — TELEPHONE ENCOUNTER
Who is calling:  The patient   Reason for Call:  The patient is calling to check the status of the medication request. The patient is aware that the script is pending review. The patient would like to have his request expedited.  Date of last appointment with primary care: 4/17/2019  Okay to leave a detailed message: Yes

## 2021-05-30 ENCOUNTER — RECORDS - HEALTHEAST (OUTPATIENT)
Dept: ADMINISTRATIVE | Facility: CLINIC | Age: 61
End: 2021-05-30

## 2021-05-30 VITALS — WEIGHT: 257 LBS | BODY MASS INDEX: 37.95 KG/M2

## 2021-05-30 NOTE — PROGRESS NOTES
ASSESSMENT/PLAN:  Type 2 diabetes mellitus with ketoacidosis without coma, without long-term current use of insulin (H)  -     Glycosylated Hemoglobin A1c=5.9  Continue with metformin 2000 mg, glipizide XL 80 mg, and Actos 30 mg.    I suspect most of his concerns with sugars are related to his alcohol intake.  Alcohol cessation discussed.  He has microalbuminuria, on lisinopril  Continue with statin  Not on aspirin but on anticoagulation for DVT  Follow-up in 3 months  -     Microalbumin, Random Urine    History of DVT (deep vein thrombosis)  -     INR    Chronic Pain Syndrome (hips, knees, back, shoulders)  Wean off citalopram and start duloxetine.  Will start at 30 mg.  This is to help with his chronic pain, depression, and anxiety.  Follow-up in 1 month  He will be following with the pain clinic.  -     DULoxetine (CYMBALTA) 30 MG capsule; Take 1 capsule (30 mg total) by mouth daily.  Dispense: 30 capsule; Refill: 0    Moderate episode of recurrent major depressive disorder (H)  See management above.  Wean off citalopram and start duloxetine he would follow-up in 1 month  -     DULoxetine (CYMBALTA) 30 MG capsule; Take 1 capsule (30 mg total) by mouth daily.  Dispense: 30 capsule; Refill: 0    ETOHism (H)  He sign up for rule 25 but stated that his insurance will not pay for inpatient treatment.  He stated that he is working hard to stop drinking alcohol.  His cessation goal is July 4.  Motivational interviewing was utilized today      Orders Placed This Encounter   Procedures     Glycosylated Hemoglobin A1c     Microalbumin, Random Urine     JIC RED     INR       CHIEF COMPLAINT:  Chief Complaint   Patient presents with     Medication Refill     Tramadol      Medication Management       HISTORY OF PRESENT ILLNESS:  Brannon is a 59 y.o. male presenting to the clinic today for evaluation of diabetes mellitus and medication management.    Diabetes: The patient takes metformin twice daily. He states his at-home  blood glucose measures between 125 - 130. His A1c is 5.9 today.     Pain clinic: The patient follows with the pain clinic who manages all of his pain medications. The patient states that he has pain in his left foot.     Alcoholism: The patient expresses desire to decrease his alcohol consumption. He was recommended for in-patient treatment, but he does not want to to do this because his insurance does not cover it.     REVIEW OF SYSTEMS:   Constitutional: Negative.   HENT: Negative.   Eyes: Negative.   Respiratory: Negative.   Cardiovascular: Negative.   Gastrointestinal: Negative.   Endocrine: Negative.   Genitourinary: Negative.   Musculoskeletal: Left foot pain  Skin: Negative.   Allergic/Immunologic: Negative.   Neurological: Negative.   Hematological: Negative.   Psychiatric/Behavioral: Negative.   All other systems are negative.    PFSH:  Diabetes mellitus  Moving to [place]    TOBACCO USE:  Social History     Tobacco Use   Smoking Status Never Smoker   Smokeless Tobacco Never Used       VITALS:  Vitals:    07/01/19 1135   BP: 118/60   Pulse: 91   SpO2: 97%   Weight: (!) 267 lb (121.1 kg)     Wt Readings from Last 3 Encounters:   07/01/19 (!) 267 lb (121.1 kg)   06/20/19 (!) 270 lb (122.5 kg)   06/05/19 (!) 273 lb (123.8 kg)       PHYSICAL EXAM:  Constitutional: Patient is oriented to person, place, and time. Patient appears well-developed and well-nourished. No distress.   Head: Normocephalic and atraumatic.   Right Ear: External ear normal.   Left Ear: External ear normal.   Nose: Nose normal.   Eyes: Conjunctivae and EOM are normal. Right eye exhibits no discharge. Left eye exhibits no discharge. No scleral icterus.   Neurological: Patient is alert and oriented to person, place, and time. No cranial nerve deficit. Coordination normal.   Skin: No rash noted. Patient is not diaphoretic. No erythema. No pallor.      Results for orders placed or performed in visit on 07/01/19   Glycosylated Hemoglobin A1c    Result Value Ref Range    Hemoglobin A1c 5.9 3.5 - 6.0 %       ADDITIONAL HISTORY SUMMARIZED (2): None.  DECISION TO OBTAIN EXTRA INFORMATION (1): None.   RADIOLOGY TESTS (1): None.  LABS (1): Labs ordered today  MEDICINE TESTS (1): None.  INDEPENDENT REVIEW (2 each): None.     IViolet, am scribing for and in the presence of, Dr. Feliciano.    I, Dr. Feliciano, personally performed the services described in this documentation, as scribed by Violet Zamora in my presence, and it is both accurate and complete.    MEDICATIONS:  Current Outpatient Medications   Medication Sig Dispense Refill     ACCU-CHEK FASTCLIX USE AS DIRECTED 102 each 3     ACCU-CHEK JOSELIN Misc TEST BLOOD SUGAR EVERY DAY 1 each 0     ACCU-CHEK SMARTVIEW TEST STRIP strips USE AS DIRECTED 100 strip 3     albuterol (PROAIR HFA;PROVENTIL HFA;VENTOLIN HFA) 90 mcg/actuation inhaler Inhale 2 puffs every 4 (four) hours as needed. 1 Inhaler 3     blood sugar diagnostic (GLUCOSE BLOOD) Strp Use As Directed. Use as directed       citalopram (CELEXA) 20 MG tablet Take 1 tablet (20 mg total) by mouth daily. 90 tablet 3     CONTOUR TEST STRIPS strips Use 1 each As Directed 3 (three) times a day. 100 each 11     cyanocobalamin 1000 MCG tablet Take 1 tablet (1,000 mcg total) by mouth daily. 90 tablet 3     DULoxetine (CYMBALTA) 30 MG capsule Take 1 capsule (30 mg total) by mouth daily. 30 capsule 0     EPINEPHrine (EPIPEN) 0.3 mg/0.3 mL (1:1,000) atIn Inject 0.3 mL (0.3 mg total) into the shoulder, thigh, or buttocks as needed. 3 Device 3     [START ON 8/10/2019] ergocalciferol (VITAMIN D2) 50,000 unit capsule Take 1 capsule (50,000 Units total) by mouth every 7 days. 4 capsule 2     gabapentin (NEURONTIN) 300 MG capsule TAKE 1 CAPSULE BY MOUTH THREE TIMES DAILY AND 3 CAPUSLES AT BEDTIME 180 capsule 1     glipiZIDE (GLUCOTROL XL) 10 MG 24 hr tablet TAKE TWO TABLETS BY MOUTH DAILY  180 tablet 0     hydrOXYzine HCl (ATARAX) 25 MG tablet Take 25 mg by mouth  every 4 (four) hours as needed for itching.       LANCETS MISC Use As Directed 2 (two) times a day. Use with ros contour twice daily       lisinopril (PRINIVIL,ZESTRIL) 10 MG tablet TAKE ONE TABLET BY MOUTH ONE TIME DAILY  90 tablet 3     metFORMIN (GLUCOPHAGE) 1000 MG tablet TAKE ONE TABLET BY MOUTH TWICE DAILY  180 tablet 0     pioglitazone (ACTOS) 30 MG tablet TAKE ONE TABLET BY MOUTH ONE TIME DAILY  90 tablet 3     simvastatin (ZOCOR) 80 MG tablet Take 1 tablet (80 mg total) by mouth at bedtime. 90 tablet 3     warfarin (COUMADIN/JANTOVEN) 2.5 MG tablet Take 1-1 1/2 tablets (2.5-3.75 mg) daily as directed. Adjust dose per INR results. 135 tablet 1     No current facility-administered medications for this visit.        Total data points:1

## 2021-05-30 NOTE — TELEPHONE ENCOUNTER
"Called pt to advise of plan as outlined. He reports feeling \"frustrated\" as he feels he's doing his part, and the clinic isn't. States he'll call his PMD, but she's difficult to reach, and if he doesn't get Tramadol today he probably won't be returning to Pain Center.   "

## 2021-05-30 NOTE — PATIENT INSTRUCTIONS - HE
[facility] for [facility] for your [facility] testing.    The ordering provider will receive your final EMG results within the next few days.  Please follow up with your provider for the results and further treatment recommendations.

## 2021-05-30 NOTE — PROGRESS NOTES
[facility] Daily Progress     Patient Name: Brannon Robb  Date: 7/19/2019  Visit #: 2/12 visits per plan of care through 10/4/2019  Date of evaluation: 7/17/2019  Referral Diagnosis: lymphedema, localized R LE edema  Referring provider: Sarah Sommer, STEPH  Visit Diagnosis:     ICD-10-CM    1. Lymphedema I89.0    2. Localized edema R60.0    3. Cervical pain (neck) M54.2    4. Decreased range of motion of neck R29.898          Assessment:     Patient is benefitting from skilled physical therapy and is making steady progress toward functional goals.    Goal Status:  Pt. will demonstrate/verbalize independence in self-management of condition in : 6 weeks  Pt. will be independent with home exercise program in : 6 weeks    Pt will: improve right LE volume by 3% to demonstrate reduced swelling in 6 weeks  Pt will: demonstrate independence with compression wear in 6 weeks      Plan / Patient Education:     Continue with initial plan of care.  Assess self gradient compression bandaging, consider application of Ros[place]ahl foam roll under short stretch to optimize fibrotic softening and gradient compression, MLD, assist in ordering Juxtalite R lower leg compression if patient wishing to proceed, progress exercises, lymphedema and therapeutic neuroscience education    Subjective:     Patient stating tolerated GCB well since last session. Removed this am to shower, off x 1 Hour.  Reporting decreased pain with lifting R leg doing stretches and slept better.  Has started working with pain clinic. Patient reporting visible decrease size of R leg/ankle with GCB removal    Objective:     - to dept without compression x 1 hour  - smallest ankle = 29cm, widest calf = 58cm; length (floor to knee crease)= 45cm    Treatment Today   7/19/2019  TREATMENT MINUTES COMMENTS   Evaluation     Self-care/ Home management 15 Further instructed in and pt demonstrated self gradient compression bandaging (GCB) of R LE as per  "7/17/2019.  Instructed to launder Sat or Sun and limit time out of bandages.  iniitiated education on assessing pitting response especially L pretibial and possible option of compression wear.  May consider EdemaWear stockinette, velcro compression for R LE paired with tennis shoes during day.  Remains interested in option for compression stockings if able to afford and don/doff and with questions re;  Copperfit stockings.  Did educate in likely size limitation for R LE but may be appropriate for L LE   Manual therapy 30 Instructed in scar massage especially for tissue especially around recurrent wound.  Performed MLD trunk and R LE and L thigh   Neuromuscular Re-education 10 Initiated therapeutic neuroscience education for chronic pain and instruction in minibreak concept and issued written instructions to increase body awareness   Therapeutic Activity     Therapeutic Exercises 5 Further instructed in exs: standing heel/toe raises x 10, , supine heel slide on R x 10, danilo stretch R x 20\"; supine R knee fall out x 10; R supiine heel slides x 10   Gait training     Modality__________________                Total 60    Blank areas are intentional and mean the treatment did not include these items.       Gill Boewn  7/19/2019    "

## 2021-05-30 NOTE — TELEPHONE ENCOUNTER
FYI - Status Update  Who is Calling: Patient  Update: Patient states he talked with his PMD.  PMD states they cannot fill as they have not taken over his medications yet.  PMD told patient to get Tramadol from PCP.  This nurse informed patient he needs to sign a CSA with PCP as he hasn't signed one in a year.  Patient is questioning if he can come to the clinic today and sign the CSA.  Patient states he hasn't slept for 2 days because of the pain.  Patient states he is leaving at 4pm today.  Okay to leave a detailed message?:  Yes

## 2021-05-30 NOTE — TELEPHONE ENCOUNTER
ANTICOAGULATION  MANAGEMENT    Assessment     Today's INR result of 1.9 is Subtherapeutic (goal INR of 2.0-3.0)        Missed dose(s) may be affecting INR    No new diet changes affecting INR    No new medication/supplements affecting INR    Continues to tolerate warfarin with no reported s/s of bleeding or thromboembolism     Previous INR was Therapeutic    Plan:     Spoke with Brannon regarding INR result and instructed:     Warfarin Dosing Instructions:  Continue current warfarin dose 3.75 mg daily on ,  and ; and 2.5 mg daily rest of week  (0 % change)    Instructed patient to follow up no later than: 1-2 weeks. His stripes at home for machine are . He has been trying to contact the company with out success. He will keep trying.    Education provided: importance of therapeutic range, importance of following up for INR monitoring at instructed interval and importance of taking warfarin as instructed    Bryn verbalizes understanding and agrees to warfarin dosing plan.    Instructed to call the [facility] for any changes, questions or concerns. (#[phone])   ?   Christiana Lorenz RN    Subjective/Objective:      Brannon LEI Cezar, a 59 y.o. male is on warfarin.     Brannon reports:     Home warfarin dose: verbally confirmed home dose with Bryn and updated on anticoagulation calendar     Missed doses: Yes: yesterday     Medication changes:  No     S/S of bleeding or thromboembolism:  No     New Injury or illness:  Yes: neck pain and received injection today.     Changes in diet or alcohol consumption:  No     Upcoming surgery, procedure or cardioversion:  No    Anticoagulation Episode Summary     Current INR goal:   2.0-3.0   TTR:   75.5 % (4.3 y)   Next INR check:   2019   INR from last check:   1.90! (2019)   Weekly max warfarin dose:      Target end date:      INR check location:      Preferred lab:      Send INR reminders to:   VINAYAK Zaragoza     History of DVT (deep vein thrombosis) [Z86.718]           Comments:            Anticoagulation Care Providers     Provider Role Specialty Phone number    Gilberto Arnold MD Referring Family Medicine [phone]

## 2021-05-30 NOTE — TELEPHONE ENCOUNTER
Refill Approved    Rx renewed per Medication Renewal Policy. Medication was last renewed on 7/1/19.    Sherita Gibson, [facility] Triage/Med Refill 7/30/2019     Requested Prescriptions   Pending Prescriptions Disp Refills     DULoxetine (CYMBALTA) 30 MG capsule [Pharmacy Med Name: DULoxetine HCl Oral Capsule Delayed Release Particles 30 MG] 30 capsule 0     Sig: TAKE ONE CAPSULE BY MOUTH ONE TIME DAILY       Tricyclics/Misc Antidepressant/Antianxiety Meds Refill Protocol Passed - 7/30/2019  2:27 PM        Passed - PCP or prescribing provider visit in last year     Last office visit with prescriber/PCP: 7/1/2019 Gilberto Arnold MD OR same dept: 7/1/2019 Gilberto Arnold MD OR same specialty: 7/1/2019 Gilberto Arnold MD  Last physical: 1/29/2019 Last MTM visit: Visit date not found   Next visit within 3 mo: Visit date not found  Next physical within 3 mo: Visit date not found  Prescriber OR PCP: Gilberto Bravo MD  Last diagnosis associated with med order: 1. Chronic Pain Syndrome (hips, knees, back, shoulders)  - DULoxetine (CYMBALTA) 30 MG capsule [Pharmacy Med Name: DULoxetine HCl Oral Capsule Delayed Release Particles 30 MG]; TAKE ONE CAPSULE BY MOUTH ONE TIME DAILY   Dispense: 30 capsule; Refill: 0    2. Moderate episode of recurrent major depressive disorder (H)  - DULoxetine (CYMBALTA) 30 MG capsule [Pharmacy Med Name: DULoxetine HCl Oral Capsule Delayed Release Particles 30 MG]; TAKE ONE CAPSULE BY MOUTH ONE TIME DAILY   Dispense: 30 capsule; Refill: 0    If protocol passes may refill for 12 months if within 3 months of last provider visit (or a total of 15 months).

## 2021-05-30 NOTE — TELEPHONE ENCOUNTER
ANTICOAGULATION MANAGEMENT PROGRAM--FINAL REMINDER NOTIFICATION     Dr. Feliciano,    Your patient, Brannon Robb, has been under the care of the [facility] for warfarin management. Our records indicate that Brannon is either past due for an INR check or has not followed clinic staff recommendations. The patient s last INR was 2.6 on 5/7/2019.      Brannon received two phone calls and one letter over the last several weeks in attempt to arrange a follow up appointment.  Brannon is being sent a final reminder letter today to attempt to arrange a follow up appointment.      The [facility] is unable to manage anticoagulation therapy for non-compliant patients. If Brannon does not schedule follow up within 3 weeks from the date of his letter it may result in transition of Brannon from our service back to you for warfarin management.    Please don t hesitate to contact the Anticoagulation Management Program at [phone] if you have any questions or concerns.     Sincerely,     [facility]

## 2021-05-30 NOTE — TELEPHONE ENCOUNTER
Tramadol (Ultram) was discontinued by Dr. Feliciano on 6/20/19. Patient has a med check appointment with Dr. Feliciano on Monday 7/1/19. Patient is seen at pain clinic.    Kira SPARROW CMA ([facility])
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
## 2021-05-30 NOTE — TELEPHONE ENCOUNTER
[facility] called and stated patient is extremely claustrophobic and only can complete an MRI while seated. Sandra who spoke w Dr. Tucker and approved a seated MRI. Relayed info to [facility].

## 2021-05-30 NOTE — PATIENT INSTRUCTIONS - HE
Sprain injury of the left wrist, with point tenderness over the distal ulna, about 3 cm proximal to the ulnar styloid.  The pain does not seem to involve the wrist joint per se.  He has tenderness when I press over his distal ulna.  The mechanism of injury does not seem to be violent enough to have caused fracture.  I do not see ecchymosis to suggest bleeding from fracture.  He does get pain in that area when he supinates his left forearm.    Today, let us get left wrist x-rays, with attention to the distal ulna.  I told him I would call him tomorrow with the result.  If the x-ray is normal, I think we would attribute his pain to a sprain injury, which should resolve itself over the next week or 2 with rest.  He already has a neoprene splint that he is been applying to his left wrist which he finds helpful.  I told him to keep using that for the next couple of days, until his wrist is feeling better and he thinks he can move it more.    Since we are only about 12 hours into his injury, I told him that he can apply ice to his wrist which will help with pain and inflammation.    He told me that his blood sugars have been quite good, running about 120.  He told me he is been taking all of his medications as directed.

## 2021-05-30 NOTE — TELEPHONE ENCOUNTER
RN cannot approve Refill Request    RN can NOT refill this medication medication not on med list. Last office visit: 4/17/2019 Gilberto Arnold MD Last Physical: 1/29/2019 Last MTM visit: Visit date not found Last visit same specialty: 6/5/2019 Jason Palacios MD.  Next visit within 3 mo: Visit date not found  Next physical within 3 mo: Visit date not found      Anupama Villeda, [facility] Triage/Med Refill 6/26/2019    Requested Prescriptions   Pending Prescriptions Disp Refills     traMADol (ULTRAM) 50 mg tablet [Pharmacy Med Name: traMADol HCl Oral Tablet 50 MG] 60 tablet 0     Sig: TAKE ONE TABLET BY MOUTH TWICE A DAY AS NEEDED FOR PAIN       Controlled Substances Refill Protocol Failed - 6/26/2019 10:10 AM        Failed - Patient has controlled substance agreement in past 12 months     Encounter-Level CSA Scan Date:    There are no encounter-level csa scan date.               Passed - Route all Controlled Substance Requests to Provider        Passed - Visit with PCP or prescribing provider visit in past 12 months      Last office visit with prescriber/PCP: 4/17/2019 Gilberto Arnold MD OR same dept: 6/5/2019 Jason Palacios MD OR same specialty: 6/5/2019 Jason Palacios MD Last physical: 1/29/2019 Last MTM visit: Visit date not found    Next visit within 3 mo: Visit date not found  Next physical within 3 mo: Visit date not found  Prescriber OR PCP: Gilberto Bravo MD  Last diagnosis associated with med order: 1. Polyarthralgia  - traMADol (ULTRAM) 50 mg tablet [Pharmacy Med Name: traMADol HCl Oral Tablet 50 MG]; TAKE ONE TABLET BY MOUTH TWICE A DAY AS NEEDED FOR PAIN   Dispense: 60 tablet; Refill: 0

## 2021-05-30 NOTE — PATIENT INSTRUCTIONS - HE
DISCHARGE INSTRUCTIONS    During office hours (8:00 a.m.- 4:30 p.m.) questions or concerns may be answered  by calling Spine Navigation Nurses at  [phone].     If you experience any problems after hours  please call [phone] and you will be connected to [facility].     All Patients:    ? You may experience an increase in your symptoms for the first 2 days (It may take anywhere between 2 days- 2 weeks for the steroid to have maximum effect).    ? You may use ice on the injection site, as frequently as 20 minutes each hour if needed.    ? You may take your pain medicine.    ? You may continue taking your regular medication after your injection. If you have had a Medial Branch Block you may resume pain medication once your pain diary is completed.    ? You may shower. No swimming, tub bath or hot tub for 48 hours.  You may remove your bandaid/bandage as soon as you are home.    ? You may resume light activities, as tolerated.    ? Resume your usual diet as tolerated.    ? It is strongly advised that you do not drive for 1-3 hours post injection.    ? If you have had oral sedation:  Do not drive for 8 hours post injection.      ? If you have had IV sedation:  Do not drive for 24 hours post injection.  Do not operate hazardous machinery or make important personal/business decisions for 24 hours.      POSSIBLE STEROID SIDE EFFECTS (If steroid/cortisone was used for your procedure)    -If you experience these symptoms, it should only last for a short period      Swelling of the legs                Skin redness (flushing)       Mouth (oral) irritation     Blood sugar (glucose) levels              Sweats                      Mood changes    Headache    Sleeplessness         POSSIBLE PROCEDURE SIDE EFFECTS  -Call the Spine Center if you are concerned    Increased Pain             Increased numbness/tingling        Nausea/Vomiting            Bruising/bleeding at site        Redness or swelling                                                 Difficulty walking        Weakness             Fever greater than 100.5    *In the event of a severe headache after an epidural steroid injection that is relieved by lying down, please call the [facility] to speak with a clinical staff member*

## 2021-05-30 NOTE — PATIENT INSTRUCTIONS - HE
X-Ray with flexion and extension today to check for dynamic movement  Injection  Follow up in 8 weeks with Dr. Tucker

## 2021-05-30 NOTE — TELEPHONE ENCOUNTER
Controlled Substance Refill Request  Medication:   Requested Prescriptions     Pending Prescriptions Disp Refills     traMADol (ULTRAM) 50 mg tablet [Pharmacy Med Name: traMADol HCl Oral Tablet 50 MG] 60 tablet 0     Sig: TAKE ONE TABLET BY MOUTH TWICE DAILY AS NEEDED FOR PAIN     Date Last Fill: NOT ON MED LIST  Pharmacy: [facility]   Submit electronically to pharmacy  Controlled Substance Agreement on File:   Encounter-Level CSA Scan Date:    There are no encounter-level csa scan date.       Last office visit: Last office visit pertaining to requested medication was 4.17.19.

## 2021-05-30 NOTE — TELEPHONE ENCOUNTER
Initiated PT order and this is scheduled for 7/17/19 he has a lot of lymphedema and I am concerned this is leading to a bit of his pain. 6/20/19 gabapentin and vitamin D were prescribed. I understand he has not felt as though there is much being done for him.    He has been making an effort to work with behavioral health as it related to chemical dependency.Saw Cici and had a Rule 25    I will bring this to case review July 11,2019 as opioid medication and the chemical dependency issues are a concern.    Will include Dr. Telles and Cici Taylor in on this situation given the pt concerns.

## 2021-05-30 NOTE — TELEPHONE ENCOUNTER
Pain clinic is his clinic for pain management.  Please forward to pain clinic to determine if refill is appropriate

## 2021-05-30 NOTE — PROGRESS NOTES
Brannon Robb presents today for a follow up after a recent Cervical MRI on 6/28/2019. He c/o mild pain in left posterior neck that goes down left shoulder. He recently had surgery on his shoulder and is unsure if this pain is due to the neck or the shoulder. He also c/o Numbness and tingling in the middle finger, right finger and pinky finger of left hand. He denies any weakness, and bowel/bladder changes.   NDI Score: 58%  Milton Shabazz LPN

## 2021-05-30 NOTE — PROGRESS NOTES
Patient is here for a follow up with Ginette Sommer CNP for his back, hip and shoulder pain           *Universal Precautions:   UDT/Swab-  2/28/2019   Consent- if prescribing opioids  Agreement- if prescribing opioids  Pharmacy- as documented   Count- n/a  Psychological evaluation - information provided  Pharmacogenetic testing- n/a  MME- n/a  MTM - consider  Naloxone safety: n/a

## 2021-05-30 NOTE — PATIENT INSTRUCTIONS - HE
Plan:   Plan of Care / NextSteps:     Follow up by: 1 month      Education:   Please call Monday-Friday for problems or questions and one of the clinical support staff (CSS) will help to get things figured out. The number is [phone].     [employer] is a means to send an e-mail (Adapt message) to communicate any concerns.     Please remember some issues require an office visit.     Today we reviewed the plan of care and answered questions.      Records: Reviewed to assist with preparation for the office visit and are reflected throughout the note.    Primary Care: Talk with your primary care doctor to see if you can connect with a . A barrier to you healthcare is b/c of you financials.     Behavioral Medicine: We discussed the Care Review approach which is for you to follow through with the rule 25 and then we can consider buprenorphine products. We have issues with deaths from the opioid epidemic.    Rehabilitation: Once you get the velcro wrap you are planning to get back to PT. You are continuing to wrap the leg. Looking to see if getting the fluid out of the leg will help with some of the pain symptoms. There is a lot of weight in that leg    Diagnostics:   I am ordering an MRI of the right hip and right ankle open- [facility]      Medication prescribed / to be continued: gabapentin, vitamin D  Medication prescribed today:    Requested Prescriptions     Signed Prescriptions Disp Refills     gabapentin (NEURONTIN) 300 MG capsule 180 capsule 1     Sig: TAKE 1 CAPSULE BY MOUTH THREE TIMES DAILY AND 3 CAPUSLES AT BEDTIME

## 2021-05-30 NOTE — TELEPHONE ENCOUNTER
Triage call:   Stumbled this morning and hurt his left wrist. Patient put his arm out to brace himself against the wall when he stubled. Did not hear any snaps or pops when he tripped. Reports he did break that wrist about one year ago. Able to move wrist but painful, no swelling. Has been icing. 7/10 pain, lump on the wrist but unsure if it was there before. Would like to come in and have a doctor look at it.     Triaged to be seen today - reviewed additional care advice with patient and he verbalizes understanding. Patient warm transferred to scheduling for appointment. Appointment @ 2:40 pm today with Dr Marshall.     Brooke Schaffer RN BSBA [facility] Triage/Med Refill 7/23/2019 11:05 AM     Reason for Disposition    Patient wants to be seen    Protocols used: HAND AND WRIST INJURY-A-OH
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
## 2021-05-30 NOTE — TELEPHONE ENCOUNTER
Patient left message, has been waiting for 12 days for Tramadol. Been out for 10 days.claims primary Dr has sent message to us to fill the Tramadol. Going to be leaving town, and would like a refill. Send to [facility].     Will review if we are ordering Tramadol. Per last OV, was to do Rule 25

## 2021-05-30 NOTE — TELEPHONE ENCOUNTER
Situation:  Pt is interested in tramadol. Pain plan of care reviewed.     Review of chemical dependency - DA with Cici and Rule 25.    Plan:  Pt needs to follow through with the Rule 25 recommendations at a later date consider buprenorphine if appropriate.

## 2021-05-30 NOTE — PROGRESS NOTES
Patient presents at the request of Dr. Tucker for bilateral upper extremity he has a history of bilateral hand numbness and tingling digits 3-5 worse at night.  Wakes up with his fingers numb and tingling.  Left is worse than the right.  The right is only been present for the past 1 to 2 months.  History of carpal tunnel release bilaterally approximately 35 years ago.    EMG/NCS  results: Please see scanned full report.    Comment NCS: Abnormal study:    1.  Mildly low amplitude bilateral ulnar SNAPs.    2.  Normal bilateral median SNAPs.  3.  Normal bilateral median and ulnar transcarpal studies and transcarpal latency comparisons.  4.  Low amplitude bilateral median CMAPs with normal conduction velocities.  With normal latencies and conduction velocities, this finding is of unknown significance.    5.  Normal distal latency of the left ulnar CMAP with amplitude drop across the forearm.  Mild slowing across the elbow of approximately 10%.  6.  Left Ulnar motor inching across the elbow recording at ADM: No Focal slowing or amplitude drop across the elbow.  7.  Normal latency and amplitude of the left ulnar CMAP with normal long segment conduction velocity and mildly slowed conduction velocity across the forearm.    Comment EMG: Normal study.  1.  Normal needle EMG bilateral upper extremities..    Interpretation: Abnormal study: There is electrodiagnostic evidence of:    1.  Left ulnar neuropathy, non-localizable, very mild.  There is some minimal slowing of the conduction velocity across the elbow and this likely represents a mild ulnar neuropathy at the elbow but  I am unable to confirm based on today's study.    2.  There is no electrodiagnostic evidence of ulnar neuropathy in the right upper extremity.      3. There is no electrodiagnostic evidence of cervical radiculopathy, brachial plexopathy, or median neuropathy in the bilateral upper extremities.    The testing was completed in its entirety by the  physician.      It was our pleasure caring for your patient today, if there any questions or concerns please do not hesitate to contact us.

## 2021-05-30 NOTE — PROGRESS NOTES
[facility] Discharge Summary    Patient Name: Brannon Robb  Date: 9/18/2019  Referral Diagnosis: LE Swelling  Referring provider: Gilberto Arnold      Patient was seen for 3 visits in PT.  See most recent PT note for updated status.     Goals Below were not assessed d/t patient not returning for further PT:  Pt. will demonstrate/verbalize independence in self-management of condition in : 6 weeks  Pt. will be independent with home exercise program in : 6 weeks    Pt will: improve right LE volume by 3% to demonstrate reduced swelling in 6 weeks  Pt will: demonstrate independence with compression wear in 6 weeks      Physical Therapy will be discharged at this time.    Thank you for your referral.  April Mckeon, DPT, CLT  9/18/2019      [facility] Daily Progress     Patient Name: Brannon Robb  Date: 7/24/2019  Visit #: 3/12 visits per plan of care through 10/4/2019  Date of evaluation: 7/17/2019  Referral Diagnosis: lymphedema, localized R LE edema  Referring provider: Sarah Sommer PAC  Visit Diagnosis:     ICD-10-CM    1. Lymphedema I89.0    2. Localized edema R60.0          Assessment:     Patient is benefitting from skilled physical therapy and is making steady progress toward functional goals.    Goal Status:  Pt. will demonstrate/verbalize independence in self-management of condition in : 6 weeks  Pt. will be independent with home exercise program in : 6 weeks    Pt will: improve right LE volume by 3% to demonstrate reduced swelling in 6 weeks  Pt will: demonstrate independence with compression wear in 6 weeks      Plan / Patient Education:     Continue with initial plan of care.  Assess self gradient compression bandaging, assess application of [place] foam roll under short stretch to optimize fibrotic softening and gradient compression, MLD, check if patient received Juxtalites from [facility]. lymphedema and therapeutic neuroscience education    Subjective:  "    Able to keep the wrap on for the most part, was able to wrap himself except for yesterday d/t hurting his wrist.   Pain in right le-6/10  Right hip seems to be getting better  Patient reporting visible decrease size of R leg/ankle with GCB removal    Objective:       - to dept without compression x1 day. Unable to wrap d/t injury to left wrist yesterday - hand caught himself on the wall when he tripped in the middle of the night on a dog bone.     - smallest ankle = 29cm, widest calf = 58cm; length (floor to knee crease)= 45cm    Treatment Today   2019   TREATMENT MINUTES COMMENTS   Evaluation     Self-care/ Home management 15 Further instructed in and pt demonstrated self gradient compression bandaging (GCB) of R LE as per 2019.     Wrapped patient as follows:  Non elasticized stockinette foot to knee  [place] ankle to knee  [id number] foot to ankle  [id number] ankle to knee  [id number] foot to knee, harring bone    Discussed velcro compression and PT sent to [facility] patient's measurements today.    Manual therapy 30 Performed MLD trunk, R LE   Neuromuscular Re-education 10 Continued with therapeutic neuroscience education for chronic pain.  Continued with mirror therapy retraining exercise to work on at home 10 minutes up to 3 times per day if able.    Therapeutic Activity     Therapeutic Exercises  Further instructed in exs: standing heel/toe raises x 10, , supine heel slide on R x 10, danilo stretch R x 20\"; supine R knee fall out x 10; R supiine heel slides x 10   Gait training     Modality__________________                Total 55    Blank areas are intentional and mean the treatment did not include these items.       April Mckeon, PT, CLT  2019  "

## 2021-05-30 NOTE — TELEPHONE ENCOUNTER
----- Message from Gilberto Bravo MD sent at 6/27/2019 12:52 PM CDT -----  Regarding: FW: question about antidepressant  Please ask the patient to come in to discuss this.  Thank you  ----- Message -----  From: Sarah Sommer CNP  Sent: 6/20/2019  11:31 AM  To: Gilberto Bravo MD  Subject: question about antidepressant                    Do you feel strongly about his citalopram? I am wondering if we could work with a different antidepressant that may offer more pain benefits. Either a SNRI or a TCA. How would you feel about this change - he is on coumadin and I was not certain if you were working with specific medication due to this medication?    Thank you  Ginette

## 2021-05-30 NOTE — TELEPHONE ENCOUNTER
"Spoke to Bryn.  He is in a lot of pain, especially leg pain.  He is feeling frustrated that his pain is not being addressed.  He stated gabapentin and vitamin D are not helping with his pain.  PT was ordered by his primary pain provider but will not start until 2 weeks from now.  Recent note from primary pain provider indicated bringing his case \"for review July 11,2019\" but no mentioned of a plan for his current pain.  Tramadol has worked better for his pain in the past.  I will refill tramadol (1 tab two times a day, #60/month) to bridge him until he is seen again at the pain clinic.     "

## 2021-05-31 ENCOUNTER — RECORDS - HEALTHEAST (OUTPATIENT)
Dept: ADMINISTRATIVE | Facility: CLINIC | Age: 61
End: 2021-05-31

## 2021-05-31 VITALS — BODY MASS INDEX: 37.7 KG/M2 | WEIGHT: 255.31 LBS

## 2021-05-31 VITALS — WEIGHT: 263 LBS | BODY MASS INDEX: 38.84 KG/M2

## 2021-05-31 NOTE — TELEPHONE ENCOUNTER
ANTICOAGULATION  MANAGEMENT PROGRAM    Brannon Robb is overdue for INR check.  Reminder call made.    Spoke with Brannon and scheduled INR appointment on 8/15 .    Vivian Diaz RN

## 2021-05-31 NOTE — TELEPHONE ENCOUNTER
Refill Approved    Rx renewed per Medication Renewal Policy. Medication was last renewed on 8/22/18.    Sherita Gibson, [facility] Triage/Med Refill 8/27/2019     Requested Prescriptions   Pending Prescriptions Disp Refills     warfarin (JANTOVEN) 2.5 MG tablet [Pharmacy Med Name: Jantoven Oral Tablet 2.5 MG] 135 tablet 0     Sig: TAKE 1 TO 1.5 TABLETS BY MOUTH DAILY AS DIRECTED PER INR RESULTS       Warfarin Refill Protocol  Failed - 8/27/2019  2:22 PM        Failed -  Route to appropriate pool/provider     Last Anticoagulation Summary:   Anticoagulation Episode Summary     Current INR goal:   2.0-3.0   TTR:   75.3 % (4.4 y)   Next INR check:   9/12/2019   INR from last check:   2.20 (8/15/2019)   Weekly max warfarin dose:      Target end date:      INR check location:      Preferred lab:      Send INR reminders to:   VINAYAK LEONARDO    Indications    History of DVT (deep vein thrombosis) [Z86.718]           Comments:            Anticoagulation Care Providers     Provider Role Specialty Phone number    Gilberto Arnold MD Referring Family Medicine [phone]                Passed - Provider visit in last year     Last office visit with prescriber/PCP: 7/1/2019 Gilberto Arnold MD OR same dept: 7/1/2019 Gilberto Arnold MD OR same specialty: 7/1/2019 Gilberto Arnold MD  Last physical: 1/29/2019 Last MTM visit: Visit date not found    Next appt within 3 mo: Visit date not found Next physical within 3 mo: Visit date not found  Prescriber OR PCP: Gilberto Bravo MD  Last diagnosis associated with med order: 1. History of DVT (deep vein thrombosis)  - JANTOVEN 2.5 mg tablet [Pharmacy Med Name: Jantoven Oral Tablet 2.5 MG]; TAKE 1 TO 1.5 TABLETS BY MOUTH DAILY AS DIRECTED PER INR RESULTS  Dispense: 135 tablet; Refill: 0    2. Hypertension  - lisinopril (PRINIVIL,ZESTRIL) 10 MG tablet [Pharmacy Med Name: Lisinopril Oral Tablet 10 MG]; TAKE ONE  TABLET BY MOUTH ONE TIME DAILY   Dispense: 90 tablet; Refill: 2    If protocol passes may refill for 6 months if within 3 months of last provider visit (or a total of 9 months).          lisinopril (PRINIVIL,ZESTRIL) 10 MG tablet [Pharmacy Med Name: Lisinopril Oral Tablet 10 MG] 90 tablet 2     Sig: TAKE ONE TABLET BY MOUTH ONE TIME DAILY       [employer] Refill Protocol Passed - 8/27/2019  2:22 PM        Passed - PCP or prescribing provider visit in past 12 months       Last office visit with prescriber/PCP: 7/1/2019 Gilberto Arnold MD OR same dept: 7/1/2019 Gilberto Arnold MD OR same specialty: 7/1/2019 Gilberto Arnold MD  Last physical: 1/29/2019 Last MTM visit: Visit date not found   Next visit within 3 mo: Visit date not found  Next physical within 3 mo: Visit date not found  Prescriber OR PCP: Gilberto Bravo MD  Last diagnosis associated with med order: 1. History of DVT (deep vein thrombosis)  - JANTOVEN 2.5 mg tablet [Pharmacy Med Name: Jantoven Oral Tablet 2.5 MG]; TAKE 1 TO 1.5 TABLETS BY MOUTH DAILY AS DIRECTED PER INR RESULTS  Dispense: 135 tablet; Refill: 0    2. Hypertension  - lisinopril (PRINIVIL,ZESTRIL) 10 MG tablet [Pharmacy Med Name: Lisinopril Oral Tablet 10 MG]; TAKE ONE TABLET BY MOUTH ONE TIME DAILY   Dispense: 90 tablet; Refill: 2    If protocol passes may refill for 12 months if within 3 months of last provider visit (or a total of 15 months).             Passed - Serum Potassium in past 12 months     Lab Results   Component Value Date    Potassium 4.3 04/15/2019             Passed - Blood pressure filed in past 12 months     BP Readings from Last 1 Encounters:   07/31/19 120/60             Passed - Serum Creatinine in past 12 months     Creatinine   Date Value Ref Range Status   04/15/2019 0.78 0.70 - 1.30 mg/dL Final

## 2021-05-31 NOTE — TELEPHONE ENCOUNTER
Refill Approved    Rx renewed per Medication Renewal Policy. Medication was last renewed on 6/5/19.    Sherita Gisbon, [facility] Connection Triage/Med Refill 9/3/2019     Requested Prescriptions   Pending Prescriptions Disp Refills     glipiZIDE (GLUCOTROL XL) 10 MG 24 hr tablet [Pharmacy Med Name: glipiZIDE ER Oral Tablet Extended Release 24 Hour 10 MG] 180 tablet 0     Sig: TAKE TWO TABLETS BY MOUTH DAILY       Oral Hypoglycemics Refill Protocol Passed - 9/3/2019 10:43 AM        Passed - Visit with PCP or prescribing provider visit in last 6 months       Last office visit with prescriber/PCP: Visit date not found OR same dept: 7/1/2019 Gilberto Arnold MD OR same specialty: 7/1/2019 Gilberto Arnold MD Last physical: Visit date not found Last MTM visit: Visit date not found         Next appt within 3 mo: Visit date not found  Next physical within 3 mo: Visit date not found  Prescriber OR PCP: Itzel Mata MD  Last diagnosis associated with med order: 1. Diabetes mellitus (H)  - glipiZIDE (GLUCOTROL XL) 10 MG 24 hr tablet [Pharmacy Med Name: glipiZIDE ER Oral Tablet Extended Release 24 Hour 10 MG]; TAKE TWO TABLETS BY MOUTH DAILY   Dispense: 180 tablet; Refill: 0  - metFORMIN (GLUCOPHAGE) 1000 MG tablet [Pharmacy Med Name: metFORMIN HCl Oral Tablet 1000 MG]; TAKE ONE TABLET BY MOUTH TWICE DAILY   Dispense: 180 tablet; Refill: 0    2. Type 2 diabetes mellitus with complication, without long-term current use of insulin (H)  - metFORMIN (GLUCOPHAGE) 1000 MG tablet [Pharmacy Med Name: metFORMIN HCl Oral Tablet 1000 MG]; TAKE ONE TABLET BY MOUTH TWICE DAILY   Dispense: 180 tablet; Refill: 0     If protocol passes may refill for 12 months if within 3 months of last provider visit (or a total of 15 months).           Passed - A1C in last 6 months     Hemoglobin A1c   Date Value Ref Range Status   07/01/2019 5.9 3.5 - 6.0 % Final               Passed - Microalbumin in last year      Microalbumin, Random  Urine   Date Value Ref Range Status   07/01/2019 9.41 (H) 0.00 - 1.99 mg/dL Final                  Passed - Blood pressure in last year     BP Readings from Last 1 Encounters:   09/03/19 137/82             Passed - Serum creatinine in last year     Creatinine   Date Value Ref Range Status   04/15/2019 0.78 0.70 - 1.30 mg/dL Final             metFORMIN (GLUCOPHAGE) 1000 MG tablet [Pharmacy Med Name: metFORMIN HCl Oral Tablet 1000 MG] 180 tablet 0     Sig: TAKE ONE TABLET BY MOUTH TWICE DAILY       Metformin Refill Protocol Passed - 9/3/2019 10:43 AM        Passed - Blood pressure in last 12 months     BP Readings from Last 1 Encounters:   09/03/19 137/82             Passed - LFT or AST or ALT in last 12 months     Albumin   Date Value Ref Range Status   04/15/2019 4.0 3.5 - 5.0 g/dL Final     Bilirubin, Total   Date Value Ref Range Status   04/15/2019 0.4 0.0 - 1.0 mg/dL Final     Bilirubin, Direct   Date Value Ref Range Status   03/04/2015 0.2 <=0.5 mg/dL Final     Alkaline Phosphatase   Date Value Ref Range Status   04/15/2019 61 45 - 120 U/L Final     AST   Date Value Ref Range Status   04/15/2019 22 0 - 40 U/L Final     ALT   Date Value Ref Range Status   04/15/2019 15 0 - 45 U/L Final     Protein, Total   Date Value Ref Range Status   04/15/2019 7.7 6.0 - 8.0 g/dL Final                Passed - GFR or Serum Creatinine in last 6 months     GFR MDRD Non Af Amer   Date Value Ref Range Status   04/15/2019 >60 >60 mL/min/1.73m2 Final     GFR MDRD Af Amer   Date Value Ref Range Status   04/15/2019 >60 >60 mL/min/1.73m2 Final             Passed - Visit with PCP or prescribing provider visit in last 6 months or next 3 months     Last office visit with prescriber/PCP: Visit date not found OR same dept: 7/1/2019 Gilberto Arnold MD OR same specialty: 7/1/2019 Gilberto Arnold MD Last physical: Visit date not found Last MTM visit: Visit date not found         Next appt within 3 mo: Visit date not  found  Next physical within 3 mo: Visit date not found  Prescriber OR PCP: Itzel Mata MD  Last diagnosis associated with med order: 1. Diabetes mellitus (H)  - glipiZIDE (GLUCOTROL XL) 10 MG 24 hr tablet [Pharmacy Med Name: glipiZIDE ER Oral Tablet Extended Release 24 Hour 10 MG]; TAKE TWO TABLETS BY MOUTH DAILY   Dispense: 180 tablet; Refill: 0  - metFORMIN (GLUCOPHAGE) 1000 MG tablet [Pharmacy Med Name: metFORMIN HCl Oral Tablet 1000 MG]; TAKE ONE TABLET BY MOUTH TWICE DAILY   Dispense: 180 tablet; Refill: 0    2. Type 2 diabetes mellitus with complication, without long-term current use of insulin (H)  - metFORMIN (GLUCOPHAGE) 1000 MG tablet [Pharmacy Med Name: metFORMIN HCl Oral Tablet 1000 MG]; TAKE ONE TABLET BY MOUTH TWICE DAILY   Dispense: 180 tablet; Refill: 0     If protocol passes may refill for 12 months if within 3 months of last provider visit (or a total of 15 months).           Passed - A1C in last 6 months     Hemoglobin A1c   Date Value Ref Range Status   07/01/2019 5.9 3.5 - 6.0 % Final               Passed - Microalbumin in last year      Microalbumin, Random Urine   Date Value Ref Range Status   07/01/2019 9.41 (H) 0.00 - 1.99 mg/dL Final

## 2021-05-31 NOTE — TELEPHONE ENCOUNTER
ANTICOAGULATION  MANAGEMENT    Assessment     Today's INR result of 2.2 is Therapeutic (goal INR of 2.0-3.0)        Warfarin taken as previously instructed    No new diet changes affecting INR    No new medication/supplements affecting INR    Continues to tolerate warfarin with no reported s/s of bleeding or thromboembolism     Previous INR was Subtherapeutic    Plan:     Spoke with Brannon regarding INR result and instructed:     Warfarin Dosing Instructions:  Continue current warfarin dose 3.75 mg daily on Mondays, Wednesdays and Fridays; and 2.5 mg daily rest of week  (0 % change)    Instructed patient to follow up no later than: 2-4 weeks pending if get new strips for home monitor.    Education provided: importance of therapeutic range, importance of following up for INR monitoring at instructed interval and importance of taking warfarin as instructed    Bryn verbalizes understanding and agrees to warfarin dosing plan.    Instructed to call the [facility] for any changes, questions or concerns. (#[phone])   ?   Christiana Lorenz RN    Subjective/Objective:      Brannon LEI Cezar, a 59 y.o. male is on warfarin.     Brannon reports:     Home warfarin dose: verbally confirmed home dose with Bryn and updated on anticoagulation calendar     Missed doses: No     Medication changes:  No     S/S of bleeding or thromboembolism:  No     New Injury or illness:  No     Changes in diet or alcohol consumption:  No     Upcoming surgery, procedure or cardioversion:  No    Anticoagulation Episode Summary     Current INR goal:   2.0-3.0   TTR:   75.3 % (4.4 y)   Next INR check:   9/12/2019   INR from last check:   2.20 (8/15/2019)   Weekly max warfarin dose:      Target end date:      INR check location:      Preferred lab:      Send INR reminders to:   VINAYAK LEONARDO    Indications    History of DVT (deep vein thrombosis) [Z86.718]           Comments:            Anticoagulation Care Providers     Provider Role  Specialty Phone number    Gilberto Arnold MD Referring Family Medicine [phone]

## 2021-05-31 NOTE — PROGRESS NOTES
Universal Precautions:   UDT/Swab-  2/28/2019   Consent- if prescribing opioids  Agreement- if prescribing opioids  Pharmacy- as documented   Count- n/a  Psychological evaluation - information provided  Pharmacogenetic testing- n/a  MME- n/a  MTM - consider  Naloxone safety: n/a
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
## 2021-05-31 NOTE — TELEPHONE ENCOUNTER
Situation:  Pt with hx of alcohol and THC evaluated and was recommended to seek out a Rule 25 the Rule 25 recommended inpt treatment. Pt indicates his insurance would not cover and he would not be able to afford.    Plan:  There has not been a situation where in patient has not been covered according to the discussion. There may be other approaches to the financial pt can be redirected [facility] [place]    TC placed to the pt and left a V.M. to make him aware we have not run into in patient care not being covered. He may reach out to the pain center or the person he saw for direction.

## 2021-05-31 NOTE — PATIENT INSTRUCTIONS - HE
Plan:   Plan of Care / NextSteps:     Follow up by: 6-8 weeks      Education:   Please call Monday-Friday for problems or questions and one of the clinical support staff (CSS) will help to get things figured out. The number is [phone].     [facility] is a means to send an e-mail ([employer] / [employer] message) to communicate any concerns.     Please remember some issues require an office visit.     Today we reviewed the plan of care and answered questions.      Records: Reviewed to assist with preparation for the office visit and are reflected throughout the note.    Primary Care: See your primary care doctor about the pelvic lymph nodes    Behavioral Medicine: We discussed you can connect with Selin VERA she did the Rule 25 about the in-patient program as your understanding is it was not covered and would not be affordable for you.     Rehabilitation: You are intending to  the velcro compression sock     Consultation/Specialists: I would like you to see [facility] for the right hip    Diagnostics:   We reviewed the Hip MRI today from [facility].       Medication prescribed / to be continued: gabapentin, vitamin D  Medication prescribed today:    Requested Prescriptions     Signed Prescriptions Disp Refills     gabapentin (NEURONTIN) 300 MG capsule 180 capsule 1     Sig: TAKE 1 CAPSULE BY MOUTH THREE TIMES DAILY AND 3 CAPUSLES AT BEDTIME

## 2021-06-01 ENCOUNTER — RECORDS - HEALTHEAST (OUTPATIENT)
Dept: ADMINISTRATIVE | Facility: CLINIC | Age: 61
End: 2021-06-01

## 2021-06-01 VITALS — BODY MASS INDEX: 38.4 KG/M2 | WEIGHT: 260 LBS

## 2021-06-01 VITALS — BODY MASS INDEX: 38.99 KG/M2 | WEIGHT: 264 LBS

## 2021-06-01 VITALS — BODY MASS INDEX: 39.28 KG/M2 | WEIGHT: 266 LBS

## 2021-06-01 VITALS — BODY MASS INDEX: 38.66 KG/M2 | WEIGHT: 261 LBS | HEIGHT: 69 IN

## 2021-06-01 VITALS — BODY MASS INDEX: 38.87 KG/M2 | WEIGHT: 263.2 LBS

## 2021-06-01 VITALS — WEIGHT: 264 LBS | BODY MASS INDEX: 38.99 KG/M2

## 2021-06-01 VITALS — WEIGHT: 264 LBS | BODY MASS INDEX: 39.1 KG/M2 | HEIGHT: 69 IN

## 2021-06-01 VITALS — BODY MASS INDEX: 38.79 KG/M2 | WEIGHT: 261.9 LBS | HEIGHT: 69 IN

## 2021-06-01 VITALS — BODY MASS INDEX: 38.51 KG/M2 | WEIGHT: 260 LBS | HEIGHT: 69 IN

## 2021-06-01 VITALS — BODY MASS INDEX: 38.95 KG/M2 | HEIGHT: 69 IN | WEIGHT: 263 LBS

## 2021-06-01 VITALS — BODY MASS INDEX: 36.92 KG/M2 | WEIGHT: 250 LBS

## 2021-06-01 NOTE — PROGRESS NOTES
Discharge Summary        Dates of service 4/9/2019   to 9/26/2019  # Sessions completed: DA only    Diagnosis at Intake  Major depression, recurrent, moderate  Marijuana abuse  Alcohol abuse  Chronic Pain Syndrome    Diagnosis at Discharge  Major depression, recurrent, moderate  Marijuana abuse  Alcohol abuse  Chronic Pain Syndrome      Additional comments: Pt attended diagnostic assessment, did not schedule a follow up appointment.  Pt's file is being closed due to no contact in 3 months.       Reason for discharge:unable to assess     Prognosis at discharge:Guarded    Recommendations at DA: Pt would benefit from a Rule 25 assessment.  He reports a desire to quit marijuana and alcohol if there would be an alternative for pain management.  Pt reported he would be willing to go to treatment if there were a plan to help him. Pt may also benefit from psychotherapy to address symptoms of depression, substance abuse/dependency and chronic pain management.  Encouraged him to follow up with Ginette Sommer CNP as scheduled.  Pt is regularly using alcohol and illicit marijuana to manage chronic pain, however had hx prior to chronic pain of substance use. Also has a family hx of substance abuse.  Pt was very open and honest during assessment, appears to have a genuine interest in making changes in his life.          Yocasta Taylor      9/26/2019  8:47 AM

## 2021-06-01 NOTE — TELEPHONE ENCOUNTER
ANTICOAGULATION  MANAGEMENT    Assessment     Today's INR result of 3.2 is Supratherapeutic (goal INR of 2.0-3.0)        Warfarin taken as previously instructed    ill last week a few days may be affecting diet and INR    No new medication/supplements affecting INR    Continues to tolerate warfarin with no reported s/s of bleeding or thromboembolism     Previous INR was Therapeutic    Plan:     Spoke with Brannon regarding INR result and instructed:     Warfarin Dosing Instructions:  Continue current warfarin dose 3.75 mg daily on Mondays, Wednesdays and Fridays; and 2.5 mg daily rest of week  (0 % change)    Instructed patient to follow up no later than: 1-2 weeks.    Education provided: importance of consistent vitamin K intake, impact of vitamin K foods on INR, importance of therapeutic range, importance of following up for INR monitoring at instructed interval and importance of taking warfarin as instructed    Bryn verbalizes understanding and agrees to warfarin dosing plan.    Instructed to call the [facility] for any changes, questions or concerns. (#[phone])   ?   Christiana Lorenz RN    Subjective/Objective:      Brannon Robb, a 59 y.o. male is on warfarin.     Brannon reports:     Home warfarin dose: verbally confirmed home dose with Bryn and updated on anticoagulation calendar     Missed doses: No     Medication changes:  No     S/S of bleeding or thromboembolism:  No     New Injury or illness:  Yes: stomach flu last week.     Changes in diet or alcohol consumption:  Yes: diet was off last week with being ill     Upcoming surgery, procedure or cardioversion:  No    Anticoagulation Episode Summary     Current INR goal:   2.0-3.0   TTR:   67.7 %   Next INR check:   9/26/2019   INR from last check:   3.20! (9/12/2019)   Weekly max warfarin dose:      Target end date:      INR check location:      Preferred lab:      Send INR reminders to:   VINAYAK LEONARDO    Indications    History of DVT  (deep vein thrombosis) [Z86.718]           Comments:            Anticoagulation Care Providers     Provider Role Specialty Phone number    Gilberto Arnold MD Referring Family Medicine [phone]

## 2021-06-01 NOTE — PROGRESS NOTES
ASSESSMENT/PLAN:    Hip pain, right  Labrum tear based on MRI  Has appointment to see orthopedic this week  He has a pain clinic; will defer pain management to them    Pain in joint, ankle and foot, right  Degenerative changes per MRI  Has appointment to see orthopedic this week  He has a pain clinic; will defer pain management to them    Chronic Pain Syndrome (hips, knees, back, shoulders)  He has a pain clinic; will defer pain management to them  Currently taking gabapentin but doesn't think  Pain is well addressed with this dose  There is a note in the chart from his primary pain provider about Rule 25 inpatient treatment    Right inguinal lymph node  Ultrasound-guided fine-needle aspiration was performed on May 23, 2018 with yield of scant mixed lymphoid tissue without evidence of malignancy    He stated he will be moving 100 miles west to [place].  He bought property there and intends to live there during the summer and [place] in the winter/cold months.    CHIEF COMPLAINT:  Chief Complaint   Patient presents with     Test Results     MRI follow up        HISTORY OF PRESENT ILLNESS:  Brannon is a 59 y.o. male presenting to the clinic today for right hip and ankle pain.     Right Ankle: He is not taking any pain medication for his hip pain. He is still on gabapentin. He was no longer drinking alcohol but since he got cut off from his pain medication he began drinking again. His next appointment with the pain clinic should be in the middle of October. He is being seen by Dr. Foley at [facility]. The alcohol and marijuana are no longer helping his pain any more. He had an MRI take on 8/20/2019 of his right hip. He had an MRI taken on 9/5/2019 of his right ankle. The results from the MRIs are below:     MRI of Right Ankle from /5/2019 Findings: 1) Marked second TMT and navicular-medial cuneiform arthritic changes and moderate to marked third and fourth TMT joint arthritic changes. Differential  considerations include osteoarthritis and neuropathic arthropathy although crystalline arthropathy such as gout and inflammatory arthropathy are not completely excluded. 2) Marked atrophy of the imaged portions of the flexor hallucis longus, peroneal, and most of the right foot musculature. This could be due to denervation neuropathy secondary to diabetes mellitus although the etiology is not definitively determined by this MRI. Correlate with clinical history. 3) Slight posterior tibialis tenosynovitis. 4) Approximately 1.2 x 0.9 x 0.6 cm ganglion abutting the lateral aspect of the anterior tibialis tendon at the level of the medial cuneiform. 5) No fracture, dislocation, ligamentous tear, or talar dome osteochondral lesion.     MRI of Right Hip: 1) Complex degenerative tear of right hip labrum from the 1 o'clock position anterosuperiorly through the 3 o'clock position anteriorly and degenerative blunting of the labrum from the 12 o'clock position through the 1 o'clock position anterosuperiorly. No full-thickness chondral loss or subchondral cystic change/subchondral bone marrow edema is seen within the right hip although it must be noted that the cartilage is not well evaluated by this nonarthrogram study. 2) Right hip acetabular overcoverage. No MRI evidence of right femoral cam morphology. 3) Enlarged and prominent pelvic lymph nodes are indeterminate. Medial consultation is recommended. Correlation with clinical history and comparison with any available previous imaging of the abdomen and pelvis is recommended as first up in further evaluation. If this is not available or unrevealing, dedicated CT of the abdomen and pelvis could be obtained for further evaluation. 4) Moderate to marked bilaterally symmetric atrophy of the gluteus luann musculature. 5) No fracture, osseous stress injury, or tendinous pathology.       REVIEW OF SYSTEMS:   Constitutional: Negative.   HENT: Negative.   Eyes: Negative.    Respiratory: Negative.   Cardiovascular: Negative.   Gastrointestinal: Negative.   Endocrine: Negative.   Genitourinary: Negative.   Musculoskeletal: Negative.   Skin: Negative.   Allergic/Immunologic: Negative.   Neurological: Negative.   Hematological: Negative.   Psychiatric/Behavioral: Negative.   All other systems are negative.    PFSH:  [place] He just bought the house this Saturday with 5 aches. He bought it from his friend's wife since his friend just had a stroke. He had stopped drinking for two weeks but then he was cut off of pain medication so he started to drink again.     TOBACCO USE:  Social History     Tobacco Use   Smoking Status Never Smoker   Smokeless Tobacco Never Used       VITALS:  Vitals:    09/09/19 1049 09/09/19 1220   BP: 136/72    Patient Site: Left Arm    Patient Position: Sitting    Cuff Size: Adult Large    Pulse: (!) 120 100   Weight: (!) 272 lb 12.8 oz (123.7 kg)      Wt Readings from Last 3 Encounters:   09/09/19 (!) 272 lb 12.8 oz (123.7 kg)   09/03/19 (!) 260 lb (117.9 kg)   07/30/19 (!) 265 lb (120.2 kg)       PHYSICAL EXAM:  Constitutional: Patient is oriented to person, place, and time. Patient appears well-developed and well-nourished. No distress.   Head: Normocephalic and atraumatic.   Right Ear: External ear normal.   Left Ear: External ear normal.   Nose: Nose normal.   Eyes: Conjunctivae and EOM are normal. Right eye exhibits no discharge. Left eye exhibits no discharge. No scleral icterus.   Neurological: Patient is alert and oriented to person, place, and time. No cranial nerve deficit. Coordination normal.   Skin: No rash noted. Patient is not diaphoretic. No erythema. No pallor.      ADDITIONAL HISTORY SUMMARIZED (2): Reviewed note from RENALDO Sommer on 9/3/2019 in regards to pain management.   DECISION TO OBTAIN EXTRA INFORMATION (1): None.   RADIOLOGY TESTS (1): Reviewed  MRI of Right Hip and Ankle from 8/20 and 9/5 showing labrum tear in  hip and arthritis in ankle. Reviewed US Lymph Node from 5/23/2018 normal.   LABS (1): cytology results from lymph node biopsy 5/23/18.  MEDICINE TESTS (1): None.  INDEPENDENT REVIEW (2 each): None.     The visit lasted a total of 18 minutes face to face with the patient. Over 50% of the time was spent counseling and educating the patient about hip and ankle pain.    I, Danna Beverly,  am scribing for and in the presence of, Dr. Feliciano.    I, Dr. Feliciano, personally performed the services described in this documentation, as scribed by Danna Beverly in my presence, and it is both accurate and complete.    MEDICATIONS:  Current Outpatient Medications   Medication Sig Dispense Refill     ACCU-CHEK FASTCLIX USE AS DIRECTED 102 each 3     ACCU-CHEK JOSELIN Misc TEST BLOOD SUGAR EVERY DAY 1 each 0     ACCU-CHEK SMARTVIEW TEST STRIP strips USE AS DIRECTED 100 strip 3     albuterol (PROAIR HFA;PROVENTIL HFA;VENTOLIN HFA) 90 mcg/actuation inhaler Inhale 2 puffs every 4 (four) hours as needed. 1 Inhaler 3     blood sugar diagnostic (GLUCOSE BLOOD) Strp Use As Directed. Use as directed       citalopram (CELEXA) 20 MG tablet Take 1 tablet (20 mg total) by mouth daily. 90 tablet 3     cyanocobalamin 1000 MCG tablet Take 1 tablet (1,000 mcg total) by mouth daily. 90 tablet 3     DULoxetine (CYMBALTA) 30 MG capsule TAKE ONE CAPSULE BY MOUTH ONE TIME DAILY  90 capsule 3     EPINEPHrine (EPIPEN) 0.3 mg/0.3 mL (1:1,000) atIn Inject 0.3 mL (0.3 mg total) into the shoulder, thigh, or buttocks as needed. 3 Device 3     gabapentin (NEURONTIN) 300 MG capsule TAKE 1 CAPSULE BY MOUTH THREE TIMES DAILY AND 3 CAPUSLES AT BEDTIME 180 capsule 1     glipiZIDE (GLUCOTROL XL) 10 MG 24 hr tablet TAKE TWO TABLETS BY MOUTH DAILY  180 tablet 2     hydrOXYzine HCl (ATARAX) 25 MG tablet Take 25 mg by mouth every 4 (four) hours as needed for itching.       LANCETS MISC Use As Directed 2 (two) times a day. Use with ros contour twice daily       lisinopril  (PRINIVIL,ZESTRIL) 10 MG tablet TAKE ONE TABLET BY MOUTH ONE TIME DAILY  90 tablet 2     metFORMIN (GLUCOPHAGE) 1000 MG tablet TAKE ONE TABLET BY MOUTH TWICE DAILY  180 tablet 2     pioglitazone (ACTOS) 30 MG tablet TAKE ONE TABLET BY MOUTH ONE TIME DAILY  90 tablet 3     simvastatin (ZOCOR) 80 MG tablet Take 1 tablet (80 mg total) by mouth at bedtime. 90 tablet 3     traMADol (ULTRAM) 50 mg tablet Take 1 tablet (50 mg total) by mouth 2 (two) times a day. 60 tablet 0     warfarin (JANTOVEN) 2.5 MG tablet Take 1- 1 1/2 tablets (2.5-3.75 mg) daily as directed. Adjust dose per INR results. 135 tablet 1     ergocalciferol (VITAMIN D2) 50,000 unit capsule Take 1 capsule (50,000 Units total) by mouth every 7 days. 4 capsule 2     No current facility-administered medications for this visit.        Total data points: 4

## 2021-06-02 ENCOUNTER — RECORDS - HEALTHEAST (OUTPATIENT)
Dept: ADMINISTRATIVE | Facility: CLINIC | Age: 61
End: 2021-06-02

## 2021-06-02 VITALS — HEIGHT: 69 IN | WEIGHT: 260 LBS | BODY MASS INDEX: 38.51 KG/M2

## 2021-06-02 VITALS — WEIGHT: 270 LBS | BODY MASS INDEX: 39.99 KG/M2 | HEIGHT: 69 IN

## 2021-06-02 VITALS — BODY MASS INDEX: 40.05 KG/M2 | HEIGHT: 69 IN | WEIGHT: 270.38 LBS

## 2021-06-02 VITALS — HEIGHT: 68 IN | WEIGHT: 262 LBS | BODY MASS INDEX: 39.71 KG/M2

## 2021-06-02 VITALS — BODY MASS INDEX: 40.3 KG/M2 | WEIGHT: 267 LBS

## 2021-06-02 VITALS — WEIGHT: 270.8 LBS | BODY MASS INDEX: 39.99 KG/M2

## 2021-06-02 VITALS — HEIGHT: 68 IN | BODY MASS INDEX: 40.47 KG/M2 | WEIGHT: 267 LBS

## 2021-06-02 NOTE — TELEPHONE ENCOUNTER
ANTICOAGULATION  MANAGEMENT    Assessment     Today's INR result of 2.9 is Therapeutic (goal INR of 2.0-3.0)        Warfarin taken as previously instructed    No new diet changes affecting INR    No new medication/supplements affecting INR    Continues to tolerate warfarin with no reported s/s of bleeding or thromboembolism     Previous INR was Supratherapeutic    Plan:     Spoke with Brannon regarding INR result and instructed:     Warfarin Dosing Instructions:  Continue current warfarin dose 3.75 mg daily on Mondays, Wednesdays and Fridays; and 2.5 mg daily rest of week  (0 % change)    Instructed patient to follow up no later than: 1-2 weeks.    Education provided: importance of therapeutic range, importance of following up for INR monitoring at instructed interval and importance of taking warfarin as instructed    Bryn verbalizes understanding and agrees to warfarin dosing plan.    Instructed to call the [facility] for any changes, questions or concerns. (#[phone])   ?   Christiana Lorenz RN    Subjective/Objective:      Brannon Robb, a 59 y.o. male is on warfarin.     Brannon reports:     Home warfarin dose: verbally confirmed home dose with Bryn and updated on anticoagulation calendar     Missed doses: No     Medication changes:  No     S/S of bleeding or thromboembolism:  No     New Injury or illness:  No     Changes in diet or alcohol consumption:  No     Upcoming surgery, procedure or cardioversion:  No    Anticoagulation Episode Summary     Current INR goal:   2.0-3.0   TTR:   62.0 %   Next INR check:   10/21/2019   INR from last check:   2.90 (10/7/2019)   Weekly max warfarin dose:      Target end date:      INR check location:      Preferred lab:      Send INR reminders to:   VINAYAK LEONARDO    Indications    History of DVT (deep vein thrombosis) [Z86.718]           Comments:            Anticoagulation Care Providers     Provider Role Specialty Phone number    Gilberto Arnold  MD [facility] [phone]

## 2021-06-02 NOTE — TELEPHONE ENCOUNTER
Refill Approved    Rx renewed per Medication Renewal Policy. Medication was last renewed on 10/17/18.    Sherita Gibson, [facility] Triage/Med Refill 10/28/2019     Requested Prescriptions   Pending Prescriptions Disp Refills     simvastatin (ZOCOR) 80 MG tablet [Pharmacy Med Name: Simvastatin Oral Tablet 80 MG] 90 tablet 2     Sig: TAKE 1 TABLET BY MOUTH AT BEDTIME       Statins Refill Protocol (Hmg CoA Reductase Inhibitors) Passed - 10/28/2019  9:41 AM        Passed - PCP or prescribing provider visit in past 12 months      Last office visit with prescriber/PCP: 9/9/2019 Gilberto Arnold MD OR same dept: 9/9/2019 Gilberto Arnold MD OR same specialty: 9/9/2019 Gilberto Arnold MD  Last physical: 1/29/2019 Last MTM visit: Visit date not found   Next visit within 3 mo: Visit date not found  Next physical within 3 mo: Visit date not found  Prescriber OR PCP: Gilberto Bravo MD  Last diagnosis associated with med order: 1. Dyslipidemia  - simvastatin (ZOCOR) 80 MG tablet [Pharmacy Med Name: Simvastatin Oral Tablet 80 MG]; TAKE 1 TABLET BY MOUTH AT BEDTIME  Dispense: 90 tablet; Refill: 2    If protocol passes may refill for 12 months if within 3 months of last provider visit (or a total of 15 months).

## 2021-06-02 NOTE — TELEPHONE ENCOUNTER
Dr. Feliciano    A screening colonoscopy was ordered for Bryn on 5/21/18 but never scheduled.    Is he a candidate for cologuard?    Laura

## 2021-06-02 NOTE — TELEPHONE ENCOUNTER
ANTICOAGULATION  MANAGEMENT PROGRAM    Brannon Robb is overdue for INR check.  Reminder call made.    Left message for Brannon. Instructed to test INR and call result into home monitoring company as soon as possible.    Christiana Lorenz RN

## 2021-06-03 ENCOUNTER — RECORDS - HEALTHEAST (OUTPATIENT)
Dept: ADMINISTRATIVE | Facility: CLINIC | Age: 61
End: 2021-06-03

## 2021-06-03 VITALS — WEIGHT: 270 LBS | HEIGHT: 69 IN | BODY MASS INDEX: 39.99 KG/M2

## 2021-06-03 VITALS — WEIGHT: 267 LBS | BODY MASS INDEX: 39.43 KG/M2

## 2021-06-03 VITALS
HEART RATE: 100 BPM | DIASTOLIC BLOOD PRESSURE: 72 MMHG | BODY MASS INDEX: 40.29 KG/M2 | WEIGHT: 272.8 LBS | SYSTOLIC BLOOD PRESSURE: 136 MMHG

## 2021-06-03 VITALS — BODY MASS INDEX: 39.5 KG/M2 | WEIGHT: 275.9 LBS | HEIGHT: 70 IN

## 2021-06-03 VITALS — BODY MASS INDEX: 40.32 KG/M2 | WEIGHT: 273 LBS

## 2021-06-03 VITALS — BODY MASS INDEX: 39.55 KG/M2 | WEIGHT: 267 LBS | HEIGHT: 69 IN

## 2021-06-03 VITALS — BODY MASS INDEX: 39.1 KG/M2 | WEIGHT: 264.8 LBS

## 2021-06-03 VITALS — BODY MASS INDEX: 38.51 KG/M2 | WEIGHT: 260 LBS | HEIGHT: 69 IN

## 2021-06-03 VITALS — BODY MASS INDEX: 40.61 KG/M2 | WEIGHT: 275 LBS

## 2021-06-03 VITALS — HEIGHT: 69 IN | WEIGHT: 270 LBS | BODY MASS INDEX: 39.99 KG/M2

## 2021-06-03 VITALS — WEIGHT: 265 LBS | BODY MASS INDEX: 39.25 KG/M2 | HEIGHT: 69 IN

## 2021-06-03 NOTE — TELEPHONE ENCOUNTER
ANTICOAGULATION  MANAGEMENT PROGRAM    Brannon Robb is overdue for INR check.     First reminder letter sent.       Darleen Dubon

## 2021-06-03 NOTE — TELEPHONE ENCOUNTER
ANTICOAGULATION  MANAGEMENT    Assessment     Today's INR result of 1.6 is Subtherapeutic (goal INR of 2.0-3.0)        Missed dose(s) may be affecting INR    No new diet changes affecting INR    No new medication/supplements affecting INR    Continues to tolerate warfarin with no reported s/s of bleeding or thromboembolism     Previous INR was Therapeutic    Plan:     Spoke with Brannon regarding INR result and instructed: He is currently moving to [place]. He will be looking for a provider there once he is all settled in. He has not had time for that yet. Will keep [facility] updated when finds a new physician.     Warfarin Dosing Instructions:  Take 5 mg today then continue current warfarin dose 3.75 mg daily on Mondays, Wednesdays and Fridays; and 2.5 mg daily rest of week  (0 % change)    Instructed patient to follow up no later than: 1-2 weeks with home machine.    Education provided: importance of therapeutic range, importance of following up for INR monitoring at instructed interval and importance of taking warfarin as instructed    Bryn verbalizes understanding and agrees to warfarin dosing plan.    Instructed to call the [facility] for any changes, questions or concerns. (#[phone])   ?   Christiana Lorenz RN    Subjective/Objective:      Brannon Robb, a 59 y.o. male is on warfarin.     Brannon reports:     Home warfarin dose: verbally confirmed home dose with Bryn and updated on anticoagulation calendar     Missed doses: Yes: at least one day.     Medication changes:  No     S/S of bleeding or thromboembolism:  No     New Injury or illness:  No     Changes in diet or alcohol consumption:  No     Upcoming surgery, procedure or cardioversion:  No    Anticoagulation Episode Summary     Current INR goal:   2.0-3.0   TTR:   66.6 % (1 y)   Next INR check:   12/9/2019   INR from last check:   1.60! (11/25/2019)   Weekly max warfarin dose:      Target end date:      INR check location:      Preferred lab:       Send INR reminders to:   VINAYAK LEONARDO    Indications    History of DVT (deep vein thrombosis) [Z86.718]           Comments:            Anticoagulation Care Providers     Provider Role Specialty Phone number    Gilberto Arnold MD Referring Family Medicine [phone]

## 2021-06-03 NOTE — TELEPHONE ENCOUNTER
ANTICOAGULATION  MANAGEMENT PROGRAM    Brannon Robb is overdue for INR check.     Left message to call and schedule INR appointment as soon as possible.      Chelsea Wright RN

## 2021-06-04 VITALS
BODY MASS INDEX: 40.02 KG/M2 | DIASTOLIC BLOOD PRESSURE: 76 MMHG | HEART RATE: 83 BPM | OXYGEN SATURATION: 97 % | SYSTOLIC BLOOD PRESSURE: 128 MMHG | WEIGHT: 271 LBS

## 2021-06-04 NOTE — TELEPHONE ENCOUNTER
ANTICOAGULATION  MANAGEMENT    Assessment     Today's INR result of 2.6 is Therapeutic (goal INR of 2.0-3.0)        Warfarin taken as previously instructed    No new diet changes affecting INR    No new medication/supplements affecting INR    Continues to tolerate warfarin with no reported s/s of bleeding or thromboembolism     Previous INR was Subtherapeutic    Plan:     Spoke with Brannon regarding INR result and instructed:     Warfarin Dosing Instructions:  Continue current warfarin dose 3.75 mg daily on Mondays, Wednesdays and Fridays; and 2.5 mg daily rest of week  (0 % change)    Instructed patient to follow up no later than: two weeks.    Education provided: importance of therapeutic range, importance of following up for INR monitoring at instructed interval and importance of taking warfarin as instructed    Bryn verbalizes understanding and agrees to warfarin dosing plan.    Instructed to call the [facility] for any changes, questions or concerns. (#[phone])   ?   Christiana Lorenz RN    Subjective/Objective:      Brannon Robb, a 59 y.o. male is on warfarin.     Brannon reports:     Home warfarin dose: verbally confirmed home dose with Bryn and updated on anticoagulation calendar     Missed doses: No     Medication changes:  No     S/S of bleeding or thromboembolism:  No     New Injury or illness:  No     Changes in diet or alcohol consumption:  No     Upcoming surgery, procedure or cardioversion:  No    Anticoagulation Episode Summary     Current INR goal:   2.0-3.0   TTR:   68.2 % (1 y)   Next INR check:   1/2/2020   INR from last check:   2.60 (12/18/2019)   Weekly max warfarin dose:      Target end date:      INR check location:      Preferred lab:      Send INR reminders to:   VINAYAK LEONARDO    Indications    History of DVT (deep vein thrombosis) [Z86.718]           Comments:            Anticoagulation Care Providers     Provider Role Specialty Phone number    Gilberto Arnold  MD [facility] [phone]

## 2021-06-04 NOTE — PATIENT INSTRUCTIONS - HE
Keep an eye on things for the next week or so - if needed we will order the shoulder MRI (ok to call with update)   Ok to put ice and/or heat to the shoulder  Start printed exercises for therapy on the shoulder  Use tylenol 1000mg three times a day for pain  Continue gabapentin
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
## 2021-06-04 NOTE — TELEPHONE ENCOUNTER
ANTICOAGULATION  MANAGEMENT-Patient Home Monitoring Result    Assessment     Therapeutic INR result of 2.4 (goal INR of 2.0-3.0) received via fax from [employer] home INR monitoring company.        Previous INR was Therapeutic    Brannon Robb last contacted by phone: 12/18    Plan     Per home monitoring agreement with patient, patient was NOT contacted regarding therapeutic result today.  Patient is to continue current dose and continue to check INR with home monitor per protocol.  ?   Christiana Lorenz RN    Subjective/Objective:      Brannon Robb, a 59 y.o. male  is established on warfarin using a home INR monitor.    Anticoagulation Episode Summary     Current INR goal:   2.0-3.0   TTR:   70.0 % (1 y)   Next INR check:   1/17/2020   INR from last check:   2.40 (1/3/2020)   Weekly max warfarin dose:      Target end date:      INR check location:      Preferred lab:      Send INR reminders to:   VINAYAK LEONARDO    Indications    History of DVT (deep vein thrombosis) [Z86.718]           Comments:            Anticoagulation Care Providers     Provider Role Specialty Phone number    Gilberto Arnold MD Referring Family Medicine [phone]

## 2021-06-04 NOTE — TELEPHONE ENCOUNTER
New Appointment Needed  What is the reason for the visit:    Same Date/Next Day Appt Request  What is the reason for your visit?: right shoulder and left hip pain from fall on 11/26    Provider Preference: Any available  How soon do you need to be seen?: today  Waitlist offered?: No  Okay to leave a detailed message:  Yes

## 2021-06-05 NOTE — TELEPHONE ENCOUNTER
Please schedule patient for a follow up apt.  If patient is wanting to get 1 additional fill he needs to have an apt set up.  Please route back to the pool once scheduled

## 2021-06-05 NOTE — TELEPHONE ENCOUNTER
ANTICOAGULATION  MANAGEMENT PROGRAM    Brannon Robb is overdue for INR check.     Left message to check INR with home meter and call results to home monitoring company as soon as possible.      Vivian Diaz RN

## 2021-06-05 NOTE — TELEPHONE ENCOUNTER
Completed last script w/o visit  Requested Prescriptions     Signed Prescriptions Disp Refills     gabapentin (NEURONTIN) 300 MG capsule 180 capsule 0     Sig: TAKE 1 CAPSULE BY MOUTH 3 TIMES DAILY AND 3 CAPSULES AT BEDTIME     Authorizing Provider: FELICE WILSON

## 2021-06-05 NOTE — TELEPHONE ENCOUNTER
Called pt, who states he's closing on his house in the [facility] on Wednesday and would like to  Gabapentin at [facility] that day.  States he'll get a MD in [place], and will schedule at [facility] if he can't get established right away. We reviewed that 1 month script is all that will be given from this clinic without an appt.

## 2021-06-05 NOTE — TELEPHONE ENCOUNTER
Anticoagulation Annual Referral Renewal Review    Brannon Robb's chart reviewed for annual renewal of referral to anticoagulation monitoring.        Criteria for anticoagulation nurse and/or pharmacist renewal met   Warfarin indication: DVT Yes , DVT/PE with previous provider documentation patient to be on extended anticoagulation   Current with INR monitoring/compliant Yes Yes   Date of last office visit 9/9/19 Yes, had office visit within last year   Time in Therapeutic Range (TTR) 70 % Yes, TTR > 60%       Brannon Robb met all criteria for anticoagulation management program initiated renewal.  New INR standing orders and anticoagulation referral renewal placed.      Christiana Lorenz RN  10:22 AM

## 2021-06-05 NOTE — TELEPHONE ENCOUNTER
ANTICOAGULATION  MANAGEMENT-Patient Home Monitoring Result    Assessment     Therapeutic INR result of 2.3 (goal INR of 2.0-3.0) received via fax from [employer] home INR monitoring company.        Previous INR was Therapeutic    Brannon Robb last contacted by phone: 1/24    Plan     Per home monitoring agreement with patient, patient was NOT contacted regarding therapeutic result today.  Patient is to continue current dose and continue to check INR with home monitor per protocol.  ?   Christiana Lorenz RN    Subjective/Objective:      Brannon Robb, a 59 y.o. male  is established on warfarin using a home INR monitor.    Anticoagulation Episode Summary     Current INR goal:   2.0-3.0   TTR:   75.3 % (1 y)   Next INR check:   2/18/2020   INR from last check:   2.30 (2/4/2020)   Weekly max warfarin dose:      Target end date:      INR check location:      Preferred lab:      Send INR reminders to:   VINAYAK LEONARDO    Indications    History of DVT (deep vein thrombosis) [Z86.718]           Comments:            Anticoagulation Care Providers     Provider Role Specialty Phone number    Gilberto Arnold MD Referring Family Medicine [phone]

## 2021-06-05 NOTE — TELEPHONE ENCOUNTER
Spoke to pt, he has moved to [place] and has not yet established a new Dr.  Please call pt, questioning if still able to fill RX.

## 2021-06-05 NOTE — TELEPHONE ENCOUNTER
ANTICOAGULATION  MANAGEMENT PROGRAM    Brannon Robb is overdue for INR check.     Spoke with Bryn. Instructed to test INR with home meter and call results in to home monitoring company as soon as possible.       Christiana Lorenz RN

## 2021-06-06 NOTE — TELEPHONE ENCOUNTER
Refill Approved    Rx renewed per Medication Renewal Policy. Medication was last renewed on 1/29/19.    Sherita Gibson, [facility] Connection Triage/Med Refill 2/27/2020     Requested Prescriptions   Pending Prescriptions Disp Refills     citalopram (CELEXA) 20 MG tablet [Pharmacy Med Name: Citalopram Hydrobromide Oral Tablet 20 MG] 90 tablet 2     Sig: TAKE ONE TABLET BY MOUTH ONE TIME DAILY       SS[facility] Refill Protocol  Passed - 2/27/2020  9:48 AM        Passed - PCP or prescribing provider visit in last year     Last office visit with prescriber/PCP: 9/9/2019 Gilberto Arnold MD OR same dept: 9/9/2019 Gilberto Arnold MD OR same specialty: 12/27/2019 Andria Ventura MD  Last physical: 1/29/2019 Last MTM visit: Visit date not found   Next visit within 3 mo: Visit date not found  Next physical within 3 mo: Visit date not found  Prescriber OR PCP: Gilberto Bravo MD  Last diagnosis associated with med order: 1. Anxiety and depression  - citalopram (CELEXA) 20 MG tablet [Pharmacy Med Name: Citalopram Hydrobromide Oral Tablet 20 MG]; TAKE ONE TABLET BY MOUTH ONE TIME DAILY   Dispense: 90 tablet; Refill: 2    If protocol passes may refill for 12 months if within 3 months of last provider visit (or a total of 15 months).

## 2021-06-06 NOTE — TELEPHONE ENCOUNTER
Patient has now established care with a doctor in [place] where he now lives. Discussed to make sure that he calls his INR results to his provider in [place] today. They will also need to update the home monitor company of change in providers so that they will start receiving the INR faxed results. Bryn understands and agrees with this plan. ACM referral discontinued at this time.

## 2021-06-06 NOTE — TELEPHONE ENCOUNTER
Received a faxed INR result for Brannon Robb  From [facility]  INR result dated 3/4/2020 is 1.5

## 2021-06-07 NOTE — TELEPHONE ENCOUNTER
Orders being requested: INR Monitor  Reason service is needed/diagnosis: Vascular Implant  When are orders needed by: ASAP  Where to send Orders: Fax: [phone]  Okay to leave detailed message?  Yes [phone]    Order faxed 05/05/2020; and 04/27/20 need that form filled out and returned.

## 2021-06-07 NOTE — TELEPHONE ENCOUNTER
RN cannot approve Refill Request    RN can NOT refill this medication Protocol failed and NO refill given.      Sherita Gibson, Care Connection Triage/Med Refill 3/23/2020    Requested Prescriptions   Pending Prescriptions Disp Refills     pioglitazone (ACTOS) 30 MG tablet [Pharmacy Med Name: Pioglitazone HCl Oral Tablet 30 MG] 90 tablet 2     Sig: TAKE ONE TABLET BY MOUTH ONE TIME DAILY       Pioglitazone Protocol Failed - 3/21/2020 12:58 PM        Failed - Visit with PCP or prescribing provider visit in last 6 months      Last office visit with prescriber/PCP: Visit date not found OR same dept: 9/9/2019 Gilberto Arnold MD OR same specialty: 12/27/2019 Andria Ventura MD Last physical: Visit date not found Last MTM visit: Visit date not found         Next appt within 3 mo: Visit date not found  Next physical within 3 mo: Visit date not found  Prescriber OR PCP: Gilberto Bravo MD  Last diagnosis associated with med order: 1. Type 2 diabetes mellitus with complication, without long-term current use of insulin (H)  - pioglitazone (ACTOS) 30 MG tablet [Pharmacy Med Name: Pioglitazone HCl Oral Tablet 30 MG]; TAKE ONE TABLET BY MOUTH ONE TIME DAILY   Dispense: 90 tablet; Refill: 2     If protocol passes may refill for 12 months if within 3 months of last provider visit (or a total of 15 months).           Failed - A1C in last 6 months     Hemoglobin A1c   Date Value Ref Range Status   07/01/2019 5.9 3.5 - 6.0 % Final               Passed - Blood pressure in last 12 months     BP Readings from Last 1 Encounters:   12/27/19 128/76             Passed - LFT or AST or ALT in last 12 months     Albumin   Date Value Ref Range Status   04/15/2019 4.0 3.5 - 5.0 g/dL Final     Bilirubin, Total   Date Value Ref Range Status   04/15/2019 0.4 0.0 - 1.0 mg/dL Final     Bilirubin, Direct   Date Value Ref Range Status   03/04/2015 0.2 <=0.5 mg/dL Final     Alkaline Phosphatase   Date Value Ref Range Status    04/15/2019 61 45 - 120 U/L Final     AST   Date Value Ref Range Status   04/15/2019 22 0 - 40 U/L Final     ALT   Date Value Ref Range Status   04/15/2019 15 0 - 45 U/L Final     Protein, Total   Date Value Ref Range Status   04/15/2019 7.7 6.0 - 8.0 g/dL Final                Passed - Microalbumin in last year     Microalbumin, Random Urine   Date Value Ref Range Status   07/01/2019 9.41 (H) 0.00 - 1.99 mg/dL Final                  Passed - Serum creatinine in last year     Creatinine   Date Value Ref Range Status   04/15/2019 0.78 0.70 - 1.30 mg/dL Final

## 2021-06-08 NOTE — TELEPHONE ENCOUNTER
Message left for Bryn that he needs to contact his new primary care doctor so they can get INR results faxed to them. Also to please call them to report INR today of 2.1.

## 2021-06-08 NOTE — TELEPHONE ENCOUNTER
Called and found they had the wrong fax. Gave the [facility] fax: [phone]. Sonia will re-fax ppwk

## 2021-06-09 NOTE — PROGRESS NOTES
ASSESSMENT/PLAN:  1. Cough  Recommend supportive cares  Call if not better or if symptoms worsen    2. Chronic Pain Syndrome (hips, knees, back, shoulders)  Will switch percocet to tramadol.  May want to explore [place] Medical Marijuana Program  - traMADol (ULTRAM) 50 mg tablet; Take 1 tablet (50 mg total) by mouth every 6 (six) hours as needed for pain.  Dispense: 30 tablet; Refill: 0    3. HTN (hypertension)  refilled  - Comprehensive Metabolic Panel  - lisinopril (PRINIVIL,ZESTRIL) 10 MG tablet; TAKE ONE TABLET BY MOUTH ONCE DAILY  Dispense: 90 tablet; Refill: 1    4. Type 2 diabetes mellitus   Has not been seen for over 1 year  Has had changed in insurance but it sounds like his medical insurance is settled now  Taking metformin 1000mg BID and glipizide 20mg  A1c today =8.0  Will add insulin 10units once daily  H/o pancreatitis, advised avoiding ETOH  Advised healthy lifestyle modifications  Please check blood sugar more regularly  Please call blood sugar numbers to me in 2 wks  Please follow up more regularly (every 3 months)  Good job on [employer] 3x/week    - Glycosylated Hemoglobin A1c  - Microalbumin, Random Urine    5. Mixed hyperlipidemia  Taking simvastatin 80mg  - Lipid Cascade    6. Major depressive disorder, anxiety  Stable on celexa 20mg  PHQ9=7, GAD7=2    7. Vitamin B12 Deficiency  Gets monthly B12 injection here in the clinic  - cyanocobalamin injection 1,000 mcg; Inject 1 mL (1,000 mcg total) into the shoulder, thigh, or buttocks every 30 (thirty) days.    8. History of DVT (deep vein thrombosis)  On anticoagulation    9. Prostate cancer screening  - PSA (Prostatic-Specific Antigen), Annual Screen    10. Need for TD vaccine  - Td, Adult, Adsorbed (blue label)      Orders Placed This Encounter   Procedures     Td, Adult, Adsorbed (blue label)     Glycosylated Hemoglobin A1c     Microalbumin, Random Urine     Comprehensive Metabolic Panel     Lipid Cascade     Order Specific Question:   Fasting is  required?     Answer:   Yes     PSA (Prostatic-Specific Antigen), Annual Screen           CHIEF COMPLAINT:  Chief Complaint   Patient presents with     Cough     x weeks productive cough     lab work     Medication Refill       HISTORY OF PRESENT ILLNESS:  Brannon is a 57 y.o. male presenting to the clinic today for a URI. He has had this for about 3-4 weeks. He has a cough that is productive. He has felt feverish and chilled. His does endorse some body aches. He has a bit of a sore throat. He is feeling some soreness in his chest from coughing. He has been using his albuterol a couple times per day since onset of illness. Denies nausea, vomiting, diarrhea. He does feel like he is getting somewhat better. He is not a smoker.     Chronic Pain: He has not taken his pain medication since November 2016. He says that he switched his insurance, and his new insurance does not cover his medication. He did have an injection in his right ankle that he did feel relief from. He is also having increased pain in his left knee. His back is also causing him pain. He uses a cane to walk. He uses cannabis for his pain. He goes to the [facility] 3 times per week.     Diabetes: His last A1c was 8.9% on 2/24/2016. He is currently taking metformin and glipizide for his diabetes. He was out of his metformin for about 4 months. His fasting sugar yesterday was 198. He does not consistently check his sugars. He is seeing Darleen Martinez, PharmD tomorrow for medication management.     History of DVT: He is taking 3.75mg of warfarin at night. He says that his INR has been stable in the therapeutic range. He has his own machine at home to check his INR, and tries to check it every 2 weeks.     Hypertension: His blood pressure is stable today at 128/84. He takes lisinopril for his blood pressure.     Anxiety/Depression: He is still taking Celexa. He feels like his anxiety/depression has been stable, and that he is feeling well.     Health Maintenance: He  is due for a tetanus booster. His colonoscopy is up to date.       REVIEW OF SYSTEMS:   Still taking simvastatin for his dyslipidemia. He gets his B12 injection once per month.   All other systems are negative.    PFSH:  Cannabis use.  Occasional alcohol use.     TOBACCO USE:  History   Smoking Status     Never Smoker   Smokeless Tobacco     Not on file       VITALS:  Vitals:    04/06/17 0948   BP: 128/84   Patient Site: Left Arm   Patient Position: Sitting   Cuff Size: Adult Large   Pulse: 83   Temp: 98.6  F (37  C)   TempSrc: Oral   SpO2: 98%   Weight: (!) 257 lb (116.6 kg)     Wt Readings from Last 3 Encounters:   04/06/17 (!) 257 lb (116.6 kg)   04/11/16 (!) 259 lb (117.5 kg)   04/05/16 (!) 260 lb (117.9 kg)       PHYSICAL EXAM:  Constitutional: Patient is oriented to person, place, and time. Patient appears well-developed and well-nourished. No distress.   Head: Normocephalic and atraumatic.   Right Ear: External ear normal. Mild erythema of ear canal. TM normal.   Left Ear: External ear normal. Mild erythema of ear canal. TM normal.   Nose: Nose normal.   Mouth/Throat: Slight peritonsillar erythema. No oropharyngeal exudate.   Eyes: Conjunctivae and EOM are normal. Pupils are equal, round, and reactive to light. Right eye exhibits no discharge. Left eye exhibits no discharge. No scleral icterus.   Cardiovascular: Normal rate, regular rhythm, normal heart sounds and intact distal pulses. No murmur heard.   Pulmonary/Chest: Effort normal and breath sounds normal. No stridor. No respiratory distress. Patient has no wheezes, no rales, exhibits no tenderness.   Abdominal: Soft. Bowel sounds are normal. Patient exhibits no distension and no mass. There is no tenderness. There is no rebound and no guarding.   Neurological: Patient is alert and oriented to person, place, and time. Patient has normal reflexes. No cranial nerve deficit. Coordination normal.   Skin: Skin is warm and dry. No rash noted. Patient is not  diaphoretic. No erythema. No pallor.    Results for orders placed or performed in visit on 04/06/17   Glycosylated Hemoglobin A1c   Result Value Ref Range    Hemoglobin A1c 8.0 (H) 3.5 - 6.1 %         ADDITIONAL HISTORY SUMMARIZED (2): None.  DECISION TO OBTAIN EXTRA INFORMATION (1): None.   RADIOLOGY TESTS (1): None.  LABS (1): Ordered labs today.  MEDICINE TESTS (1): None.  INDEPENDENT REVIEW (2 each): None.     The visit lasted a total of 40 minutes face to face with the patient. Over 50% of the time was spent counseling and educating the patient about his URI and chronic conditions.    IApril, am scribing for and in the presence of, Dr. Feliciano.    I, Dr. Feliciano, personally performed the services described in this documentation, as scribed by April Becerra in my presence, and it is both accurate and complete.    MEDICATIONS:  Current Outpatient Prescriptions   Medication Sig Dispense Refill     ACCU-CHEK JOSELIN Misc TEST BLOOD SUGAR EVERY DAY 1 each 0     albuterol (PROVENTIL HFA;VENTOLIN HFA) 90 mcg/actuation inhaler Inhale 2 puffs every 4 (four) hours as needed.       blood sugar diagnostic (GLUCOSE BLOOD) Strp Use As Directed. Use as directed       citalopram (CELEXA) 20 MG tablet TAKE 1 TABLET DAILY. 90 tablet 0     CONTOUR TEST STRIPS strips Use 1 each As Directed 3 (three) times a day. 100 each 11     cyanocobalamin 1,000 mcg/mL injection Inject 1,000 mcg into the shoulder, thigh, or buttocks every 30 (thirty) days.       EPINEPHrine (EPIPEN) 0.3 mg/0.3 mL (1:1,000) atIn Inject 0.3 mL (0.3 mg total) into the shoulder, thigh, or buttocks as needed. 3 Device 3     glipiZIDE (GLUCOTROL) 10 MG 24 hr tablet TAKE 2 TABLETS EVERY MORNING (DUE FOR LABS) 60 tablet 0     LANCETS MISC Use As Directed 2 (two) times a day. Use with ros contour twice daily       lisinopril (PRINIVIL,ZESTRIL) 10 MG tablet TAKE ONE TABLET BY MOUTH ONCE DAILY 90 tablet 1     metFORMIN (GLUCOPHAGE) 1000 MG tablet  TAKE ONE TABLET (1,000 MG TOTAL) BY MOUTH TWICE DAILY 180 tablet 0     simvastatin (ZOCOR) 80 MG tablet TAKE 1 TABLET AT BEDTIME 90 tablet 0     warfarin (COUMADIN) 2.5 MG tablet Take 1.5 tablets (3.75mg) by mouth daily, OR AS DIRECTED. Adjust dose based on INR results. 135 tablet 1     traMADol (ULTRAM) 50 mg tablet Take 1 tablet (50 mg total) by mouth every 6 (six) hours as needed for pain. 30 tablet 0     Current Facility-Administered Medications   Medication Dose Route Frequency Provider Last Rate Last Dose     [START ON 4/10/2017] cyanocobalamin injection 1,000 mcg  1,000 mcg Intramuscular Q30 Days Gilberto Bravo MD           Total data points: 1

## 2021-06-09 NOTE — TELEPHONE ENCOUNTER
Last refill Metformin 9/3/19 180 tab 2 refills    Last refill Lisinopril 8/17/2019 90 tab 2 refills    Last office visit 9/9/2019 for test result

## 2021-06-09 NOTE — TELEPHONE ENCOUNTER
Incoming fax from [facility]    INR of 2.5 on 7/18  This encounter was created in error - please disregard.

## 2021-06-09 NOTE — TELEPHONE ENCOUNTER
Received a faxed INR result for Brannon Robb  From [facility]  INR result dated 6/20/2020 is 2.6

## 2021-06-09 NOTE — PROGRESS NOTES
MTM Encounter    ASSESSMENT AND PLAN    1. Type 2 diabetes mellitus   Uncontrolled as evidenced by recent A1c of 8%. Goal A1c is <7%. He is on maximum dose of metformin and glipizide. PCP started Tresiba insulin yesterday and patient is waiting for supply to arrive via mail order. He is unsure how to use an insulin pen, so we spent time discussing this today using a demo pen. Medication education and counseling was provided, including indication, expected effect, dosing instructions, and possible side effects. The patient's questions were answered. Reviewed self-monitoring of blood glucose with the patient. Education provided on blood sugar goals, fasting of  and 2 hours post-prandial of <180. Instructed the patient to test 1-2 times daily. Also reviewed symptoms of hypoglycemia and appropriate management. Patient verbalized understanding. He does mention occasional diarrhea, which may or may not be related to metformin. Advised to take with food and if it continues to be a problem, we could try switching to the extended release formulation. Lastly, time was spent discussing optimal lifestyle modification for diabetes management. Advised to not skip meals, increase vegetable and protein intake, and limit simple carbs.   Plan   1. Start Tresiba insulin 10 units SQ every evening.   2. Check blood sugars 1-2 times per day.     2. Essential hypertension   Blood pressure is well controlled and meeting goal of <140/90 mm Hg. Appropriately on ACEi given microalbuminuria.     3. Mixed hyperlipidemia  Appropriately on a moderate intensity statin given age and diabetes diagnosis per ACC/AHA guidelines. Last LDL of 101 mg/dl.     4. History of DVT (deep vein thrombosis)  Appropriately on anticoagulation with warfarin as managed by [facility] team. Checks his INR regularly with home device. Aware of INR goal 2-3. No s/sx bleeding. I did advise against use of ibuprofen or other NSAIDs for pain given interaction of increased GI  bleeding risk while on warfarin.   Plan   1. Do not use NSAIDs.     5. Depression  Stable on citalopram for depression. Encouraged continued exercise and staying involved with enjoyable activities and hobbies.     6. Chronic Pain Syndrome   Chronic pain, mostly arthritic in nature. Advised against use of ibuprofen or other NSAIDs since he is on warfarin. Advised to use acetaminophen instead and we reviewed dosing instructions. He could also try a glucosamine supplement as some patients find this effective for arthritis joint pain. He is switching his prescription PRN pain medication from hydrocodone/APAP to tramadol due to insurance coverage. Reviewed dosing instructions for tramadol. He is interested in medical cannabis and PCP is looking into this as well.   Plan   1. Take Tylenol (acetaminophen) extra strength 1-2 tablets (500-1000 mg) every 6 hours as needed. NO more than 8 tablets in 24 hours.   2. Try glucosamine 1500 mg daily for a 3 month trial.         Follow up via phone in 1 week. Return in about 3 weeks (around 4/28/2017).      SUBJECTIVE AND OBJECTIVE  Brannon Robb is a 57 y.o. male here for a medication therapy management (MTM) appointment.     1. Type 2 diabetes mellitus   Brannon is taking metformin 1000 mg BID and glipizide ER 20 mg daily. Tresiba insulin was just added at his PCP visit yesterday, but he hasn't received this yet as he gets his meds through mail order. He notes some diarrhea sporadically. His A1c yesterday was 8%, down from 8.9% a year ago. He does not consistently check his sugars, but he does have a brand new meter. His BG the other day was 198. He didn't take medications from November to January due to lapse in insurance. He was diagnosed about 4-5 years ago. He does exercise three times a week. He usually eats two meals a day - breakfast and dinner. Avoids sugary drinks.   Lab Results   Component Value Date    HGBA1C 8.0 (H) 04/06/2017       2. Essential hypertension    Brannon is taking lisinopril 10mg daily. He is not sure what this medication is for.   BP Readings from Last 3 Encounters:   04/06/17 128/84   04/05/16 147/87   03/15/16 138/83       3. Mixed hyperlipidemia  Brannon is taking simvastatin 80 mg daily. He denies any side effects.     4. History of DVT (deep vein thrombosis)  Brannon is taking warfarin for anticoagulation due to history of DVT with INR goal of 2-3. He is taking 3.75mg of warfarin at night.  He has his own machine at home to check his INR, and tries to check it every 2 weeks. He bruises easily but no other s/sx of bleeding.   Lab Results   Component Value Date    INR 2.60 (!) 03/23/2017    INR 2.40 (!) 03/11/2017    INR 2.30 (!) 02/24/2017       5. Depression  Brannon is taking citalopram 20 mg daily. He feels like his anxiety/depression has been stable, and that he is feeling well. He is trying to stay busy and get a job. 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 He enjoys working on his car and going on walks with his dog.   PHQ-9 Total Score: 7 (4/6/2017  2:00 PM)  RUDDY-7 Total: 2 (4/6/2017  2:00 PM)    6. Chronic Pain Syndrome   Brannon was taking hydrocodone/APAP for chronic pain due to severe arthritis, but his new insurance will not cover this so Dr. Feliciano switched it to tramadol yesterday. He would take the hydrocodone/APAP infrequently for more severe pain. As for OTC pain relief, he does take ibuprofen. He uses cannabis for his pain and is interested in the medical cannabis program. He uses a cane to walk.He goes to the [facility] 3 times per week, he swims and does cardio.          Brannon's medication list was reviewed with them, discussing reason for use, directions for use, and potential side effects of each medication as needed. Indication, safety, efficacy, and convenience was assessed for all medications addressed above.  No environmental factors were noted currently affecting patient.  This care plan was communicated via  "EMR with his primary care provider, Gilberto Bravo MD, who is the authorizing prescriber for this visit.  Direct supervision was available by either the patient's PCP or other available provider.    Time Spent: 60 minutes  Time and complexity billing metrics are included in the doc-flowsheet linked to this visit.       Darleen Martinez, PharmD, BCACP    Current Outpatient Prescriptions   Medication Sig Dispense Refill     ACCU-CHEK JOSELIN Misc TEST BLOOD SUGAR EVERY DAY 1 each 0     albuterol (PROVENTIL HFA;VENTOLIN HFA) 90 mcg/actuation inhaler Inhale 2 puffs every 4 (four) hours as needed.       blood sugar diagnostic (GLUCOSE BLOOD) Strp Use As Directed. Use as directed       citalopram (CELEXA) 20 MG tablet TAKE 1 TABLET DAILY. 90 tablet 0     CONTOUR TEST STRIPS strips Use 1 each As Directed 3 (three) times a day. 100 each 11     cyanocobalamin 1,000 mcg/mL injection Inject 1,000 mcg into the shoulder, thigh, or buttocks every 30 (thirty) days.       EPINEPHrine (EPIPEN) 0.3 mg/0.3 mL (1:1,000) atIn Inject 0.3 mL (0.3 mg total) into the shoulder, thigh, or buttocks as needed. 3 Device 3     glipiZIDE (GLUCOTROL) 10 MG 24 hr tablet TAKE 2 TABLETS EVERY MORNING (DUE FOR LABS) 60 tablet 0     insulin degludec 100 unit/mL (3 mL) InPn Inject 10 Units under the skin at bedtime. 3 mL 3     LANCETS MISC Use As Directed 2 (two) times a day. Use with ros contour twice daily       lisinopril (PRINIVIL,ZESTRIL) 10 MG tablet TAKE ONE TABLET BY MOUTH ONCE DAILY 90 tablet 1     metFORMIN (GLUCOPHAGE) 1000 MG tablet TAKE ONE TABLET (1,000 MG TOTAL) BY MOUTH TWICE DAILY 180 tablet 0     pen needle, diabetic (BD ULTRA-FINE JOSELIN PEN NEEDLES) 32 gauge x 5/32\" Ndle Use to inject insulin once a day. 100 each 3     simvastatin (ZOCOR) 80 MG tablet TAKE 1 TABLET AT BEDTIME 90 tablet 0     traMADol (ULTRAM) 50 mg tablet Take 1 tablet (50 mg total) by mouth every 6 (six) hours as needed for pain. 30 tablet 0     warfarin " (COUMADIN) 2.5 MG tablet Take 1.5 tablets (3.75mg) by mouth daily, OR AS DIRECTED. Adjust dose based on INR results. 135 tablet 1     Current Facility-Administered Medications   Medication Dose Route Frequency Provider Last Rate Last Dose     [START ON 4/10/2017] cyanocobalamin injection 1,000 mcg  1,000 mcg Intramuscular Q30 Days Gilberto Bravo MD

## 2021-06-09 NOTE — TELEPHONE ENCOUNTER
This patient is no longer managed by [facility]. He has been instructed previously to contact his home monitoring company and his new PCP to discuss his INR results and warfarin dosing.

## 2021-06-10 NOTE — PROGRESS NOTES
Called patient to check in regarding insulin start. Unfortunately he has not started the Tresiba because he cannot afford the $400 copay. I did try entering Rx for alternative basal insulins, but all are around $300. He has a $400 deductible to meet and then the copay will likely lower, but not sure how much. Because his A1c is reasonably close to goal (A1c 8%), I think it would be reasonable to not start insulin, but rather add an oral pill, pioglitazone, which is available generically and would be more cost effective for the patient. He is agreeable to this plan. Will start pioglitazone 30 mg daily. Medication education and counseling was provided, including indication, expected effect, dosing instructions, and possible side effects. The patient's questions were answered. He will continue metformin and glipizide. Follow up with PharmD in 2 weeks.

## 2021-06-10 NOTE — TELEPHONE ENCOUNTER
Patied is out of medication.  Patient does home INR checks and follows with anticoagulation team.   Sarah Berry LPN

## 2021-06-10 NOTE — PROGRESS NOTES
MTM Encounter    ASSESSMENT AND PLAN    1. Type 2 diabetes mellitus   Blood sugars are improving after starting additional medication, pioglitazone, which patient is tolerating. Full effect can take up to 8 weeks and there is room to increase dose if needed. I'm glad to see he is now testing his BG regularly. Would advise continuing current medication regimen and also encouraged lifestyle modification for optimal control. I helped patient schedule follow up with Dr. Feliciano in mid-July for diabetes check up.     2. Essential hypertension   Blood pressure is well controlled and meeting goal of <140/90 mm Hg. Appropriately on ACEi given microalbuminuria.     3. Mixed hyperlipidemia  Appropriately on a moderate intensity statin given age and diabetes diagnosis per ACC/AHA guidelines. Last LDL of 101 mg/dl. He is on a higher than recommended dose of simvastatin (80 mg), but since he has been on this dose for a long period of time and tolerated, it is reasonable to continue.     4. Chronic Pain Syndrome   Chronic pain, mostly arthritic in nature. He is finding tramadol equally effective to hydrocodone/APAP. He could also try a glucosamine supplement as some patients find this effective for arthritis joint pain. He is interested in medical cannabis and PCP is looking into this as well.   Plan   1. Try glucosamine 1500 mg daily for a 3 month trial.         SUBJECTIVE AND OBJECTIVE  Brannon Robb is a 57 y.o. male here for a medication therapy management (MTM) appointment.     1. Type 2 diabetes mellitus   Brannon is taking metformin 1000 mg BID and glipizide ER 20 mg daily as well as pioglitazone 30 mg daily. He was supposed to start Tresiba insulin earlier this month, but insulin is too expensive given his insurance plan. We opted instead to add an additional oral generic medication, pioglitazone. He has only been on this for a week or so. He denies any side effects. He is testing his BG regularly now. BG this morning  was 128. Denies any hypoglycemia.     Lab Results   Component Value Date    HGBA1C 8.0 (H) 04/06/2017       2. Essential hypertension   rBannon is taking lisinopril 10mg daily.   BP Readings from Last 3 Encounters:   04/06/17 128/84   04/05/16 147/87   03/15/16 138/83       3. Mixed hyperlipidemia  Brannon is taking simvastatin 80 mg daily. He denies any side effects.     4. Chronic Pain Syndrome   Brannon was taking hydrocodone/APAP for chronic pain due to severe arthritis, but his new insurance will not cover this so Dr. Feliciano switched it to tramadol. He's been taking 1 tablet 50 mg tramadol every morning. He finds it just as effective. He uses cannabis for his pain and is interested in the medical cannabis program. He uses a cane to walk. He goes to the [facility] 3 times per week, he swims and does cardio.          Brannon's medication list was reviewed with them, discussing reason for use, directions for use, and potential side effects of each medication as needed. Indication, safety, efficacy, and convenience was assessed for all medications addressed above.  No environmental factors were noted currently affecting patient.  This care plan was communicated via EMR with his primary care provider, Gilberto Bravo MD, who is the authorizing prescriber for this visit.  Direct supervision was available by either the patient's PCP or other available provider.    Time Spent: 30 minutes  Time and complexity billing metrics are included in the doc-flowsheet linked to this visit.       Darleen Martinez, PharmD, BCACP    Current Outpatient Prescriptions   Medication Sig Dispense Refill     ACCU-CHEK JOSELIN Misc TEST BLOOD SUGAR EVERY DAY 1 each 0     albuterol (PROVENTIL HFA;VENTOLIN HFA) 90 mcg/actuation inhaler Inhale 2 puffs every 4 (four) hours as needed.       blood sugar diagnostic (GLUCOSE BLOOD) Strp Use As Directed. Use as directed       citalopram (CELEXA) 20 MG tablet TAKE 1 TABLET EVERY DAY 90 tablet 1      CONTOUR TEST STRIPS strips Use 1 each As Directed 3 (three) times a day. 100 each 11     cyanocobalamin 1,000 mcg/mL injection Inject 1,000 mcg into the shoulder, thigh, or buttocks every 30 (thirty) days.       EPINEPHrine (EPIPEN) 0.3 mg/0.3 mL (1:1,000) atIn Inject 0.3 mL (0.3 mg total) into the shoulder, thigh, or buttocks as needed. 3 Device 3     glipiZIDE (GLUCOTROL) 10 MG 24 hr tablet TAKE 2 TABLETS EVERY MORNING (DUE FOR LABS) 60 tablet 0     LANCETS MISC Use As Directed 2 (two) times a day. Use with ros contour twice daily       lisinopril (PRINIVIL,ZESTRIL) 10 MG tablet TAKE ONE TABLET BY MOUTH ONCE DAILY 90 tablet 1     metFORMIN (GLUCOPHAGE) 1000 MG tablet TAKE ONE TABLET (1,000 MG TOTAL) BY MOUTH TWICE DAILY 180 tablet 0     pioglitazone (ACTOS) 30 MG tablet Take 1 tablet (30 mg total) by mouth daily. 90 tablet 3     simvastatin (ZOCOR) 80 MG tablet TAKE 1 TABLET AT BEDTIME 90 tablet 0     warfarin (COUMADIN) 2.5 MG tablet Take 1.5 tablets (3.75mg) by mouth daily, OR AS DIRECTED. Adjust dose based on INR results. 135 tablet 1     Current Facility-Administered Medications   Medication Dose Route Frequency Provider Last Rate Last Dose     cyanocobalamin injection 1,000 mcg  1,000 mcg Intramuscular Q30 Days Gilberto Bravo MD
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
## 2021-06-11 NOTE — TELEPHONE ENCOUNTER
RN cannot approve Refill Request    RN can NOT refill this medication Protocol failed and NO refill given. Last office visit: 9/9/2019 Gilberto Arnold MD Last Physical: 1/29/2019 Last MTM visit: Visit date not found Last visit same specialty: 12/27/2019 Andria Ventura MD.  Next visit within 3 mo: Visit date not found  Next physical within 3 mo: Visit date not found      Sherita Gibson, [facility] Connection Triage/Med Refill 9/18/2020    Requested Prescriptions   Pending Prescriptions Disp Refills     glipiZIDE (GLUCOTROL XL) 10 MG 24 hr tablet [Pharmacy Med Name: glipiZIDE ER Oral Tablet Extended Release 24 Hour 10 MG] 180 tablet 0     Sig: TAKE TWO TABLETS BY MOUTH DAILY       Oral Hypoglycemics Refill Protocol Failed - 9/17/2020  9:19 AM        Failed - Visit with PCP or prescribing provider visit in last 6 months       Last office visit with prescriber/PCP: Visit date not found OR same dept: Visit date not found OR same specialty: 12/27/2019 Andria Ventura MD Last physical: Visit date not found Last MTM visit: Visit date not found         Next appt within 3 mo: Visit date not found  Next physical within 3 mo: Visit date not found  Prescriber OR PCP: Gilberto Bravo MD  Last diagnosis associated with med order: 1. Diabetes mellitus (H)  - glipiZIDE (GLUCOTROL XL) 10 MG 24 hr tablet [Pharmacy Med Name: glipiZIDE ER Oral Tablet Extended Release 24 Hour 10 MG]; TAKE TWO TABLETS BY MOUTH DAILY   Dispense: 180 tablet; Refill: 0  - metFORMIN (GLUCOPHAGE) 1000 MG tablet [Pharmacy Med Name: metFORMIN HCl Oral Tablet 1000 MG]; TAKE ONE TABLET BY MOUTH TWICE DAILY   Dispense: 180 tablet; Refill: 0    2. Hypertension  - lisinopriL (PRINIVIL,ZESTRIL) 10 MG tablet [Pharmacy Med Name: Lisinopril Oral Tablet 10 MG]; TAKE ONE TABLET BY MOUTH ONE TIME DAILY   Dispense: 90 tablet; Refill: 0    3. Type 2 diabetes mellitus with complication, without long-term current use of insulin (H)  - metFORMIN  (GLUCOPHAGE) 1000 MG tablet [Pharmacy Med Name: metFORMIN HCl Oral Tablet 1000 MG]; TAKE ONE TABLET BY MOUTH TWICE DAILY   Dispense: 180 tablet; Refill: 0     If protocol passes may refill for 12 months if within 3 months of last provider visit (or a total of 15 months).           Failed - A1C in last 6 months     Hemoglobin A1c   Date Value Ref Range Status   07/01/2019 5.9 3.5 - 6.0 % Final               Failed - Microalbumin in last year      Microalbumin, Random Urine   Date Value Ref Range Status   07/01/2019 9.41 (H) 0.00 - 1.99 mg/dL Final                  Failed - Serum creatinine in last year     Creatinine   Date Value Ref Range Status   04/15/2019 0.78 0.70 - 1.30 mg/dL Final             Passed - Blood pressure in last year     BP Readings from Last 1 Encounters:   12/27/19 128/76                lisinopriL (PRINIVIL,ZESTRIL) 10 MG tablet [Pharmacy Med Name: Lisinopril Oral Tablet 10 MG] 90 tablet 0     Sig: TAKE ONE TABLET BY MOUTH ONE TIME DAILY       Ace Inhibitors Refill Protocol Failed - 9/17/2020  9:19 AM        Failed - PCP or prescribing provider visit in past 12 months       Last office visit with prescriber/PCP: 9/9/2019 Gilberto Arnold MD OR same dept: Visit date not found OR same specialty: 12/27/2019 Andria Ventura MD  Last physical: 1/29/2019 Last MTM visit: Visit date not found   Next visit within 3 mo: Visit date not found  Next physical within 3 mo: Visit date not found  Prescriber OR PCP: Gilberto Bravo MD  Last diagnosis associated with med order: 1. Diabetes mellitus (H)  - glipiZIDE (GLUCOTROL XL) 10 MG 24 hr tablet [Pharmacy Med Name: glipiZIDE ER Oral Tablet Extended Release 24 Hour 10 MG]; TAKE TWO TABLETS BY MOUTH DAILY   Dispense: 180 tablet; Refill: 0  - metFORMIN (GLUCOPHAGE) 1000 MG tablet [Pharmacy Med Name: metFORMIN HCl Oral Tablet 1000 MG]; TAKE ONE TABLET BY MOUTH TWICE DAILY   Dispense: 180 tablet; Refill: 0    2. Hypertension  - lisinopriL  (PRINIVIL,ZESTRIL) 10 MG tablet [Pharmacy Med Name: Lisinopril Oral Tablet 10 MG]; TAKE ONE TABLET BY MOUTH ONE TIME DAILY   Dispense: 90 tablet; Refill: 0    3. Type 2 diabetes mellitus with complication, without long-term current use of insulin (H)  - metFORMIN (GLUCOPHAGE) 1000 MG tablet [Pharmacy Med Name: metFORMIN HCl Oral Tablet 1000 MG]; TAKE ONE TABLET BY MOUTH TWICE DAILY   Dispense: 180 tablet; Refill: 0    If protocol passes may refill for 12 months if within 3 months of last provider visit (or a total of 15 months).             Failed - Serum Potassium in past 12 months     No results found for: LN-POTASSIUM          Failed - Serum Creatinine in past 12 months     Creatinine   Date Value Ref Range Status   04/15/2019 0.78 0.70 - 1.30 mg/dL Final             Passed - Blood pressure filed in past 12 months     BP Readings from Last 1 Encounters:   12/27/19 128/76                metFORMIN (GLUCOPHAGE) 1000 MG tablet [Pharmacy Med Name: metFORMIN HCl Oral Tablet 1000 MG] 180 tablet 0     Sig: TAKE ONE TABLET BY MOUTH TWICE DAILY       Metformin Refill Protocol Failed - 9/17/2020  9:19 AM        Failed - LFT or AST or ALT in last 12 months     Albumin   Date Value Ref Range Status   04/15/2019 4.0 3.5 - 5.0 g/dL Final     Bilirubin, Total   Date Value Ref Range Status   04/15/2019 0.4 0.0 - 1.0 mg/dL Final     Bilirubin, Direct   Date Value Ref Range Status   03/04/2015 0.2 <=0.5 mg/dL Final     Alkaline Phosphatase   Date Value Ref Range Status   04/15/2019 61 45 - 120 U/L Final     AST   Date Value Ref Range Status   04/15/2019 22 0 - 40 U/L Final     ALT   Date Value Ref Range Status   04/15/2019 15 0 - 45 U/L Final     Protein, Total   Date Value Ref Range Status   04/15/2019 7.7 6.0 - 8.0 g/dL Final                Failed - GFR or Serum Creatinine in last 6 months     GFR MDRD Non Af Amer   Date Value Ref Range Status   04/15/2019 >60 >60 mL/min/1.73m2 Final     GFR MDRD Af Amer   Date Value Ref Range  Status   04/15/2019 >60 >60 mL/min/1.73m2 Final             Failed - Visit with PCP or prescribing provider visit in last 6 months or next 3 months     Last office visit with prescriber/PCP: Visit date not found OR same dept: Visit date not found OR same specialty: 12/27/2019 Andria Ventura MD Last physical: Visit date not found Last MTM visit: Visit date not found         Next appt within 3 mo: Visit date not found  Next physical within 3 mo: Visit date not found  Prescriber OR PCP: Gilberto Bravo MD  Last diagnosis associated with med order: 1. Diabetes mellitus (H)  - glipiZIDE (GLUCOTROL XL) 10 MG 24 hr tablet [Pharmacy Med Name: glipiZIDE ER Oral Tablet Extended Release 24 Hour 10 MG]; TAKE TWO TABLETS BY MOUTH DAILY   Dispense: 180 tablet; Refill: 0  - metFORMIN (GLUCOPHAGE) 1000 MG tablet [Pharmacy Med Name: metFORMIN HCl Oral Tablet 1000 MG]; TAKE ONE TABLET BY MOUTH TWICE DAILY   Dispense: 180 tablet; Refill: 0    2. Hypertension  - lisinopriL (PRINIVIL,ZESTRIL) 10 MG tablet [Pharmacy Med Name: Lisinopril Oral Tablet 10 MG]; TAKE ONE TABLET BY MOUTH ONE TIME DAILY   Dispense: 90 tablet; Refill: 0    3. Type 2 diabetes mellitus with complication, without long-term current use of insulin (H)  - metFORMIN (GLUCOPHAGE) 1000 MG tablet [Pharmacy Med Name: metFORMIN HCl Oral Tablet 1000 MG]; TAKE ONE TABLET BY MOUTH TWICE DAILY   Dispense: 180 tablet; Refill: 0     If protocol passes may refill for 12 months if within 3 months of last provider visit (or a total of 15 months).           Failed - A1C in last 6 months     Hemoglobin A1c   Date Value Ref Range Status   07/01/2019 5.9 3.5 - 6.0 % Final               Failed - Microalbumin in last year      Microalbumin, Random Urine   Date Value Ref Range Status   07/01/2019 9.41 (H) 0.00 - 1.99 mg/dL Final                  Passed - Blood pressure in last 12 months     BP Readings from Last 1 Encounters:   12/27/19 128/76

## 2021-06-11 NOTE — TELEPHONE ENCOUNTER
Message left for Bryn that he needs to call home monitor [facility] so that his new primary care physician gets his INR results and not  [facility] since we no longer manage his care.

## 2021-06-13 NOTE — TELEPHONE ENCOUNTER
RN cannot approve Refill Request    RN can NOT refill this medication Protocol failed and NO refill given. Last office visit: Visit date not found Last Physical: Visit date not found Last MTM visit: Visit date not found Last visit same specialty: 12/27/2019 Andria Ventura MD.  Next visit within 3 mo: Visit date not found  Next physical within 3 mo: Visit date not found      Sherita Gibson, [facility] Connection Triage/Med Refill 12/9/2020    Requested Prescriptions   Pending Prescriptions Disp Refills     glipiZIDE (GLUCOTROL XL) 10 MG 24 hr tablet [Pharmacy Med Name: glipiZIDE ER Oral Tablet Extended Release 24 Hour 10 MG] 180 tablet 0     Sig: TAKE TWO TABLETS BY MOUTH DAILY       Oral Hypoglycemics Refill Protocol Failed - 12/8/2020 12:00 PM        Failed - Visit with PCP or prescribing provider visit in last 6 months       Last office visit with prescriber/PCP: Visit date not found OR same dept: Visit date not found OR same specialty: 12/27/2019 Andria Ventura MD Last physical: Visit date not found Last MTM visit: Visit date not found         Next appt within 3 mo: Visit date not found  Next physical within 3 mo: Visit date not found  Prescriber OR PCP: Levi Thakkar MD  Last diagnosis associated with med order: 1. Diabetes mellitus (H)  - glipiZIDE (GLUCOTROL XL) 10 MG 24 hr tablet [Pharmacy Med Name: glipiZIDE ER Oral Tablet Extended Release 24 Hour 10 MG]; TAKE TWO TABLETS BY MOUTH DAILY   Dispense: 180 tablet; Refill: 0  - metFORMIN (GLUCOPHAGE) 1000 MG tablet [Pharmacy Med Name: metFORMIN HCl Oral Tablet 1000 MG]; TAKE ONE TABLET BY MOUTH TWICE DAILY   Dispense: 180 tablet; Refill: 0    2. Hypertension  - lisinopriL (PRINIVIL,ZESTRIL) 10 MG tablet [Pharmacy Med Name: Lisinopril Oral Tablet 10 MG]; TAKE ONE TABLET BY MOUTH ONE TIME DAILY   Dispense: 90 tablet; Refill: 0    3. Type 2 diabetes mellitus with complication, without long-term current use of insulin (H)  - metFORMIN (GLUCOPHAGE) 1000 MG tablet  [Pharmacy Med Name: metFORMIN HCl Oral Tablet 1000 MG]; TAKE ONE TABLET BY MOUTH TWICE DAILY   Dispense: 180 tablet; Refill: 0     If protocol passes may refill for 12 months if within 3 months of last provider visit (or a total of 15 months).           Failed - A1C in last 6 months     Hemoglobin A1c   Date Value Ref Range Status   07/01/2019 5.9 3.5 - 6.0 % Final               Failed - Microalbumin in last year      Microalbumin, Random Urine   Date Value Ref Range Status   07/01/2019 9.41 (H) 0.00 - 1.99 mg/dL Final                  Failed - Serum creatinine in last year     Creatinine   Date Value Ref Range Status   04/15/2019 0.78 0.70 - 1.30 mg/dL Final             Passed - Blood pressure in last year     BP Readings from Last 1 Encounters:   12/27/19 128/76                lisinopriL (PRINIVIL,ZESTRIL) 10 MG tablet [Pharmacy Med Name: Lisinopril Oral Tablet 10 MG] 90 tablet 0     Sig: TAKE ONE TABLET BY MOUTH ONE TIME DAILY       Ace Inhibitors Refill Protocol Failed - 12/8/2020 12:00 PM        Failed - PCP or prescribing provider visit in past 12 months       Last office visit with prescriber/PCP: Visit date not found OR same dept: Visit date not found OR same specialty: 12/27/2019 Andria Ventura MD  Last physical: Visit date not found Last MTM visit: Visit date not found   Next visit within 3 mo: Visit date not found  Next physical within 3 mo: Visit date not found  Prescriber OR PCP: Levi Thakkar MD  Last diagnosis associated with med order: 1. Diabetes mellitus (H)  - glipiZIDE (GLUCOTROL XL) 10 MG 24 hr tablet [Pharmacy Med Name: glipiZIDE ER Oral Tablet Extended Release 24 Hour 10 MG]; TAKE TWO TABLETS BY MOUTH DAILY   Dispense: 180 tablet; Refill: 0  - metFORMIN (GLUCOPHAGE) 1000 MG tablet [Pharmacy Med Name: metFORMIN HCl Oral Tablet 1000 MG]; TAKE ONE TABLET BY MOUTH TWICE DAILY   Dispense: 180 tablet; Refill: 0    2. Hypertension  - lisinopriL (PRINIVIL,ZESTRIL) 10 MG tablet [Pharmacy Med Name:  Lisinopril Oral Tablet 10 MG]; TAKE ONE TABLET BY MOUTH ONE TIME DAILY   Dispense: 90 tablet; Refill: 0    3. Type 2 diabetes mellitus with complication, without long-term current use of insulin (H)  - metFORMIN (GLUCOPHAGE) 1000 MG tablet [Pharmacy Med Name: metFORMIN HCl Oral Tablet 1000 MG]; TAKE ONE TABLET BY MOUTH TWICE DAILY   Dispense: 180 tablet; Refill: 0    If protocol passes may refill for 12 months if within 3 months of last provider visit (or a total of 15 months).             Failed - Serum Potassium in past 12 months     No results found for: LN-POTASSIUM          Failed - Serum Creatinine in past 12 months     Creatinine   Date Value Ref Range Status   04/15/2019 0.78 0.70 - 1.30 mg/dL Final             Passed - Blood pressure filed in past 12 months     BP Readings from Last 1 Encounters:   12/27/19 128/76                metFORMIN (GLUCOPHAGE) 1000 MG tablet [Pharmacy Med Name: metFORMIN HCl Oral Tablet 1000 MG] 180 tablet 0     Sig: TAKE ONE TABLET BY MOUTH TWICE DAILY       Metformin Refill Protocol Failed - 12/8/2020 12:00 PM        Failed - LFT or AST or ALT in last 12 months     Albumin   Date Value Ref Range Status   04/15/2019 4.0 3.5 - 5.0 g/dL Final     Bilirubin, Total   Date Value Ref Range Status   04/15/2019 0.4 0.0 - 1.0 mg/dL Final     Bilirubin, Direct   Date Value Ref Range Status   03/04/2015 0.2 <=0.5 mg/dL Final     Alkaline Phosphatase   Date Value Ref Range Status   04/15/2019 61 45 - 120 U/L Final     AST   Date Value Ref Range Status   04/15/2019 22 0 - 40 U/L Final     ALT   Date Value Ref Range Status   04/15/2019 15 0 - 45 U/L Final     Protein, Total   Date Value Ref Range Status   04/15/2019 7.7 6.0 - 8.0 g/dL Final                Failed - GFR or Serum Creatinine in last 6 months     GFR MDRD Non Af Amer   Date Value Ref Range Status   04/15/2019 >60 >60 mL/min/1.73m2 Final     GFR MDRD Af Amer   Date Value Ref Range Status   04/15/2019 >60 >60 mL/min/1.73m2 Final              Failed - Visit with PCP or prescribing provider visit in last 6 months or next 3 months     Last office visit with prescriber/PCP: Visit date not found OR same dept: Visit date not found OR same specialty: 12/27/2019 Andria Ventura MD Last physical: Visit date not found Last MTM visit: Visit date not found         Next appt within 3 mo: Visit date not found  Next physical within 3 mo: Visit date not found  Prescriber OR PCP: Levi Thakkar MD  Last diagnosis associated with med order: 1. Diabetes mellitus (H)  - glipiZIDE (GLUCOTROL XL) 10 MG 24 hr tablet [Pharmacy Med Name: glipiZIDE ER Oral Tablet Extended Release 24 Hour 10 MG]; TAKE TWO TABLETS BY MOUTH DAILY   Dispense: 180 tablet; Refill: 0  - metFORMIN (GLUCOPHAGE) 1000 MG tablet [Pharmacy Med Name: metFORMIN HCl Oral Tablet 1000 MG]; TAKE ONE TABLET BY MOUTH TWICE DAILY   Dispense: 180 tablet; Refill: 0    2. Hypertension  - lisinopriL (PRINIVIL,ZESTRIL) 10 MG tablet [Pharmacy Med Name: Lisinopril Oral Tablet 10 MG]; TAKE ONE TABLET BY MOUTH ONE TIME DAILY   Dispense: 90 tablet; Refill: 0    3. Type 2 diabetes mellitus with complication, without long-term current use of insulin (H)  - metFORMIN (GLUCOPHAGE) 1000 MG tablet [Pharmacy Med Name: metFORMIN HCl Oral Tablet 1000 MG]; TAKE ONE TABLET BY MOUTH TWICE DAILY   Dispense: 180 tablet; Refill: 0     If protocol passes may refill for 12 months if within 3 months of last provider visit (or a total of 15 months).           Failed - A1C in last 6 months     Hemoglobin A1c   Date Value Ref Range Status   07/01/2019 5.9 3.5 - 6.0 % Final               Failed - Microalbumin in last year      Microalbumin, Random Urine   Date Value Ref Range Status   07/01/2019 9.41 (H) 0.00 - 1.99 mg/dL Final                  Passed - Blood pressure in last 12 months     BP Readings from Last 1 Encounters:   12/27/19 128/76
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
## 2021-06-15 NOTE — TELEPHONE ENCOUNTER
RN cannot approve Refill Request    RN can NOT refill this medication PCP messaged that patient is overdue for Labs. Last office visit: 9/9/2019 Jodie Bravo, Gilberto Prieto MD Last Physical: 1/29/2019 Last MTM visit: Visit date not found Last visit same specialty: 12/27/2019 Andria Ventura MD.  Next visit within 3 mo: Visit date not found  Next physical within 3 mo: Visit date not found      Joie Glover, [facility] Triage/Med Refill 3/3/2021    Requested Prescriptions   Pending Prescriptions Disp Refills     metFORMIN (GLUCOPHAGE) 1000 MG tablet [Pharmacy Med Name: metFORMIN HCl Oral Tablet 1000 MG] 60 tablet 0     Sig: TAKE ONE TABLET BY MOUTH TWICE DAILY. NEED APPOINTMENT PRIOR TO NEXT REFILL       Metformin Refill Protocol Failed - 3/3/2021  9:40 AM        Failed - Blood pressure in last 12 months     BP Readings from Last 1 Encounters:   12/27/19 128/76             Failed - LFT or AST or ALT in last 12 months     Albumin   Date Value Ref Range Status   04/15/2019 4.0 3.5 - 5.0 g/dL Final     Bilirubin, Total   Date Value Ref Range Status   04/15/2019 0.4 0.0 - 1.0 mg/dL Final     Bilirubin, Direct   Date Value Ref Range Status   03/04/2015 0.2 <=0.5 mg/dL Final     Alkaline Phosphatase   Date Value Ref Range Status   04/15/2019 61 45 - 120 U/L Final     AST   Date Value Ref Range Status   04/15/2019 22 0 - 40 U/L Final     ALT   Date Value Ref Range Status   04/15/2019 15 0 - 45 U/L Final     Protein, Total   Date Value Ref Range Status   04/15/2019 7.7 6.0 - 8.0 g/dL Final                Failed - GFR or Serum Creatinine in last 6 months     GFR MDRD Non Af Amer   Date Value Ref Range Status   04/15/2019 >60 >60 mL/min/1.73m2 Final     GFR MDRD Af Amer   Date Value Ref Range Status   04/15/2019 >60 >60 mL/min/1.73m2 Final             Failed - Visit with PCP or prescribing provider visit in last 6 months or next 3 months     Last office visit with prescriber/PCP: Visit date not found OR same dept:  Visit date not found OR same specialty: 12/27/2019 Andria Ventura MD Last physical: Visit date not found Last MTM visit: Visit date not found         Next appt within 3 mo: Visit date not found  Next physical within 3 mo: Visit date not found  Prescriber OR PCP: Gilberto Bravo MD  Last diagnosis associated with med order: 1. Diabetes mellitus (H)  - metFORMIN (GLUCOPHAGE) 1000 MG tablet [Pharmacy Med Name: metFORMIN HCl Oral Tablet 1000 MG]; TAKE ONE TABLET BY MOUTH TWICE DAILY. NEED APPOINTMENT PRIOR TO NEXT REFILL  Dispense: 60 tablet; Refill: 0  - glipiZIDE (GLUCOTROL XL) 10 MG 24 hr tablet [Pharmacy Med Name: glipiZIDE ER Oral Tablet Extended Release 24 Hour 10 MG]; TAKE TWO TABLETS BY MOUTH DAILY. NEED APPOINTMENT PRIOR TO NEXT REFILL  Dispense: 60 tablet; Refill: 0    2. Type 2 diabetes mellitus with complication, without long-term current use of insulin (H)  - metFORMIN (GLUCOPHAGE) 1000 MG tablet [Pharmacy Med Name: metFORMIN HCl Oral Tablet 1000 MG]; TAKE ONE TABLET BY MOUTH TWICE DAILY. NEED APPOINTMENT PRIOR TO NEXT REFILL  Dispense: 60 tablet; Refill: 0    3. Hypertension  - lisinopriL (PRINIVIL,ZESTRIL) 10 MG tablet [Pharmacy Med Name: Lisinopril Oral Tablet 10 MG]; TAKE ONE TABLET BY MOUTH ONE TIME DAILY   Dispense: 30 tablet; Refill: 0    4. Anxiety and depression  - citalopram (CELEXA) 20 MG tablet [Pharmacy Med Name: Citalopram Hydrobromide Oral Tablet 20 MG]; TAKE ONE TABLET BY MOUTH ONE TIME DAILY   Dispense: 90 tablet; Refill: 0     If protocol passes may refill for 12 months if within 3 months of last provider visit (or a total of 15 months).           Failed - A1C in last 6 months     Hemoglobin A1c   Date Value Ref Range Status   07/01/2019 5.9 3.5 - 6.0 % Final               Failed - Microalbumin in last year      Microalbumin, Random Urine   Date Value Ref Range Status   07/01/2019 9.41 (H) 0.00 - 1.99 mg/dL Final                     glipiZIDE (GLUCOTROL XL) 10 MG 24 hr  tablet [Pharmacy Med Name: glipiZIDE ER Oral Tablet Extended Release 24 Hour 10 MG] 60 tablet 0     Sig: TAKE TWO TABLETS BY MOUTH DAILY. NEED APPOINTMENT PRIOR TO NEXT REFILL       Oral Hypoglycemics Refill Protocol Failed - 3/3/2021  9:40 AM        Failed - Visit with PCP or prescribing provider visit in last 6 months       Last office visit with prescriber/PCP: Visit date not found OR same dept: Visit date not found OR same specialty: 12/27/2019 Andria Ventura MD Last physical: Visit date not found Last MTM visit: Visit date not found         Next appt within 3 mo: Visit date not found  Next physical within 3 mo: Visit date not found  Prescriber OR PCP: Gilberto Bravo MD  Last diagnosis associated with med order: 1. Diabetes mellitus (H)  - metFORMIN (GLUCOPHAGE) 1000 MG tablet [Pharmacy Med Name: metFORMIN HCl Oral Tablet 1000 MG]; TAKE ONE TABLET BY MOUTH TWICE DAILY. NEED APPOINTMENT PRIOR TO NEXT REFILL  Dispense: 60 tablet; Refill: 0  - glipiZIDE (GLUCOTROL XL) 10 MG 24 hr tablet [Pharmacy Med Name: glipiZIDE ER Oral Tablet Extended Release 24 Hour 10 MG]; TAKE TWO TABLETS BY MOUTH DAILY. NEED APPOINTMENT PRIOR TO NEXT REFILL  Dispense: 60 tablet; Refill: 0    2. Type 2 diabetes mellitus with complication, without long-term current use of insulin (H)  - metFORMIN (GLUCOPHAGE) 1000 MG tablet [Pharmacy Med Name: metFORMIN HCl Oral Tablet 1000 MG]; TAKE ONE TABLET BY MOUTH TWICE DAILY. NEED APPOINTMENT PRIOR TO NEXT REFILL  Dispense: 60 tablet; Refill: 0    3. Hypertension  - lisinopriL (PRINIVIL,ZESTRIL) 10 MG tablet [Pharmacy Med Name: Lisinopril Oral Tablet 10 MG]; TAKE ONE TABLET BY MOUTH ONE TIME DAILY   Dispense: 30 tablet; Refill: 0    4. Anxiety and depression  - citalopram (CELEXA) 20 MG tablet [Pharmacy Med Name: Citalopram Hydrobromide Oral Tablet 20 MG]; TAKE ONE TABLET BY MOUTH ONE TIME DAILY   Dispense: 90 tablet; Refill: 0     If protocol passes may refill for 12 months if  within 3 months of last provider visit (or a total of 15 months).           Failed - A1C in last 6 months     Hemoglobin A1c   Date Value Ref Range Status   07/01/2019 5.9 3.5 - 6.0 % Final               Failed - Microalbumin in last year      Microalbumin, Random Urine   Date Value Ref Range Status   07/01/2019 9.41 (H) 0.00 - 1.99 mg/dL Final                  Failed - Blood pressure in last year     BP Readings from Last 1 Encounters:   12/27/19 128/76             Failed - Serum creatinine in last year     Creatinine   Date Value Ref Range Status   04/15/2019 0.78 0.70 - 1.30 mg/dL Final                lisinopriL (PRINIVIL,ZESTRIL) 10 MG tablet [Pharmacy Med Name: Lisinopril Oral Tablet 10 MG] 30 tablet 0     Sig: TAKE ONE TABLET BY MOUTH ONE TIME DAILY       Ace Inhibitors Refill Protocol Failed - 3/3/2021  9:40 AM        Failed - PCP or prescribing provider visit in past 12 months       Last office visit with prescriber/PCP: 9/9/2019 Gilberto Arnold MD OR same dept: Visit date not found OR same specialty: 12/27/2019 Andria Ventura MD  Last physical: 1/29/2019 Last MTM visit: Visit date not found   Next visit within 3 mo: Visit date not found  Next physical within 3 mo: Visit date not found  Prescriber OR PCP: Gilberto Bravo MD  Last diagnosis associated with med order: 1. Diabetes mellitus (H)  - metFORMIN (GLUCOPHAGE) 1000 MG tablet [Pharmacy Med Name: metFORMIN HCl Oral Tablet 1000 MG]; TAKE ONE TABLET BY MOUTH TWICE DAILY. NEED APPOINTMENT PRIOR TO NEXT REFILL  Dispense: 60 tablet; Refill: 0  - glipiZIDE (GLUCOTROL XL) 10 MG 24 hr tablet [Pharmacy Med Name: glipiZIDE ER Oral Tablet Extended Release 24 Hour 10 MG]; TAKE TWO TABLETS BY MOUTH DAILY. NEED APPOINTMENT PRIOR TO NEXT REFILL  Dispense: 60 tablet; Refill: 0    2. Type 2 diabetes mellitus with complication, without long-term current use of insulin (H)  - metFORMIN (GLUCOPHAGE) 1000 MG tablet [Pharmacy Med Name: metFORMIN  HCl Oral Tablet 1000 MG]; TAKE ONE TABLET BY MOUTH TWICE DAILY. NEED APPOINTMENT PRIOR TO NEXT REFILL  Dispense: 60 tablet; Refill: 0    3. Hypertension  - lisinopriL (PRINIVIL,ZESTRIL) 10 MG tablet [Pharmacy Med Name: Lisinopril Oral Tablet 10 MG]; TAKE ONE TABLET BY MOUTH ONE TIME DAILY   Dispense: 30 tablet; Refill: 0    4. Anxiety and depression  - citalopram (CELEXA) 20 MG tablet [Pharmacy Med Name: Citalopram Hydrobromide Oral Tablet 20 MG]; TAKE ONE TABLET BY MOUTH ONE TIME DAILY   Dispense: 90 tablet; Refill: 0    If protocol passes may refill for 12 months if within 3 months of last provider visit (or a total of 15 months).             Failed - Serum Potassium in past 12 months     No results found for: LN-POTASSIUM          Failed - Blood pressure filed in past 12 months     BP Readings from Last 1 Encounters:   12/27/19 128/76             Failed - Serum Creatinine in past 12 months     Creatinine   Date Value Ref Range Status   04/15/2019 0.78 0.70 - 1.30 mg/dL Final                citalopram (CELEXA) 20 MG tablet [Pharmacy Med Name: Citalopram Hydrobromide Oral Tablet 20 MG] 90 tablet 0     Sig: TAKE ONE TABLET BY MOUTH ONE TIME DAILY       SSRI Refill Protocol  Failed - 3/3/2021  9:40 AM        Failed - PCP or prescribing provider visit in last year     Last office visit with prescriber/PCP: 9/9/2019 Gilberto Arnold MD OR same dept: Visit date not found OR same specialty: 12/27/2019 Andria Ventura MD  Last physical: 1/29/2019 Last MTM visit: Visit date not found   Next visit within 3 mo: Visit date not found  Next physical within 3 mo: Visit date not found  Prescriber OR PCP: Gilberto Bravo MD  Last diagnosis associated with med order: 1. Diabetes mellitus (H)  - metFORMIN (GLUCOPHAGE) 1000 MG tablet [Pharmacy Med Name: metFORMIN HCl Oral Tablet 1000 MG]; TAKE ONE TABLET BY MOUTH TWICE DAILY. NEED APPOINTMENT PRIOR TO NEXT REFILL  Dispense: 60 tablet; Refill: 0  -  glipiZIDE (GLUCOTROL XL) 10 MG 24 hr tablet [Pharmacy Med Name: glipiZIDE ER Oral Tablet Extended Release 24 Hour 10 MG]; TAKE TWO TABLETS BY MOUTH DAILY. NEED APPOINTMENT PRIOR TO NEXT REFILL  Dispense: 60 tablet; Refill: 0    2. Type 2 diabetes mellitus with complication, without long-term current use of insulin (H)  - metFORMIN (GLUCOPHAGE) 1000 MG tablet [Pharmacy Med Name: metFORMIN HCl Oral Tablet 1000 MG]; TAKE ONE TABLET BY MOUTH TWICE DAILY. NEED APPOINTMENT PRIOR TO NEXT REFILL  Dispense: 60 tablet; Refill: 0    3. Hypertension  - lisinopriL (PRINIVIL,ZESTRIL) 10 MG tablet [Pharmacy Med Name: Lisinopril Oral Tablet 10 MG]; TAKE ONE TABLET BY MOUTH ONE TIME DAILY   Dispense: 30 tablet; Refill: 0    4. Anxiety and depression  - citalopram (CELEXA) 20 MG tablet [Pharmacy Med Name: Citalopram Hydrobromide Oral Tablet 20 MG]; TAKE ONE TABLET BY MOUTH ONE TIME DAILY   Dispense: 90 tablet; Refill: 0    If protocol passes may refill for 12 months if within 3 months of last provider visit (or a total of 15 months).
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
## 2021-06-16 PROBLEM — K70.30 ALCOHOLIC CIRRHOSIS OF LIVER WITHOUT ASCITES (H): Status: ACTIVE | Noted: 2019-04-18

## 2021-06-16 PROBLEM — F41.9 ANXIETY: Status: ACTIVE | Noted: 2018-05-07

## 2021-06-16 PROBLEM — E66.01 MORBID OBESITY (H): Status: ACTIVE | Noted: 2019-04-18

## 2021-06-16 NOTE — TELEPHONE ENCOUNTER
Telephone Encounter by Misty Chino RN at 6/12/2019  8:51 AM     Author: Misty Chino RN Service: -- Author Type: Registered Nurse    Filed: 6/12/2019  9:30 AM Encounter Date: 6/11/2019 Status: Signed    : Misty Chino RN (Registered Nurse)       Medication being requested: Neurontin  Last visit date: 5/10  Provider: DOV  Next visit date: 6/20  Provider: DOV  Expected follow up: 4 weeks  MTM visit ([facility]) date: ruben  UDT date: 2/28/19  Agreement date: ruben   snipped in:    Pertinent between visit information about requested medication (telephone, mychart, prior authorization, concerns, comments): Bridge script to next appointment 6/20  Script being sent to provider by nurse- dates and quantity:   Requested Prescriptions     Pending Prescriptions Disp Refills   ? gabapentin (NEURONTIN) 300 MG capsule [Pharmacy Med Name: Gabapentin Oral Capsule 300 MG] 54 capsule 0     Sig: TAKE 1 CAPSULE BY MOUTH THREE TIMES DAILY AND 3 CAPUSLES AT BEDTIME      Pharmacy cued: [facility]  Standing orders for withdrawal protocol implemented: ruben

## 2021-06-16 NOTE — TELEPHONE ENCOUNTER
Telephone Encounter by Misty Chino RN at 4/30/2019  8:58 AM     Author: Misty Chino RN Service: -- Author Type: Registered Nurse    Filed: 4/30/2019  9:16 AM Encounter Date: 4/27/2019 Status: Signed    : Misty Chino RN (Registered Nurse)       Medication being requested: Gabapentin last filled 4/13  Last visit date: 4/2.  Provider: DOV  Next visit date: 5/10.  Provider: DOV  Expected follow up: 4 weeks  MTM visit ([facility]) date: na  UDT date: 2/28  Agreement date: unable to find, appears [facility] has not taken over prescribing of narcotics   snipped in:      Red Flags/Comments identified on call: Called patient to ask about changes with increased dose of Gabapentin, (increased from three times a day to four times a day. Patient reports that he has not noticed a change in his pain with the new dose and he also is having no side effects with this new dose.  Pertinent between visit information about requested medication (telephone, mychart, prior authorization): Increased dose at last visit on 4/2  Script being sent to provider by nurse- dates and quantity:   Requested Prescriptions     Pending Prescriptions Disp Refills   ? gabapentin (NEURONTIN) 300 MG capsule [Pharmacy Med Name: Gabapentin Oral Capsule 300 MG] 60 capsule 0     Sig: TAKE 1 CAPSULE BY MOUTH 4 TIMES DAILY FOR 15 DAYS     Pharmacy cued: [facility]-verified with patient  Standing orders for withdrawal protocol implemented: ruben

## 2021-06-16 NOTE — TELEPHONE ENCOUNTER
Telephone Encounter by Sarah Sommer CNP at 1/27/2020 12:01 PM     Author: Sarah Sommer CNP Service: -- Author Type: Nurse Practitioner    Filed: 1/27/2020 12:11 PM Encounter Date: 1/23/2020 Status: Signed    : Sarah Sommer CNP (Nurse Practitioner)       Medication being requested: gabapentin  Last visit date: 9/3/19.  Provider: carmelo  Next visit date: none.  Provider:   Expected follow up: 6-8 weeks  MTM visit ([facility]) date: na  UDT 02/28/2019   snipped in:    Pertinent between visit information about requested medication (telephone, mychart, prior authorization, concerns, comments): per RN review  Script being sent to provider by nurse- dates and quantity: no  Pharmacy cued: no  Standing orders for withdrawal protocol implemented: no        It appears the pharmacy has not changed. I am not clear if he has been getting medication mailed to him. I am willing to accommodate for 1 month - please see clarification. If he is coming to the [facility] for his medication he should get a follow up appointment with me since he has not established up [facility]. Otherwise he has 1 refill to get I with a provider. Thank you

## 2021-06-17 NOTE — PATIENT INSTRUCTIONS - HE
Patient Instructions by Jeannette Aguirre RN at 7/31/2019 11:20 AM     Author: Jeannette Aguirre RN Service: -- Author Type: Registered Nurse    Filed: 7/31/2019 11:07 AM Encounter Date: 7/31/2019 Status: Addendum    : Nicki Brady MD (Physician)    Related Notes: Original Note by Jeannette Aguirre RN (Registered Nurse) filed at 7/31/2019 11:06 AM       Ankle-Brachial Index (AGUSTÍN) or Physiologic Test    Description  An ankle-brachial index test is relatively pain free. Blood pressure cuffs of various sizes are placed on your thigh, calf, foot and toes.  Similar to having your blood pressure checked with an arm cuff, as the technician inflates the cuffs, they progressively tighten and are then quickly released.  You may feel some discomfort, but generally for less than 60 seconds for each measurement. You will be asked to remove your socks and shoes and possibly your pants or shorts. Gowns will be provided. It usually takes about 30-60 minutes.   Depending on the initial readings and patient symptoms, you may be asked to perform a light walk on a treadmill.  The technician will apply ultrasound gel, usually warmed for your comfort, to your ankles and wrists. Through the gel, the technician will use a small hand-held device that emits sound waves.  Risks  There are typically no side effects or complications associated with a physiologic study.  How to Prepare  Eat and take medications as usual.  There is no preparation required for an ankle-brachial index (AGUSTÍN) or physiologic exam.  What Can I Expect After the Test?  The technician will send the ultrasound images to your vascular surgeon for evaluation. Typically, a report is available in 2-3 days. If anything critical is found, it is standard practice to notify the vascular surgeon immediately.  Reference: https://vascular.org/patient-resources/vascular-tests    Lower Extremity Arterial Ultrasound    Description  Ultrasound examinations are painless and easy for  the patient. The vascular laboratory will contain a bed and just two or three pieces of equipment. You will be asked to remove pants or shorts and gowns will be provided. It usually takes about 30 minutes.  The technician will tuck a towel under your underpants in the groin. The gel is water-soluble and will not stain your skin or clothes.  Ultrasound gel, usually warmed for your comfort, will be placed on the inner side of your legs.    Through the gel, the technician will apply to your legs a small hand-held device that emits sound waves.  When the test is completed, the technician will remove excess gel from your legs.    Risks  There are typically no side effects or complications associated with a lower extremity arterial duplex ultrasound.  How to Prepare  Eat and take medications as usual.  There is no preparation required for a lower extremity arterial duplex ultrasound.  What Can I Expect After the Test?  The technician will send the ultrasound images to your vascular surgeon for evaluation. Typically, a report is available in 2-3 days. If anything critical is found, it is standard practice to notify the vascular surgeon immediately.  Reference: https://vascular.org/patient-resources/vascular-tests

## 2021-06-17 NOTE — PROGRESS NOTES
ASSESSMENT/PLAN:    DM (diabetes mellitus)  Lab Results   Component Value Date    HGBA1C 6.7 (H) 05/07/2018     Continue with metformin 1000 mg twice daily, Actos 30 mg, glipizide XL 20 mg  -     Glycosylated Hemoglobin A1c    Hypertension  Good control on lisinopril.  Not sure if he is taking 10mg or 20mg; he'll call with the correct dose    Mixed hyperlipidemia  Simvastatin 80mg    Neck pain  Management per [facility]  Prn tramadol for pain  Will add gabapentin TID and prn tizanidine  -     gabapentin (NEURONTIN) 100 MG capsule; Take 100 mg by mouth 3 (three) times a day.  Dispense: 90 capsule; Refill: 0  -     tiZANidine (ZANAFLEX) 4 MG tablet; Take 1 tablet (4 mg total) by mouth every 8 (eight) hours as needed.  Dispense: 30 tablet; Refill: 0    Cannabis abuse for chronic pain  [place] him in [place] cannabis program   Stop buying from outside sources    ETOH use  Advised cessation   The addition of gabapentin may help with cessation    Major depressive disorder, Anxiety  Stable on celexa 20mg    History of DVT (deep vein thrombosis)  On warfarin    Mild intermittent asthma without complication  ACT=23 on July 2017    B12 deficiency  He has had difficulty with getting monthly B12 injections in the clinic.  Will give him B12 intramuscular today and then send daily oral B12 supplement  -     Vitamin B12  -     cyanocobalamin injection 1,000 mcg; Inject 1 mL (1,000 mcg total) into the shoulder, thigh, or buttocks once.  -     cyanocobalamin 1000 MCG tablet; Take 1 tablet (1,000 mcg total) by mouth daily.  Dispense: 90 tablet; Refill: 3      Orders Placed This Encounter   Procedures     Glycosylated Hemoglobin A1c     JI RED     Vitamin B12     Administrations This Visit     cyanocobalamin injection 1,000 mcg     Admin Date Action Dose Route Administered By             05/07/2018 Given 1000 mcg Intramuscular Kike Hart CMA                            CHIEF COMPLAINT:  Chief Complaint   Patient  presents with     med check     need B12     Medication Refill       HISTORY OF PRESENT ILLNESS:  Brannon is a 58 y.o. male presenting to the clinic today for a follow up for diabetes. His A1c is 6.7% today. He is taking metformin 2000mg, Actos 30mg and glipizide XL 20mg daily for the diabetes. He checks his sugars a few times per week, and the fasting sugars are normally in the 120s, and random sugars are in the 160s. He admits that he has been drinking more alcohol recently; drinking about a 6 pack of beer per day.     Hypertension: His blood pressure is stable today at 116/74. He is currently taking lisinopril daily for the blood pressure, but he is unsure of his lisinopril dose.     Dyslipidemia: He is currently taking simvastatin 80mg daily.     Depression/Anxiety: He is currently taking citalopram 20mg daily. He feels like he has been having some more depressive moods, but attributes this to not being able to sleep because of the neck. He feels his medication is at a good dose. He is not interested in seeing a therapist.     Neck Pain: He has been having neck pain. He has been seeing a orthopedic neck specialist, and has had one injection in the neck that did not give him much relief. He is planning on following up with the orthopedist soon. He is having a hard time turning his head because of the neck pain.     REVIEW OF SYSTEMS:   Constitutional: Negative.   HENT: Negative.   Eyes: Negative.   Respiratory: Negative.   Cardiovascular: Negative.   Gastrointestinal: Negative.   Endocrine: Negative.   Genitourinary: Negative.   Skin: Negative.   Allergic/Immunologic: Negative.   Neurological: Negative.   Hematological: Negative.   Psychiatric/Behavioral: Negative.   All other systems are negative.    PFSH:  No new history.     TOBACCO USE:  History   Smoking Status     Never Smoker   Smokeless Tobacco     Never Used       VITALS:  Vitals:    05/07/18 1122   BP: 116/74   Pulse: 92   Weight: (!) 266 lb (120.7 kg)      Wt Readings from Last 3 Encounters:   05/07/18 (!) 266 lb (120.7 kg)   01/31/18 (!) 263 lb (119.3 kg)   01/27/18 (!) 250 lb (113.4 kg)       PHYSICAL EXAM:  Constitutional: Patient is oriented to person, place, and time. Patient appears well-developed and well-nourished. No distress.   Cardiovascular: Normal rate.  Pulmonary/Chest: Effort normal. No respiratory distress.   Neurological: Patient is alert and oriented to person, place, and time.      Results for orders placed or performed in visit on 05/07/18   Glycosylated Hemoglobin A1c   Result Value Ref Range    Hemoglobin A1c 6.7 (H) 3.5 - 6.1 %         ADDITIONAL HISTORY SUMMARIZED (2): None.  DECISION TO OBTAIN EXTRA INFORMATION (1): None.   RADIOLOGY TESTS (1): None.  LABS (1): Ordered labs today.  MEDICINE TESTS (1): None.  INDEPENDENT REVIEW (2 each): None.     The visit lasted a total of 25 minutes face to face with the patient. Over 50% of the time was spent counseling and educating the patient about managing his chronic conditions.    I, April Becerra, am scribing for and in the presence of, Dr. Feliciano.    I, Gilberto Bravo MD , personally performed the services described in this documentation, as scribed by April Becerra in my presence, and it is both accurate and complete.    MEDICATIONS:  Current Outpatient Prescriptions   Medication Sig Dispense Refill     ACCU-CHEK FASTCLIX USE AS DIRECTED 102 each 3     ACCU-CHEK JOSELIN Misc TEST BLOOD SUGAR EVERY DAY 1 each 0     ACCU-CHEK SMARTVIEW TEST STRIP strips USE AS DIRECTED 100 strip 3     albuterol (PROVENTIL HFA;VENTOLIN HFA) 90 mcg/actuation inhaler Inhale 2 puffs every 4 (four) hours as needed.       blood sugar diagnostic (GLUCOSE BLOOD) Strp Use As Directed. Use as directed       citalopram (CELEXA) 20 MG tablet TAKE 1 TABLET EVERY DAY 90 tablet 1     CONTOUR TEST STRIPS strips Use 1 each As Directed 3 (three) times a day. 100 each 11     cyanocobalamin 1,000 mcg/mL  injection Inject 1,000 mcg into the shoulder, thigh, or buttocks every 30 (thirty) days.       EPINEPHrine (EPIPEN) 0.3 mg/0.3 mL (1:1,000) atIn Inject 0.3 mL (0.3 mg total) into the shoulder, thigh, or buttocks as needed. 3 Device 3     glipiZIDE (GLUCOTROL XL) 10 MG 24 hr tablet TAKE TWO TABLETS (=20 MG TOTAL) BY MOUTH IN THE MORNING 180 tablet 0     LANCETS MISC Use As Directed 2 (two) times a day. Use with ros contour twice daily       lisinopril (PRINIVIL,ZESTRIL) 10 MG tablet TAKE 1 TABLET EVERY DAY 90 tablet 2     metFORMIN (GLUCOPHAGE) 1000 MG tablet TAKE 1 TABLET TWICE DAILY 180 tablet 1     pioglitazone (ACTOS) 30 MG tablet Take 1 tablet (30 mg total) by mouth daily. 90 tablet 1     simvastatin (ZOCOR) 80 MG tablet TAKE 1 TABLET AT BEDTIME 90 tablet 2     traMADol (ULTRAM) 50 mg tablet TAKE 1 TABLET BY MOUTH EVERY 6 HOURS AS NEEDED FOR PAIN 30 tablet 0     warfarin (COUMADIN) 2.5 MG tablet Take 1 1/2 tablets (3.75 mg) by mouth daily as directed. Adjust dose per INR results. 135 tablet 1     cyanocobalamin 1000 MCG tablet Take 1 tablet (1,000 mcg total) by mouth daily. 90 tablet 3     gabapentin (NEURONTIN) 100 MG capsule Take 100 mg by mouth 3 (three) times a day. 90 capsule 0     tiZANidine (ZANAFLEX) 4 MG tablet Take 1 tablet (4 mg total) by mouth every 8 (eight) hours as needed. 30 tablet 0     No current facility-administered medications for this visit.        Total data points: 1

## 2021-06-18 NOTE — PROGRESS NOTES
NEUROSURGERY CONSULTATION NOTE    6/19/2018     Brannon Robb is a 58 y.o. male who is sent to us in consultation by Gilberto Arnold*  (I operated on one of his friends)   for evaluation of his neck and shoulder pain on his left side.  This has been going on since July when he was changing a tire on his truck (lug nut stuck, got a hernia and shoulder pain).  Failed conservative therapy [facility] who did injection in neck.  Can't sleep at night.  Hurts turning his head to the right.  Sometimes goes to the  Upper arm.  If he sleeps on his left side his whole hand all fingers goes numb and up his arm.  Similar to CTS fixed 20 years ago.          HPI: On warfarin for his PVD .      Has had arterial by pass and burn on his R leg month ago with non-healing wound.      Used to be on B-12 injections now taking pills.    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)      Hyperlipidemia      Hypertension      Insomnia      Microalbuminuria      Peripheral vascular disease (H)      Rectal prolapse      Rotator cuff tendonitis      Sleep apnea     Doesnt use CPAP machine     Vitamin B12 deficiency      Past Surgical History:   Procedure Laterality Date     ESOPHAGOGASTRODUODENOSCOPY N/A 3/19/2015    Procedure: ESOPHAGOGASTRODUODENOSCOPY;  Surgeon: Isaac Seay MD;  Location: [facility];  Service:      RI BALLN ANGIOPLASTY PERC,FEM-POP      Description: PTA Femoral-Popliteal;  Recorded: 03/30/2008;  Comments: [facility]     RI EDG US EXAM SURGICAL ALTER STOM DUODENUM/JEJUNUM N/A 5/11/2015    Procedure: ENDOSCOPIC ULTRASOUND;  Surgeon: Marvin NAGEL  MD Vivi;  Location: [facility];  Service: Gastroenterology     DC RECONSTR NOSE      Description: Rhinoplasty;  Recorded: 03/30/2008;  Comments: Post Nasal Fracture.     DC REMOVAL OF TONSILS,<10 Y/O      Description: Tonsillectomy;  Recorded: 03/30/2008;     DC REPAIR ROTATOR CUFF,ACUTE      Description: Rotator Cuff Repair Acute;  Recorded: 03/30/2008;     DC REVISE MEDIAN N/CARPAL TUNNEL SURG      Description: Neuroplasty Decompression Median Nerve At Carpal Tunnel;  Recorded: 03/30/2008;     DC TOTAL KNEE ARTHROPLASTY      Description: Total Knee Arthroplasty;  Recorded: 03/30/2008;  Comments: Work related injury     DC VEIN BYPASS GRAFT,AORTOFEMORAL      Description: Bypass Graft Using Vein: Aortofemoral;  Recorded: 03/30/2008;  Comments: [facility]         REVIEW OF SYSTEMS:  ROS reviewed with pt as documented on pt health form of 6/19/2018.    Negative cardiac, pulmonary, hematological    .  No family hx of anesthetic reactions  .  No family hx of hypercoagulability         MEDICATIONS:  Current Outpatient Prescriptions   Medication Sig Dispense Refill     ACCU-CHEK FASTCLIX USE AS DIRECTED 102 each 3     ACCU-CHEK JOSELIN Misc TEST BLOOD SUGAR EVERY DAY 1 each 0     ACCU-CHEK SMARTVIEW TEST STRIP strips USE AS DIRECTED 100 strip 3     albuterol (PROVENTIL HFA;VENTOLIN HFA) 90 mcg/actuation inhaler Inhale 2 puffs every 4 (four) hours as needed.       blood sugar diagnostic (GLUCOSE BLOOD) Strp Use As Directed. Use as directed       citalopram (CELEXA) 20 MG tablet TAKE 1 TABLET EVERY DAY 90 tablet 1     CONTOUR TEST STRIPS strips Use 1 each As Directed 3 (three) times a day. 100 each 11     cyanocobalamin 1,000 mcg/mL injection Inject 1,000 mcg into the shoulder, thigh, or buttocks every 30 (thirty) days.       cyanocobalamin 1000 MCG tablet Take 1 tablet (1,000 mcg total) by mouth daily. 90 tablet 3     EPINEPHrine (EPIPEN) 0.3 mg/0.3 mL (1:1,000) atIn Inject 0.3 mL (0.3 mg total) into the shoulder,  thigh, or buttocks as needed. 3 Device 3     gabapentin (NEURONTIN) 100 MG capsule Take 100 mg by mouth 3 (three) times a day. 90 capsule 0     glipiZIDE (GLUCOTROL XL) 10 MG 24 hr tablet TAKE TWO TABLETS (=20 MG TOTAL) BY MOUTH IN THE MORNING 180 tablet 0     LANCETS MISC Use As Directed 2 (two) times a day. Use with ros contour twice daily       lisinopril (PRINIVIL,ZESTRIL) 10 MG tablet TAKE 1 TABLET EVERY DAY 90 tablet 2     metFORMIN (GLUCOPHAGE) 1000 MG tablet TAKE 1 TABLET TWICE DAILY 180 tablet 1     pioglitazone (ACTOS) 30 MG tablet Take 1 tablet (30 mg total) by mouth daily. 90 tablet 1     silver sulfADIAZINE (SILVADENE, SSD) 1 % cream Apply to affected area daily 85 g 1     simvastatin (ZOCOR) 80 MG tablet TAKE 1 TABLET AT BEDTIME 90 tablet 2     tiZANidine (ZANAFLEX) 4 MG tablet TAKE ONE TABLET BY MOUTH EVERY EIGHT HOURS AS NEEDED  30 tablet 0     traMADol (ULTRAM) 50 mg tablet Take 1 tablet (50 mg total) by mouth every 6 (six) hours as needed for pain. 30 tablet 0     warfarin (COUMADIN) 2.5 MG tablet Take 1 1/2 tablets (3.75 mg) by mouth daily as directed. Adjust dose per INR results. 135 tablet 1     No current facility-administered medications for this visit.          ALLERGIES/SENSITIVITIES:     Allergies   Allergen Reactions     Penicillins      Annotation: swell up., maybehappened in childhood       Venom-Honey Bee      swelling-has epi pen         PERTINENT SOCIAL HISTORY:  Non-smoker has returned to drinking (6pk /day on weekends)   Social History     Social History     Marital status: Single     Spouse name: N/A     Number of children: N/A     Years of education: N/A     Social History Main Topics     Smoking status: Never Smoker     Smokeless tobacco: Never Used     Alcohol use 3.6 oz/week     6 Cans of beer per week      Comment: hx of heavy drinking, lessened     Drug use: Yes     Special: Oxycodone, Hydrocodone, Marijuana     Sexual activity: Not Asked     Other Topics Concern     None  "    Social History Narrative    Lives with family          FAMILY HISTORY:  Family History   Problem Relation Age of Onset     Cancer Father         PHYSICAL EXAM:   Constitutional: /69  Pulse 95  Ht 5' 9\" (1.753 m)  Wt (!) 261 lb 14.4 oz (118.8 kg)  SpO2 95%  BMI 38.68 kg/m2     Mental Status: A & O in no acute distress.  Affect is appropriate.  Speech is fluent.  Recent and remote memory are intact.  Attention span and concentration are normal.     Cranial Nerves: CN1: grossly intact per patient recall. CN2: No funduscopic exam performed. CN3,4 & 6: Pupillary light response, lateral and vertical gaze normal.  No nystagmus.  Visual fields are full to confrontation. CN5: Intact to touch CN7: No facial weakness, smile, facial symmetry intact. CN8: Intact to spoken voice. CN9&10: Gag reflex, uvula midline, palate rises with phonation. CN11: Shoulder shrug 5/5 intact bilaterally. CN12: Tongue midline and moves freely from side to side.     Motor: No pronator drift of upper extremity. Normal bulk and tone all muscle groups of upper and lower extremities.  weak bilaterally      Sensory: Sensation intact bilaterally to light touch.      Coordination:  Heel/toe/  gait intact.    Unsteady  tandem gait      Reflexes; supinator, biceps, triceps, knee/ ankle jerk absent  no hoffmans/ no     babinski/ clonus.     Shoulder maneuvers causes considerable pain    Hyperextension of neck causes pain    IMAGING: I personally reviewed all radiographic images    MRI canal open.  foramenal stenosis on left at C45 where there is also facet arthropathy some foramenal stenosis at C34 and also C7T1 on left.      Flex/ext      CONSULTATION ASSESSMENT AND PLAN:  Pt has mostly neck L  neck to shoulder pain and a tandem lesion in his left shoulder.   For the neck it may be from C45 but I really can't tell because the radicular type pain would be more c/w either a peripheral neuropathy or brachial plexus.  I will order a CT of the " cervical spine and flex/ext film and an EMG of the arm. (to be ordered today)   If no helpful  information gleaned would do a bone scan to identify a target for injection.  We will need to know where the prior injection was done that didn't help beyond a day.      I spent more than 45 minutes in this apt, examining the pt, reviewing the scans, reviewing notes from chart, discussing treatment options with risks and benefits and coordinating care. >50 % clinic time was spent in face to face counseling and coordinating care    Yelena Fernandez       Cc:   Gilberto Bravo MD  [address] [facility] [address]

## 2021-06-18 NOTE — PROGRESS NOTES
Neurosurgery consultation was requested by: Friend referral, (amado bowen)   Pain: Neck pain   Radicular Pain is present: radiates into left shoulder and left arm   Lhermitte sign: No   Motor complaints: Minor weakness in left arm   Sensory complaints: Numbness and left arm   Gait and balance issues: Denies   Bowel or bladder issues: Denies   Duration of SX is: 1 year   The symptoms are worse with: Moving head   The symptoms are better with: Nothing   Injury: Denies   Severity is: Mild- moderate  Patient has tried the following conservative measures: He had a injection and had some minor immediate relief right away but only last for the day.   NDI score is :   ЕКАТЕРИНА [place]

## 2021-06-18 NOTE — PROGRESS NOTES
GENERAL SURGICAL CONSULTATION    I was requested by Gilberto Bravo MD to consult on this pt to evaluate them for right groin pain.    HPI:  This is a 58 y.o. male here today with right inguinal pain.  He is status post a Right inguinal hernia repair.  He started having  R inguinal pain 9 months ago and it feels a lot like the pain when he has the hernia.  The pain continues to get worse.  He is noting swelling of the subcutaneous tissues of the R thigh.     Allergies:Penicillins and Venom-honey bee    Past Medical History:   Diagnosis Date     Acute pancreatitis      Asthma      Back pain      Chronic diarrhea      Chronic nausea      Chronic pain syndrome      Chronic vomiting      Depression      Diabetes mellitus      DM II (diabetes mellitus, type II), controlled      DVT (deep venous thrombosis)      Embolism and thrombosis of deep vessels of proximal lower extremity      Hyperlipidemia      Hypertension      Insomnia      Microalbuminuria      Peripheral vascular disease      Rectal prolapse      Rotator cuff tendonitis      Sleep apnea     Doesnt use CPAP machine     Vitamin B12 deficiency        Past Surgical History:   Procedure Laterality Date     ESOPHAGOGASTRODUODENOSCOPY N/A 3/19/2015    Procedure: ESOPHAGOGASTRODUODENOSCOPY;  Surgeon: Isaac Seay MD;  Location: [facility];  Service:      CA BALLN ANGIOPLASTY PERC,FEM-POP      Description: PTA Femoral-Popliteal;  Recorded: 03/30/2008;  Comments: [facility]     CA EDG US EXAM SURGICAL ALTER STOM DUODENUM/JEJUNUM N/A 5/11/2015    Procedure: ENDOSCOPIC ULTRASOUND;  Surgeon: Marvin Trinidad MD;  Location: [facility];  Service: Gastroenterology     CA RECONSTR NOSE      Description: Rhinoplasty;  Recorded: 03/30/2008;  Comments: Post Nasal Fracture.     CA REMOVAL OF TONSILS,<12 Y/O      Description: Tonsillectomy;  Recorded: 03/30/2008;     CA REPAIR ROTATOR CUFF,ACUTE      Description: Rotator Cuff Repair Acute;  Recorded:  03/30/2008;     ID REVISE MEDIAN N/CARPAL TUNNEL SURG      Description: Neuroplasty Decompression Median Nerve At Carpal Tunnel;  Recorded: 03/30/2008;     ID TOTAL KNEE ARTHROPLASTY      Description: Total Knee Arthroplasty;  Recorded: 03/30/2008;  Comments: Work related injury     ID VEIN BYPASS GRAFT,AORTOFEMORAL      Description: Bypass Graft Using Vein: Aortofemoral;  Recorded: 03/30/2008;  Comments: [facility]       CURRENT MEDS:  Current Outpatient Prescriptions   Medication Sig Dispense Refill     ACCU-CHEK FASTCLIX USE AS DIRECTED 102 each 3     ACCU-CHEK JOSELIN Misc TEST BLOOD SUGAR EVERY DAY 1 each 0     ACCU-CHEK SMARTVIEW TEST STRIP strips USE AS DIRECTED 100 strip 3     albuterol (PROVENTIL HFA;VENTOLIN HFA) 90 mcg/actuation inhaler Inhale 2 puffs every 4 (four) hours as needed.       blood sugar diagnostic (GLUCOSE BLOOD) Strp Use As Directed. Use as directed       citalopram (CELEXA) 20 MG tablet TAKE 1 TABLET EVERY DAY 90 tablet 1     CONTOUR TEST STRIPS strips Use 1 each As Directed 3 (three) times a day. 100 each 11     cyanocobalamin 1,000 mcg/mL injection Inject 1,000 mcg into the shoulder, thigh, or buttocks every 30 (thirty) days.       cyanocobalamin 1000 MCG tablet Take 1 tablet (1,000 mcg total) by mouth daily. 90 tablet 3     EPINEPHrine (EPIPEN) 0.3 mg/0.3 mL (1:1,000) atIn Inject 0.3 mL (0.3 mg total) into the shoulder, thigh, or buttocks as needed. 3 Device 3     gabapentin (NEURONTIN) 100 MG capsule Take 100 mg by mouth 3 (three) times a day. 90 capsule 0     glipiZIDE (GLUCOTROL XL) 10 MG 24 hr tablet TAKE TWO TABLETS (=20 MG TOTAL) BY MOUTH IN THE MORNING 180 tablet 0     LANCETS MISC Use As Directed 2 (two) times a day. Use with ros contour twice daily       lisinopril (PRINIVIL,ZESTRIL) 10 MG tablet TAKE 1 TABLET EVERY DAY 90 tablet 2     metFORMIN (GLUCOPHAGE) 1000 MG tablet TAKE 1 TABLET TWICE DAILY 180 tablet 1     pioglitazone (ACTOS) 30 MG tablet Take 1 tablet (30 mg  "total) by mouth daily. 90 tablet 1     simvastatin (ZOCOR) 80 MG tablet TAKE 1 TABLET AT BEDTIME 90 tablet 2     tiZANidine (ZANAFLEX) 4 MG tablet Take 1 tablet (4 mg total) by mouth every 8 (eight) hours as needed. 30 tablet 0     traMADol (ULTRAM) 50 mg tablet TAKE 1 TABLET BY MOUTH EVERY 6 HOURS AS NEEDED FOR PAIN 30 tablet 0     warfarin (COUMADIN) 2.5 MG tablet Take 1 1/2 tablets (3.75 mg) by mouth daily as directed. Adjust dose per INR results. 135 tablet 1     No current facility-administered medications for this visit.        Family History   Problem Relation Age of Onset     Cancer Father      Family history is not pertinent to this patients Chief Complaint.     reports that he has never smoked. He has never used smokeless tobacco. He reports that he drinks about 3.6 oz of alcohol per week  He reports that he uses illicit drugs, including Oxycodone, Hydrocodone, and Marijuana.    Review of Systems -   10 point Review of systems is negative except for; as mentioned above in HPI and PMHx    /72  Pulse 77  Resp 18  Ht 5' 9\" (1.753 m)  Wt (!) 264 lb (119.7 kg)  SpO2 97%  BMI 38.99 kg/m2  Body mass index is 38.99 kg/(m^2).    EXAM:  GENERAL: Well developed male, obese  HEENT: EOMI, Anicteric Sclera, Moist Mucous Membranes,  In Mouth the pt does not have redness or bleeding gums  CARDIOVASCULAR: RRR w/out murmur   CHEST/LUNG: Clear to Auscultation  ABDOMEN:  Non tender to palpation, +BS  INGUINAL: No palpable hernia to my exam.    MUSCULOSKELETAL:  Ambulatory but he has a deformed R leg from prior vascular surgeries and ischemia.  NEURO: He is ambulatory with good strength in both legs.  HEME/LYMPH: No Cervical Adenopathy or tenderness.     IMAGES:  None    Assessment/Plan:  58 yr old man with R groin pain that radiates down into the R thigh. He is otherwise doing OK.  I do not feel a hernia on my exam.  I believe a CT scan may help us better understand why he is having this pain.    CT Scan of " pelvis.      Blayne Collins MD  [facility]  [phone]

## 2021-06-18 NOTE — PROGRESS NOTES
"    ASSESSMENT/PLAN:  58-year-old gentleman presented today to review his recent CT abdomen and pelvis (completed on May 14, 2018) that was ordered by Dr. Stephen in the evaluation for right inguinal pain s/p h/o right inguinal hernia repair.    Kidney cyst  CT abdomen and pelvis done on May 14, 2018 showed a lobulated cyst of the upper pole of the left kidney measuring about 3.3 x 2.2 cm. this is in comparison to a CT scan that was done in 2013 which showed a 4 cm cyst at the upper pole of the left kidney \"decrease in size when compared to previous\".  I will obtain a kidney ultrasound.  May need a referral to see nephrology  -     US Kidney Left; Future; Expected date: 5/21/18    Cirrhosis  CT abdomen and pelvis done on May 14, 2018 revealed fatty liver and irregular liver contour consistent with that of cirrhosis.   CT scan done in 2013 did not make mention of cirrhosis.  Hence, I suspect this may be relatively recent.  Patient drinks roughly 6 packs of beer per day.  This was discussed with the patient in detail.  Complete cessation of alcohol was counseled today.  Avoidance of Tylenol was also counseled today.  I will obtain a liver ultrasound.  Hepatitis B and a vaccines are up-to-date.  I will obtain hepatitis C antibody screen.  CMP today as well  -     US Liver; Future; Expected date: 5/21/18    Lymphadenopathy, inguinal  CT scan done May 14, 2018 showed enlarged lymph nodes within the glendy hepatis and gastrohepatic ligament.  This has been seen in his CT scan in 2013 as well.  Moreover, the recent CT scan also showed a slightly enlarged right groin lymph node, which was not mentioned in the CT scan in 2013.  I recommend right inguinal lymph node ultrasound and biopsy.  I will obtain a PSA, CBC, CMP.  I recommend colonoscopy.  -     US Lymph Node Biopsy; Future; Expected date: 5/21/18  -     PSA, Diagnostic (Prostatic-Specific Antigen)  -     Ambulatory referral for Colonoscopy  -     HM2(CBC w/o " Differential)  -     Comprehensive Metabolic Panel    The patient will follow up with me in the next 2-4 weeks to go over results    Orders Placed This Encounter   Procedures     US Kidney Left     Standing Status:   Future     Standing Expiration Date:   5/21/2019     Order Specific Question:   Can the procedure be changed per Radiologist protocol?     Answer:   Yes     US Liver     Standing Status:   Future     Standing Expiration Date:   5/21/2019     Order Specific Question:   Can the procedure be changed per Radiologist protocol?     Answer:   Yes     US Lymph Node Biopsy     Standing Status:   Future     Standing Expiration Date:   5/21/2019     Order Specific Question:   Can the procedure be changed per Radiologist protocol?     Answer:   Yes     Order Specific Question:   What is the patient's sedation requirement?     Answer:   No Sedation     PSA, Diagnostic (Prostatic-Specific Antigen)     HM2(CBC w/o Differential)     Comprehensive Metabolic Panel     Ambulatory referral for Colonoscopy     Referral Priority:   Routine     Referral Type:   Colonoscopy     Requested Specialty:   Gastroenterology     Number of Visits Requested:   1           CHIEF COMPLAINT:  Chief Complaint   Patient presents with     enlarged lymph left  groin     having a lot of pain     mass on liver     shown on CT 5/14/18       HISTORY OF PRESENT ILLNESS:  Brannon is a 58 y.o. male presenting to the clinic today for a follow up for enlarged lymph nodes. Here with his friend, Anisa, today. He did see Dr. Tran on 5/14/2018 for suspect inguinal hernia. He had an abdominal CT scan on 5/14/2018 that did not show a hernia, but right sided enlarged inguinal lymph nodes. The scan also showed some enlarged lymph nodes around the liver, and a left renal cyst. Dr. Tran recommended follow up with is primary physician for this. He is still having pain in his right groin. He also feels like this area has been swollen for him. He describes  the pain as a burning. He has not noticed any rash. He does not follow regularly with a urologist for history of prostate issues. He is moving his bowels regularly. Denies blood in the stool. He does have occasional night sweats. He does drink alcohol daily, but does not think that he is abusing it.       REVIEW OF SYSTEMS:   Constitutional: Negative.   HENT: Negative.   Eyes: Negative.   Respiratory: Negative.   Cardiovascular: Negative.   Gastrointestinal: Negative.   Endocrine: Negative.   Genitourinary: Negative.   Musculoskeletal: Negative.   Skin: Negative.   Neurological: Negative.   Hematological: Negative.   Psychiatric/Behavioral: Negative.   All other systems are negative.    PFSH:  Drinks alcohol every day; drinks a 6 pack per day.     TOBACCO USE:  History   Smoking Status     Never Smoker   Smokeless Tobacco     Never Used       VITALS:  Vitals:    05/21/18 1442   BP: 118/64   Pulse: 99   SpO2: 96%   Weight: (!) 264 lb (119.7 kg)     Wt Readings from Last 3 Encounters:   05/21/18 (!) 264 lb (119.7 kg)   05/14/18 (!) 264 lb (119.7 kg)   05/07/18 (!) 266 lb (120.7 kg)       PHYSICAL EXAM:  Constitutional: Patient is oriented to person, place, and time. Patient appears well-developed and well-nourished. No distress.     Abdominal: Soft.  Patient exhibits no distension and no mass. There is no tenderness. There is no rebound and no guarding. Protrusion on the right upper abdomen compared to left side.    Lymphadenopathy:  No inguinal lymph node palpated.   Neurological: Patient is alert and oriented to person, place, and time.   Skin: Skin is warm and dry. Erythema along right inguinal aspect.    Results for orders placed or performed in visit on 05/21/18   PSA, Diagnostic (Prostatic-Specific Antigen)   Result Value Ref Range    PSA 0.4 0.0 - 3.5 ng/mL   HM2(CBC w/o Differential)   Result Value Ref Range    WBC 5.7 4.0 - 11.0 thou/uL    RBC 4.83 4.40 - 6.20 mill/uL    Hemoglobin 14.7 14.0 - 18.0 g/dL     Hematocrit 44.2 40.0 - 54.0 %    MCV 92 80 - 100 fL    MCH 30.4 27.0 - 34.0 pg    MCHC 33.2 32.0 - 36.0 g/dL    RDW 13.1 11.0 - 14.5 %    Platelets 151 140 - 440 thou/uL    MPV 7.7 7.0 - 10.0 fL   Comprehensive Metabolic Panel   Result Value Ref Range    Sodium 138 136 - 145 mmol/L    Potassium 4.3 3.5 - 5.0 mmol/L    Chloride 102 98 - 107 mmol/L    CO2 24 22 - 31 mmol/L    Anion Gap, Calculation 12 5 - 18 mmol/L    Glucose 97 70 - 125 mg/dL    BUN 10 8 - 22 mg/dL    Creatinine 0.80 0.70 - 1.30 mg/dL    GFR MDRD Af Amer >60 >60 mL/min/1.73m2    GFR MDRD Non Af Amer >60 >60 mL/min/1.73m2    Bilirubin, Total 0.5 0.0 - 1.0 mg/dL    Calcium 9.6 8.5 - 10.5 mg/dL    Protein, Total 7.4 6.0 - 8.0 g/dL    Albumin 4.0 3.5 - 5.0 g/dL    Alkaline Phosphatase 64 45 - 120 U/L    AST 27 0 - 40 U/L    ALT 26 0 - 45 U/L           ADDITIONAL HISTORY SUMMARIZED (2): Reviewed note from Dr. Tran from 5/14/2018 regarding groin pain.  DECISION TO OBTAIN EXTRA INFORMATION (1): None.   RADIOLOGY TESTS (1): Reviewed abdominal CT scan from 5/14/2018. Ordered abdominal US today.   LABS (1): Reviewed and ordered labs today.  MEDICINE TESTS (1): None.  INDEPENDENT REVIEW (2 each): None.     The visit lasted a total of 40 minutes face to face with the patient. Over 50% of the time was spent counseling and educating the patient about the enlarged lymph nodes.    I, April Becerra, am scribing for and in the presence of, Dr. Feliciano.    IGilberto MD , personally performed the services described in this documentation, as scribed by April Becerra in my presence, and it is both accurate and complete.    MEDICATIONS:  Current Outpatient Prescriptions   Medication Sig Dispense Refill     ACCU-CHEK FASTCLIX USE AS DIRECTED 102 each 3     ACCU-CHEK JOSELIN Misc TEST BLOOD SUGAR EVERY DAY 1 each 0     ACCU-CHEK SMARTVIEW TEST STRIP strips USE AS DIRECTED 100 strip 3     albuterol (PROVENTIL HFA;VENTOLIN HFA) 90  mcg/actuation inhaler Inhale 2 puffs every 4 (four) hours as needed.       blood sugar diagnostic (GLUCOSE BLOOD) Strp Use As Directed. Use as directed       citalopram (CELEXA) 20 MG tablet TAKE 1 TABLET EVERY DAY 90 tablet 1     CONTOUR TEST STRIPS strips Use 1 each As Directed 3 (three) times a day. 100 each 11     cyanocobalamin 1,000 mcg/mL injection Inject 1,000 mcg into the shoulder, thigh, or buttocks every 30 (thirty) days.       cyanocobalamin 1000 MCG tablet Take 1 tablet (1,000 mcg total) by mouth daily. 90 tablet 3     EPINEPHrine (EPIPEN) 0.3 mg/0.3 mL (1:1,000) atIn Inject 0.3 mL (0.3 mg total) into the shoulder, thigh, or buttocks as needed. 3 Device 3     gabapentin (NEURONTIN) 100 MG capsule Take 100 mg by mouth 3 (three) times a day. 90 capsule 0     glipiZIDE (GLUCOTROL XL) 10 MG 24 hr tablet TAKE TWO TABLETS (=20 MG TOTAL) BY MOUTH IN THE MORNING 180 tablet 0     LANCETS MISC Use As Directed 2 (two) times a day. Use with ros contour twice daily       lisinopril (PRINIVIL,ZESTRIL) 10 MG tablet TAKE 1 TABLET EVERY DAY 90 tablet 2     metFORMIN (GLUCOPHAGE) 1000 MG tablet TAKE 1 TABLET TWICE DAILY 180 tablet 1     pioglitazone (ACTOS) 30 MG tablet Take 1 tablet (30 mg total) by mouth daily. 90 tablet 1     simvastatin (ZOCOR) 80 MG tablet TAKE 1 TABLET AT BEDTIME 90 tablet 2     tiZANidine (ZANAFLEX) 4 MG tablet Take 1 tablet (4 mg total) by mouth every 8 (eight) hours as needed. 30 tablet 0     traMADol (ULTRAM) 50 mg tablet TAKE 1 TABLET BY MOUTH EVERY 6 HOURS AS NEEDED FOR PAIN 30 tablet 0     warfarin (COUMADIN) 2.5 MG tablet Take 1 1/2 tablets (3.75 mg) by mouth daily as directed. Adjust dose per INR results. 135 tablet 1     No current facility-administered medications for this visit.        Total data points: 4
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
## 2021-06-19 NOTE — LETTER
Letter by Carmina Banks RN at      Author: Carmina Banks RN Service: -- Author Type: --    Filed:  Encounter Date: 4/10/2019 Status: (Other)         Brannon oRbb  [address]  [place] [address]      April 19, 2019      Dear Mr. Robb,    You are currently under the care of [facility] for your warfarin (Coumadin ) therapy. Our records show that your last INR was on 3/20/2019 and you were due to come back on 4/3/2019. We have attempted to reach you by phone to schedule an INR appointment, but have not heard back from you.    The correct dose of warfarin for you may change from time to time based on your INR result. We would like to ensure that your warfarin dose is correct and that you are not having any side effects. It is not safe for you to be on Warfarin if your INR is not checked regularly. If your INR is too high, serious bleeding can occur. If your INR is too low, blood clots can form and lead to heart attack, stroke or a blood clot in the lung.      Getting your INR checked as instructed is required in order for the [facility] to continue managing and refilling your warfarin. We realize that it might be difficult for you to have frequent blood testing, but it is for your own safety.    Please contact us at [phone] as soon as possible to schedule an INR appointment. Our clinic staff is available Monday through Friday 8:00 am to 5:00 pm.  If you have been told to stop taking warfarin or your warfarin is being managed by another healthcare provider, please call and inform our staff.       Sincerely,     [facility]
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
## 2021-06-19 NOTE — LETTER
Letter by Lombardi, Susan L, RN at      Author: Lombardi, Susan L, RN Service: -- Author Type: --    Filed:  Encounter Date: 4/4/2019 Status: (Other)       Dear Brannon,     Thank you for choosing [facility] for your care.  We are committed to providing you with the highest quality and compassionate healthcare services.  The following information pertains to your first appointment with our clinic.    Date/Time of appointment:  Monday, 4-15-19 at 12:45 pm, check in at 12:30 pm.      Note: Please arrive at 12:30 pm.  This allows time to complete forms, possible labs and nursing assessment.    Name of your Physician:  Diane Farmer MD    What to bring to your appointment:    Completed Patient History/Initial Nursing Assessment and Medication/Allergy List (these forms were sent to you).    Any paperwork or films from your physician that we have asked you to bring.    Your current insurance card(s).    Parking:    Please refer to the map included to direct you to [facility].    You can park in any visitor/patient parking area you choose. There is no charge for parking at [facility].     Enter the hospital at the front/main entrance.      Please check in with our  representatives who will escort you to the clinic located in [address] of the [facility].    We hope these instructions are helpful to you.  If you have any questions or concerns, please call us at [phone].  It is our pleasure to assist you.    Warm Regards,          Susan L Lombardi RN BSN [facility]  Nurse Navigator  [phone]

## 2021-06-19 NOTE — LETTER
Letter by Sarah Sommer CNP at      Author: Sarah Sommer CNP Service: -- Author Type: --    Filed:  Encounter Date: 5/13/2019 Status: (Other)         Brannon Robb  [address] [place] [address]         May 13, 2019     Dear Mr. Robb,    Below are the results from your recent visit:    Resulted Orders   Magnesium   Result Value Ref Range    Magnesium 1.9 1.8 - 2.6 mg/dL     Thank you for having your blood work completed. The magnesium looks ok.    Please call with questions or contact us using [employer].    Sincerely,        Electronically signed by Sarah Sommer CNP

## 2021-06-19 NOTE — LETTER
Letter by Christiana Lorenz RN at      Author: Christiana Lorenz RN Service: -- Author Type: --    Filed:  Encounter Date: 10/29/2019 Status: Signed         Brannon Robb  [address]  [place] [address]      November 15, 2019      Dear  Cezar,    You are currently under the care of [facility] for your warfarin (Coumadin ) therapy.  We are contacting you because our records show you were due for an INR on 10/21/2019.    There are potentially serious risks when taking warfarin without careful monitoring and we want to make sure you are safely managed.  Routine INR monitoring is required for warfarin refills.     Please call [phone] as soon as possible to schedule an appointment.  If there has been a change in your care or other concerns, please let us know so we can help and or update our records.     Sincerely,       [facility]

## 2021-06-19 NOTE — PROGRESS NOTES
VASCULAR SURGERY CLINIC CONSULTATION    VASCULAR SURGEON: Nicki Brady MD    LOCATION:  [facility]    Brannon Robb   Medical Record #:  [id number]  YOB: 1960  Age:  58 y.o.     Date of Service: 8/1/2018    PRIMARY CARE PROVIDER: Gilberto Bravo MD      Reason for visit: Evaluation of right leg fem-tib bypass.    IMPRESSION: Clinically doing well with no symptoms of claudication or rest pain.  Duplex shows widely patent bypass graft and normal ABIs.  Patient does have severe swelling in that leg that is chronic.  Try to get him compression stockings but even his new insurance requires him to be $600 out of pocket.  He feels he is doing well enough to avoid buying them.  Compliant with his Coumadin and statin.  Does not smoke    RECOMMENDATION/RISKS: Fairly good prognosis given that he is compliant and his bypass has been open along time.  That said it is a synthetic bypass with vein cuff and so inherently has lower expected patency.  Return visit in 1 year time with a repeat duplex and ABIs.  Continue his Coumadin and statin.    HPI:  Brannon Robb is a 58 y.o. male who was seen today in follow-up for his peripheral vascular disease.  He has had prior remote synthetic right leg tibial bypass by Dr. Wes Fernandes.  Has been doing well ever since.  He has long-standing severe swelling in that limb but this is stable.  He had changed insurance companies and when I last saw him we thought about trying to get him approved for compression stockings, but this did not work out and the co-pay is too high for him to afford.  Overall he feels like he is doing very well.  Is compliant with his medications including his Coumadin which he takes to keep the graft open as well as a statin.    No other new health issues.    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)      Hyperlipidemia      Hypertension      Insomnia      Microalbuminuria      Peripheral vascular disease (H)      Rectal prolapse      Rotator cuff tendonitis      Sleep apnea     Doesnt use CPAP machine     Vitamin B12 deficiency         Past Surgical History:   Procedure Laterality Date     ESOPHAGOGASTRODUODENOSCOPY N/A 3/19/2015    Procedure: ESOPHAGOGASTRODUODENOSCOPY;  Surgeon: Isaac Seay MD;  Location: [facility];  Service:      NE BALLN ANGIOPLASTY PERC,FEM-POP      Description: PTA Femoral-Popliteal;  Recorded: 03/30/2008;  Comments: [facility]     NE EDG US EXAM SURGICAL ALTER STOM DUODENUM/JEJUNUM N/A 5/11/2015    Procedure: ENDOSCOPIC ULTRASOUND;  Surgeon: Marvin Trinidad MD;  Location: [facility];  Service: Gastroenterology     NE RECONSTR NOSE      Description: Rhinoplasty;  Recorded: 03/30/2008;  Comments: Post Nasal Fracture.     NE REMOVAL OF TONSILS,<11 Y/O      Description: Tonsillectomy;  Recorded: 03/30/2008;     NE REPAIR ROTATOR CUFF,ACUTE      Description: Rotator Cuff Repair Acute;  Recorded: 03/30/2008;     NE REVISE MEDIAN N/CARPAL TUNNEL SURG      Description: Neuroplasty Decompression Median Nerve At Carpal Tunnel;  Recorded: 03/30/2008;     NE TOTAL KNEE ARTHROPLASTY      Description: Total Knee Arthroplasty;  Recorded: 03/30/2008;  Comments: Work related injury     NE VEIN BYPASS GRAFT,AORTOFEMORAL      Description: Bypass Graft Using Vein: Aortofemoral;  Recorded: 03/30/2008;  Comments: [facility]       ALLERGIES:  Penicillins and Venom-honey bee    MEDS:    Current Outpatient Prescriptions:       ACCU-CHEK FASTCLIX, USE AS DIRECTED, Disp: 102 each, Rfl: 3     ACCU-CHEK JOSELIN Misc, TEST BLOOD SUGAR EVERY DAY, Disp: 1 each, Rfl: 0     ACCU-CHEK SMARTVIEW TEST STRIP strips, USE AS DIRECTED, Disp: 100 strip, Rfl: 3     albuterol (PROVENTIL HFA;VENTOLIN HFA) 90 mcg/actuation inhaler, Inhale 2 puffs every 4 (four) hours as needed., Disp: , Rfl:      blood sugar diagnostic (GLUCOSE BLOOD) Strp, Use As Directed. Use as directed, Disp: , Rfl:      citalopram (CELEXA) 20 MG tablet, TAKE 1 TABLET EVERY DAY, Disp: 90 tablet, Rfl: 1     CONTOUR TEST STRIPS strips, Use 1 each As Directed 3 (three) times a day., Disp: 100 each, Rfl: 11     cyanocobalamin 1,000 mcg/mL injection, Inject 1,000 mcg into the shoulder, thigh, or buttocks every 30 (thirty) days., Disp: , Rfl:      cyanocobalamin 1000 MCG tablet, Take 1 tablet (1,000 mcg total) by mouth daily., Disp: 90 tablet, Rfl: 3     EPINEPHrine (EPIPEN) 0.3 mg/0.3 mL (1:1,000) atIn, Inject 0.3 mL (0.3 mg total) into the shoulder, thigh, or buttocks as needed., Disp: 3 Device, Rfl: 3     gabapentin (NEURONTIN) 100 MG capsule, Take 100 mg by mouth 3 (three) times a day., Disp: 270 capsule, Rfl: 2     glipiZIDE (GLUCOTROL XL) 10 MG 24 hr tablet, Take 2 tablets (20 mg total) by mouth daily., Disp: 180 tablet, Rfl: 1     LANCETS MISC, Use As Directed 2 (two) times a day. Use with ros contour twice daily, Disp: , Rfl:      lisinopril (PRINIVIL,ZESTRIL) 10 MG tablet, TAKE 1 TABLET EVERY DAY, Disp: 90 tablet, Rfl: 2     metFORMIN (GLUCOPHAGE) 1000 MG tablet, TAKE 1 TABLET TWICE DAILY, Disp: 180 tablet, Rfl: 1     pioglitazone (ACTOS) 30 MG tablet, Take 1 tablet (30 mg total) by mouth daily., Disp: 90 tablet, Rfl: 1     silver sulfADIAZINE (SILVADENE, SSD) 1 % cream, Apply to affected area daily, Disp: 85 g, Rfl: 1     simvastatin (ZOCOR) 80 MG tablet, TAKE 1 TABLET AT BEDTIME, Disp: 90 tablet, Rfl: 2     tiZANidine (ZANAFLEX) 4 MG tablet, TAKE 1 TABLET BY MOUTH EVERY EIGHT  HOURS AS NEEDED, Disp: 30 tablet, Rfl: 0     traMADol (ULTRAM) 50 mg tablet, TAKE 1 TABLET BY MOUTH EVERY 6 HOURS AS NEEDED FOR PAIN, Disp: 30 tablet, Rfl: 0     warfarin (COUMADIN) 2.5 MG tablet, Take 1 1/2 tablets (3.75 mg) by mouth daily as directed. Adjust dose per INR results., Disp: 135 tablet, Rfl: 1    SOCIAL HABITS:    History   Smoking Status     Never Smoker   Smokeless Tobacco     Never Used       History   Alcohol Use     3.6 oz/week     6 Cans of beer per week     Comment: hx of heavy drinking, lessened       History   Drug Use     Yes     Special: Oxycodone, Hydrocodone, Marijuana       FAMILY HISTORY:    Family History   Problem Relation Age of Onset     Cancer Father            PE:  /66 (Patient Site: Right Arm, Patient Position: Sitting, Cuff Size: Adult Large)  Pulse 78  Temp 98.4  F (36.9  C) (Oral)   Resp 18  Wt Readings from Last 1 Encounters:   07/31/18 (!) 263 lb 3.2 oz (119.4 kg)     There is no height or weight on file to calculate BMI.    EXAM:  GENERAL: This is a well-developed 58 y.o. male who appears his stated age  EYES: Grossly normal.  MOUTH: Buccal mucosa normal   MUSCULOSKELETAL: Grossly normal and both lower extremities are intact.  HEME/LYMPH: No lymphedema  NEUROLOGIC: Focally intact, Alert and oriented x 3.   PSYCH: appropriate affect  INTEGUMENT: No open lesions or ulcers      DIAGNOSTIC STUDIES:     Images:  Xr Cervical Spine Flexion Extension 2-3    Result Date: 7/17/2018  [facility] XR CERVICAL SPINE FLEXION EXTENSION 2 - 3 VWS 7/16/2018 12:24 PM INDICATION: Cervicalgia neck pain. COMPARISON: 07/02/2018. FINDINGS: Cervical spine is not visualized below the C5-C6 level on the flexion view and C6-C7 level on the extension view due to superimposed soft tissues. 2 mm anterolisthesis of C4 on C5 in flexion reduces in extension.          I personally reviewed the images and my interpretation is that the bypass graft is widely patent and ABIs remain  normal..    LABS:      Sodium   Date Value Ref Range Status   05/21/2018 138 136 - 145 mmol/L Final   07/13/2017 141 136 - 145 mmol/L Final   04/06/2017 140 136 - 145 mmol/L Final     Potassium   Date Value Ref Range Status   05/21/2018 4.3 3.5 - 5.0 mmol/L Final   07/13/2017 4.3 3.5 - 5.0 mmol/L Final   04/06/2017 4.7 3.5 - 5.0 mmol/L Final     Chloride   Date Value Ref Range Status   05/21/2018 102 98 - 107 mmol/L Final   07/13/2017 104 98 - 107 mmol/L Final   04/06/2017 102 98 - 107 mmol/L Final     BUN   Date Value Ref Range Status   05/21/2018 10 8 - 22 mg/dL Final   07/13/2017 14 8 - 22 mg/dL Final   04/06/2017 12 8 - 22 mg/dL Final     Creatinine   Date Value Ref Range Status   05/21/2018 0.80 0.70 - 1.30 mg/dL Final   07/13/2017 0.72 0.70 - 1.30 mg/dL Final   04/06/2017 0.78 0.70 - 1.30 mg/dL Final     Hemoglobin   Date Value Ref Range Status   05/21/2018 14.7 14.0 - 18.0 g/dL Final   02/24/2016 13.9 (L) 14.0 - 18.0 g/dL Final   03/25/2015 14.5 14.0 - 18.0 g/dL Final     Platelets   Date Value Ref Range Status   05/21/2018 151 140 - 440 thou/uL Final   02/24/2016 139 (L) 140 - 440 thou/uL Final   03/25/2015 170 140 - 440 thou/uL Final     INR   Date Value Ref Range Status   07/23/2018 1.90 (!) 0.9 - 1.1 Final   07/16/2018 2.40 (!) 0.9 - 1.1 Final   06/30/2018 2.90 (!) 0.9 - 1.1 Final     Nicki Brady MD  VASCULAR SURGERY

## 2021-06-19 NOTE — LETTER
Letter by Sarah Sommer CNP at      Author: Sarah Sommer CNP Service: -- Author Type: --    Filed:  Encounter Date: 5/14/2019 Status: (Other)         Brannon Robb  [address] [place] [address]       May 14, 2019     Dear Mr. Robb,    Below are the results from your recent visit:    Resulted Orders   Vitamin D, Total (25-Hydroxy)   Result Value Ref Range    Vitamin D, Total (25-Hydroxy) 17.7 (L) 30.0 - 80.0 ng/mL    Narrative    Deficiency <10.0 ng/mL  Insufficiency 10.0-29.9 ng/mL  Sufficiency 30.0-80.0 ng/mL  Toxicity (possible) >100.0 ng/mL     Thank you for having the blood work completed. I am ordering Vitamin D for you to begin. Continue on the vitamin D and we will recheck your levels in a few months. It may be assistive to get some sunlight for 15 to 30 minutes a day.     Please call with questions or contact us using [employer].    Sincerely,        Electronically signed by Sarah Sommer CNP

## 2021-06-19 NOTE — LETTER
Letter by Gilberto Arnold MD at      Author: Gilberto Arnold MD Service: -- Author Type: --    Filed:  Encounter Date: 7/25/2019 Status: (Other)         Brannon Robb  [address]  [place] [address]             July 25, 2019         Dear Mr. Robb,    Below are the results from your recent visit:    Resulted Orders   XR Wrist Left 3 or More VWS    Narrative    EXAM: XR WRIST LEFT 3 OR MORE VWS  LOCATION: [facility]  DATE/TIME: 7/23/2019 3:35 PM    INDICATION: pain is about 3 cm PROXIMAL to ulnar styloid, over the ulna. Please have images include the distal ulna.  COMPARISON: Left wrist exam 01/20/2018    FINDINGS: Prior resection of the trapezium with surgical anchor at the base of the first metacarpal, unchanged. Slight widening of the scapholunate interval, unchanged. Clips within the soft tissues adjacent to the distal radius. Tiny spur at the ulnar   styloid, unchanged.  No evidence for fracture.        Enclosed is the wrist x-ray report from July 23.  The radiologist compared the images to previous left  wrist x-ray from January 20, 2018.  He did not see any significant changes.  There was evidence of your  previous wrist surgery.      I like you to continue with the plan we discussed in the clinic.  Use the neoprene splint as you need to  for the next week or so.  I believe that you probably had a soft tissue sprain, which would account for  your symptoms.  Sprain would not produce any findings on the x-ray.     Please call with questions or contact us using [employer].    Sincerely,        Electronically signed by Dr Marshall

## 2021-06-19 NOTE — PROGRESS NOTES
f/u bypass graft patency. LE pain improved. Able to walk 1 mile. Hip and thigh pain being treated as joint problems with injections. Pt on coumadin and statin. Pt has HTN, DM, Hyperlipidemia.

## 2021-06-19 NOTE — LETTER
Letter by Christiana Lorenz RN at      Author: Christiana Lorenz RN Service: -- Author Type: --    Filed:  Encounter Date: 5/28/2019 Status: (Other)         Brannon Robb  [address]  [place] [address]      July 1, 2019      Dear Mr. Robb,    You are currently under the care of [facility] for your warfarin therapy.  We have attempted to contact you several times regarding your warfarin therapy but have not received a return call from you.  Our records show that your last INR was on 5/7/2019 and you were due to come back on: 5/21/2019.     The correct dose of warfarin for you may change from time to time based on your INR result. We would like to ensure that your warfarin dose is correct and that you are not having any side effects. It is not safe for you to be on Warfarin if your INR is not checked regularly. If your INR is too high, serious bleeding can occur. If your INR is too low, blood clots can form and lead to heart attack, stroke or a blood clot in the lung.  Getting your INR checked as instructed is required in order for the [facility] to continue managing and refilling your warfarin.    If you have been told to stop taking warfarin or your warfarin is being managed by another healthcare provider, please call and inform our staff so we can update your records.  If this is not the case, please contact us at [phone] to schedule an INR appointment as soon as possible. Our clinic staff is available Monday through Friday 8:00 am to 5:00 pm.     If we do not hear from you, it may lead to being discharged from the Anticoagulation Management Program.  If this occurs, your [facility] primary care provider would then take over managing your warfarin and you would need to have regular office visits with your provider for warfarin management.    Sincerely,       [facility]

## 2021-06-19 NOTE — PROGRESS NOTES
Pt is here for an Office visit for IMAGING and EMG. He has a constant headache and dizziness started a month ago. He has neck and numbness in his left hand and fingers. He has weakness in both hands.   NDI: 60%  MONIQUE Jacobson MA
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
## 2021-06-19 NOTE — LETTER
Letter by Christiana Lorenz RN at      Author: Christiana Lorenz RN Service: -- Author Type: --    Filed:  Encounter Date: 5/28/2019 Status: (Other)         Brannon Robb  [address] [place] [facility] [address]      June 4, 2019      Dear Mr. Robb,    You are currently under the care of [facility] for your warfarin (Coumadin ) therapy. Our records show that your last INR was on 5/7/2019 and you were due to come back on 5/21/2019. We have attempted to reach you by phone to schedule an INR appointment, but have not heard back from you.    The correct dose of warfarin for you may change from time to time based on your INR result. We would like to ensure that your warfarin dose is correct and that you are not having any side effects. It is not safe for you to be on Warfarin if your INR is not checked regularly. If your INR is too high, serious bleeding can occur. If your INR is too low, blood clots can form and lead to heart attack, stroke or a blood clot in the lung.      Getting your INR checked as instructed is required in order for the [facility] to continue managing and refilling your warfarin. We realize that it might be difficult for you to have frequent blood testing, but it is for your own safety.    Please contact us at [phone] as soon as possible to schedule an INR appointment. Our clinic staff is available Monday through Friday 8:00 am to 5:00 pm.  If you have been told to stop taking warfarin or your warfarin is being managed by another healthcare provider, please call and inform our staff.       Sincerely,     [facility]
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
## 2021-06-20 NOTE — PROGRESS NOTES
[facility] Discharge Summary  Patient Name: Brannon Robb  Date: 10/3/2018  Referral Diagnosis: neck pain  Referring provider: Shalom Fernandez*  Visit Diagnosis:   1. Cervical pain (neck)     2. Decreased range of motion of neck         Goals:  Pt. will be independent with home exercise program in : 4 weeks  Pt. will have improved quality of sleep: with less pain;waking less times/night;in 6 weeks  Pt. will improve posture : and demonstrate posture with minimal to no cuing;in sitting;in standing;in 6 weeks  Patient Turn Head: for driving;for watching traffic;with full ROM;wiith no pain;with less difficulty;in 6 weeks  Patient will look up / down: for reading;for computer work;with partial ROM;with less pain;in 4 weeks;with less difficulty  Pt will: be able to lift 10lbs with no increase of symptoms by 5 weeks.    Patient was seen for initial evaluation on 9/7/18 for physical therapy of neck pain, with 3 canceled and one no show follow up appointments. Patient did not return therefore goals and progress could not be updated and measured.   The patient discontinued therapy, did not return.  No further therapy is required at this time.    Therapy will be discontinued at this time.  The patient will need a new referral to resume physical therapy treatment. Please see below for patient's current status.    Thank you for your referral.  Radha Mota, PT, DPT  10/3/2018   10:23 AM        [facility]   Cervical Thoracic Initial Evaluation    Patient Name: Brannon Robb  Date of evaluation: 9/7/2018  Referral Diagnosis: Neck pain  Referring provider: Shalom Fernandez*  Visit Diagnosis:     ICD-10-CM    1. Cervical pain (neck) M54.2    2. Decreased range of motion of neck R29.898        Assessment:      Patient is a 58 y.o. male that presents with signs and symptoms consistent with L>R neck pain secondary to 2 mm anterolisthesis of C4 on C5 in flexion reduces in extension per  MRI imaging. Patient demonstrates impairments including decreased cervical range of motion and joint mobility, with poor postural awareness and decreased flexibility leading to impaired functional mobility. Patient's functional limitations include turning head left/right up/down, carrying/lifting objects, finding a comfortable sleeping position and rolling over in bed. Patient did have steroid injection ~4 weeks ago and has found tremendous relief, however if his pain does come back he is to contact neurosurgeon for ACDF. Patient reports he is going to have RTC repair to L shoulder next Wednesday, which may impact his therapy sessions. Today patient responded well to patient education and therapeutic exercise.    Patient educated on and demonstrated understanding of nature of impairment, plan of care, patient role and HEP. Patient compliant with PT and prognosis is good. Patient would benefit from skilled PT to progress and improve above impairments.      The POC is dynamic and will be modified on an ongoing basis.  Patient will return to clinic if symptoms persist.  Barriers to achieving goals as noted in the assessment section may affect outcome.  Prognosis to achieve goals is  fair   Pt. is appropriate for skilled PT intervention as outlined in the Plan of Care (POC).  Pt. is a good candidate for skilled PT services to improve pain levels and function.    Goals:  Pt. will be independent with home exercise program in : 4 weeks  Pt. will have improved quality of sleep: with less pain;waking less times/night;in 6 weeks  Pt. will improve posture : and demonstrate posture with minimal to no cuing;in sitting;in standing;in 6 weeks  Patient Turn Head: for driving;for watching traffic;with full ROM;wiith no pain;with less difficulty;in 6 weeks  Patient will look up / down: for reading;for computer work;with partial ROM;with less pain;in 4 weeks;with less difficulty  Pt will: be able to lift 10lbs with no increase of  symptoms by 5 weeks.    Patient's expectations/goals are realistic.    Barriers to Learning or Achieving Goals:  Non- adherence to the home exercise program.  Chronicity of the problem.       Plan / Patient Instructions:        Plan of Care:   Authorization / Certification Start Date: 09/07/18  Authorization / Certification End Date: 12/07/18  Authorization / Certification Number of Visits: 12  Communication with: Referral Source  Patient Related Instruction: Nature of Condition;Treatment plan and rationale;Self Care instruction;Basis of treatment;Body mechanics;Posture;Next steps;Expected outcome  Times per Week: 1-2  Number of Weeks: 6-8  Number of Visits: 12  Discharge Planning: independent with HEP and self management  Therapeutic Exercise: ROM;Stretching;Strengthening  Neuromuscular Reeducation: kinesio tape;posture;core;TNE  Manual Therapy: soft tissue mobilization;myofascial release;joint mobilization;muscle energy  Modalities: traction;TENS    POC and pathology of condition were reviewed with patient.  Pt. is in agreement with the Plan of Care  A Home Exercise Program (HEP) was initiated today.  Pt. was instructed in exercises by PT and patient was given a handout with detailed instructions.    Plan for next visit: scapular and cervical strengthening (surgery prep), postural awareness, breathing techniques, thoracic mobility exercises     Subjective:         History of Present Illness:    Brannon is a 58 y.o. male who presents to therapy today with complaints of neck pain. Date of onset is July 201717 and onset was gradual. Symptoms are constant and not improving. Patient reports he had an injection 4 weeks ago that had helped a lot, but prior to that he had increased symptoms, rolling over in bed had hurt the most. He denies history of similar symptoms. He describes their previous level of function as not limited. Patient is having shoulder surgery on Wednesday for bone spurs and torn, and he will  eventually have neck surgery for fusion of C3-5. Patient does go to the chiropractor often, but hasn't since his injection 4 weeks ago. At night he tosses and turns, no specific way he sleeps, he typically uses 2 pillows but unsure about what works best for him. Before the injection he had headaches all the time. Patient drives 4hrs/day delivering food.    Pain Ratin - had a rough night sleeping  Pain rating at best: 5  Pain rating at worst: 10 - when he does increased activity  Pain description: aching    Functional limitations are described as occurring with:   lifting  looking up, looking down and turning head  reaching at shoulder height and overhead  performing routine daily activities  sitting for about 5 minutes  sleeping    Patient reports benefit from:  heat, long hot showers         Objective:      Note: Items left blank indicates the item was not performed or not indicated at the time of the evaluation.    Patient Outcome Measures :    Neck Disability Score in %: 42   Scores range from 0-100%, where a score of 0% represents minimal pain and maximal function. The minmal clinically important difference is a score reduction of 10%.    Cervical Thoracic Examination  1. Cervical pain (neck)     2. Decreased range of motion of neck       Involved side: Left  Posture Observation:      General sitting posture is  fair.  General standing posture is fair.    Cervical ROM:    Date: 2018     *Indicate scale AROM AROM AROM   Cervical Flexion 40     Cervical Extension 25 P      Right Left Right Left Right Left   Cervical Sidebending 38 38       Cervical Rotation 60 P 60 P       Cervical Protraction      Cervical Retraction      Thoracic Flexion      Thoracic Extension      Thoracic Sidebending         Thoracic Rotation           Strength   Limited testing on L due to RTC issue, but otherwise WFL  Date: 2018     Cervical Myotomes/5 Right Left Right Left Right Left   Cervical Flexion (C1-2)         Cervical  Sidebending (C3)         Shoulder Elevation (C4)         Shoulder Abduction (C5)         Elbow Flexion (C6)         Elbow Extension (C7)         Wrist Flexion (C7)         Wrist Extension (C6)         Thumb abduction (C8)         Finger Abduction (T1)           Sensation   WFL      Reflex Testing  Cervical Dermatomes Right Left UE Reflexes Right Left   Back of the Head (C2)   Biceps (C5-6)     Supraclavicular Fossa (C3)   Brachioradialis (C5-6)     AC Joint (C4)   Triceps (C7-8)     Lateral Biceps (C5)   Landy s test     Palmar Thumb (C6)   LE Reflexes     Palmar 3rd Finger (C7)   Patellar (L3-4)     Palmar 5th Finger (C8)   Achilles (S1-2)     Ulnar Forearm (T1)   Babinski Response         Cervical Special Tests   NT  Cervical Special Tests Right Left UE Nerve Mobility Right Left   Cervical compression   Median nerve     Cervical distraction   Ulnar nerve     Spurling s test   Radial nerve     Shoulder abduction sign   Thoracic outlet     Deep neck flexor endurance test   Carlos     Upper cervical rotation   Adson s     Sharper-Taylor   Cervical rotation lateral flexion     Alar ligament test   Other:     Other:   Other:         Flexibility/Tissue Extensibility: decreased of bilateral UT, levators, cervical paraspinals and suboccipitals  Palpation: TTP pectorals, levators, cervical paraspinals   Passive Mobility-Joint Integrity: Hypomobile.  L>R      Treatment Today      Patient Education: Patient educated on plan of care, prognosis, PT/patient role and HEP. Patient educated on impairments related to condition and reproduction of symptoms. Patient instructed to focus on the small goals and this may be a long process to recovery, and that exercises at home are just as important as coming to therapy. Patient was educated on importance of exercise consistency and activity modification in order to see progress and change. Patient demonstrated and verbalized understanding.     Manual Therapy:  STM cervical paraspinals,  levators, pectorals in supine - TTP but found relief    Exercises:  Exercise #1: supine or seated chin tuck - hold 5 sec x 10  Comment #1: scapular retraction - hold 5 seconds, perform all day long with appropriate posture  Exercise #2: cervical isometrics - flexion, extension, sidebending - hold 5 sec x 10  Comment #2: pec doorway stretch - hold 30 sec x 2  Exercise #3: UT and levator stretch - hold 30 sec x 2      TREATMENT MINUTES COMMENTS   Evaluation 25    Self-care/ Home management     Manual therapy 8 STM cervical paraspinals, levators, pectorals in supine - TTP but found relief     Neuromuscular Re-education     Therapeutic Activity     Therapeutic Exercises 15 See flowsheet; postural review   Gait training     Modality__________________                Total 48 Patient arrived on time and required 10 min to fill out paperwork   Blank areas are intentional and mean the treatment did not include these items.     PT Evaluation Code: (Please list factors)  Patient History/Comorbidities: DMII, HTN, obesity, neck pain  Examination: decreased joint mobility and range of motion of neck  Clinical Presentation: stable  Clinical Decision Making: low    Patient History/  Comorbidities Examination  (body structures and functions, activity limitations, and/or participation restrictions) Clinical Presentation Clinical Decision Making (Complexity)   No documented Comorbidities or personal factors 1-2 Elements Stable and/or uncomplicated Low   1-2 documented comorbidities or personal factor 3 Elements Evolving clinical presentation with changing characteristics Moderate   3-4 documented comorbidities or personal factors 4 or more Unstable and unpredictable High                Radha Mota, PT  9/7/2018  7:00 AM

## 2021-06-20 NOTE — PROGRESS NOTES
Brannon Robb was last seen on 7/18/2018 for his neck pain as well as HA and left shoulder pain.  He had a Left C45 facet injection done on 8/6/2018.   Today he returns in follow up. He reports resolved HA and improved neck pain. Still gets occasional tingling in his 4th and 5th digits on the left. Gait and balance are normal.  NDI score is 18%  Kalie Hernandes RN, CNRN

## 2021-06-20 NOTE — PROGRESS NOTES
Pt with excellent relief from left C45 facet injection.    Plan PT now to strengthen muscles     If pain returns will See Dr. Tucker for ACDF.    Yelena Fernandez
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
## 2021-06-22 NOTE — TELEPHONE ENCOUNTER
RN sent Rx refill to anticoagulation pool for review.  Penelope Saravia RN, [facility] Refill/Triage, 1/7/2019 11:43 AM

## 2021-06-22 NOTE — TELEPHONE ENCOUNTER
ANTICOAGULATION  MANAGEMENT-Patient Home Monitoring Result    Assessment     Therapeutic INR result of 2.9 (goal INR of 2.0-3.0) received via fax from "[employer] home INR monitoring company.        Previous INR was Supratherapeutic    Brannon Robb last contacted by phone: 12/6/18    Plan     Per home monitoring agreement with patient, patient was NOT contacted regarding therapeutic result today.  Patient is to continue current dose and continue to checking INR with home monitor every 1-2 week(s) per protocol.  ?   Christiana Lorenz RN    Subjective/Objective:      Brannon Robb, a 58 y.o. male  is established on warfarin.     Brannon Robb is using a home INR monitor. Home INR result received via fax today.     Anticoagulation Episode Summary     Current INR goal:   2.0-3.0   TTR:   75.9 % (3.8 y)   Next INR check:   1/17/2019   INR from last check:   2.90 (1/3/2019)   Weekly max warfarin dose:      Target end date:      INR check location:      Preferred lab:      Send INR reminders to:   ANTICOAGULATION POOL A (WBY,WBE,MID,RSC)    Indications    History of DVT (deep vein thrombosis) [Z86.718]           Comments:            Anticoagulation Care Providers     Provider Role Specialty Phone number    Gilberto Arnold MD Referring Family Medicine [phone]

## 2021-06-23 NOTE — TELEPHONE ENCOUNTER
Incoming fax from Patient Self testing- [facility] [employer]    INR date: 2/9    See attached document

## 2021-06-23 NOTE — TELEPHONE ENCOUNTER
ANTICOAGULATION  MANAGEMENT    Assessment     Today's INR result of 2.3 (2/9) is Therapeutic (goal INR of 2.0-3.0)        Warfarin taken as previously instructed    No new diet changes affecting INR    No new medication/supplements affecting INR    Continues to tolerate warfarin with no reported s/s of bleeding or thromboembolism     Previous INR was Subtherapeutic    Plan:     Spoke with Brannon regarding INR result and instructed:     Warfarin Dosing Instructions:  Continue his 5 day hold for surgery on 2/14 then continue current warfarin dose 3.75 mg daily on Mondays, Wednesdays and Fridays; and 2.5 mg daily rest of week  (0 % change)    Instructed patient to follow up no later than: 5-7 days or hospital discharge instructions.    Education provided: importance of therapeutic range, importance of following up for INR monitoring at instructed interval and importance of taking warfarin as instructed    Bryn verbalizes understanding and agrees to warfarin dosing plan.    Instructed to call the [facility] for any changes, questions or concerns. (#[phone])   ?   Christiana Lorenz RN    Subjective/Objective:      Brannon Robb, a 58 y.o. male is on warfarin.     Brannon reports:     Home warfarin dose: verbally confirmed home dose with Bryn and updated on anticoagulation calendar     Missed doses: No     Medication changes:  No     S/S of bleeding or thromboembolism:  No     New Injury or illness:  No     Changes in diet or alcohol consumption:  No     Upcoming surgery, procedure or cardioversion:  Yes: shoulder surgery 2/14/19.    Anticoagulation Episode Summary     Current INR goal:   2.0-3.0   TTR:   75.1 % (3.9 y)   Next INR check:   2/21/2019   INR from last check:   2.30 (2/9/2019)   Weekly max warfarin dose:      Target end date:      INR check location:      Preferred lab:      Send INR reminders to:   ANTICOAGULATION POOL A (WBY,WBE,MID,RSC)    Indications    History of DVT (deep vein thrombosis)  [Z53.632]           Comments:            Anticoagulation Care Providers     Provider Role Specialty Phone number    Gilberto Arnold MD Referring Family Medicine [phone]

## 2021-06-23 NOTE — TELEPHONE ENCOUNTER
ANTICOAGULATION  MANAGEMENT PROGRAM    Brannon Robb is overdue for INR check.  Reminder call made.    Spoke with Bryn and scheduled INR appointment on 2/8/19 .    Christiana Lorenz RN

## 2021-06-23 NOTE — TELEPHONE ENCOUNTER
ANTICOAGULATION  MANAGEMENT    Assessment     Today's INR result of 1.5 (1/17) is Subtherapeutic (goal INR of 2.0-3.0)        Missed dose(s) may be affecting INR    No new diet changes affecting INR    No new medication/supplements affecting INR    Continues to tolerate warfarin with no reported s/s of bleeding or thromboembolism     Previous INR was Therapeutic    Plan:     Spoke with Brannon regarding INR result and instructed:     Warfarin Dosing Instructions:  Continue current warfarin dose 3.75 mg daily Mondays, Wednesdays and Fridays; and 2.5 mg daily rest of week  (0 % change)He continue with dose since last INR check    Instructed patient to follow up no later than: 1 week from today    Education provided: importance of therapeutic range, importance of following up for INR monitoring at instructed interval and importance of taking warfarin as instructed    Bryn verbalizes understanding and agrees to warfarin dosing plan.    Instructed to call the [facility] for any changes, questions or concerns. (#[phone])   ?   Christiana Lorenz RN    Subjective/Objective:      Brannon Robb, a 58 y.o. male is on warfarin.     Brannon reports:     Home warfarin dose: verbally confirmed home dose with Bryn and updated on anticoagulation calendar     Missed doses: Yes: the week of INR drawn missed one day     Medication changes:  No     S/S of bleeding or thromboembolism:  No     New Injury or illness:  No     Changes in diet or alcohol consumption:  No     Upcoming surgery, procedure or cardioversion:  Yes shoulder surgery. Pre op 1/29/19    Anticoagulation Episode Summary     Current INR goal:   2.0-3.0   TTR:   75.8 % (3.8 y)   Next INR check:   1/31/2019   INR from last check:   1.50! (1/17/2019)   Weekly max warfarin dose:      Target end date:      INR check location:      Preferred lab:      Send INR reminders to:   ANTICOAGULATION POOL A (WBY,WBE,MID,RSC)    Indications    History of DVT (deep vein  thrombosis) [Z86.718]           Comments:            Anticoagulation Care Providers     Provider Role Specialty Phone number    Gilberto Arnold MD Referring Family Medicine [phone]

## 2021-06-23 NOTE — PATIENT INSTRUCTIONS - HE
1  Stop metformin 2 days prior to surgery and restart 2 days after surgery    2.  Stop warfarin 5 days prior to surgery    3.  Start lovenox injection 5 days prior to surgery.  Inject 0.6ml (60mg) under the skin eery 12 hours.  Stop lovenox 1 day prior to surgery

## 2021-06-23 NOTE — PATIENT INSTRUCTIONS - HE
Follow-up visit with Dr. Tucker of [facility] (#[phone]) in 4 weeks to discuss injection outcome and determine care plan going forward.    Please be advised that your blood glucose levels may be increased for the next 5-7 days as a result of the steroid you received with your injection today.  It is recommended that you monitor your blood glucose levels closely over the next week and direct any additional questions regarding your blood glucose management to your primary doctor.       DISCHARGE INSTRUCTIONS    During office hours (8:00 a.m.- 4:30 p.m.) questions or concerns may be answered  by calling Spine Navigation Nurses at  [phone].     If you experience any problems after hours  please call [phone] and you will be connected to [facility].     All Patients:    ? You may experience an increase in your symptoms for the first 2 days (It may take anywhere between 2 days- 2 weeks for the steroid to have maximum effect).    ? You may use ice on the injection site, as frequently as 20 minutes each hour if needed.    ? You may take your pain medicine.    ? You may continue taking your regular medication after your injection. If you have had a Medial Branch Block you may resume pain medication once your pain diary is completed.    ? You may shower. No swimming, tub bath or hot tub for 48 hours.  You may remove your bandaid/bandage as soon as you are home.    ? You may resume light activities, as tolerated.    ? Resume your usual diet as tolerated.    ? It is strongly advised that you do not drive for 1-3 hours post injection.    ? If you have had oral sedation:  Do not drive for 8 hours post injection.      ? If you have had IV sedation:  Do not drive for 24 hours post injection.  Do not operate hazardous machinery or make important personal/business decisions for 24 hours.      POSSIBLE STEROID SIDE EFFECTS (If steroid/cortisone was used for your procedure)    -If you  experience these symptoms, it should only last for a short period      Swelling of the legs                Skin redness (flushing)       Mouth (oral) irritation     Blood sugar (glucose) levels              Sweats                      Mood changes    Headache    Sleeplessness         POSSIBLE PROCEDURE SIDE EFFECTS  -Call the Spine Center if you are concerned    Increased Pain             Increased numbness/tingling        Nausea/Vomiting            Bruising/bleeding at site        Redness or swelling                                                Difficulty walking        Weakness             Fever greater than 100.5    *In the event of a severe headache after an epidural steroid injection that is relieved by lying down, please call the [facility] to speak with a clinical staff member*

## 2021-06-23 NOTE — PROGRESS NOTES
NEUROSURGERY CONSULTATION NOTE    1/10/2019     Brannon Robb is a 58 y.o. male who is sent to us in consultation by Gilberto Arnold for evaluation of cervical spondylosis.         CONSULTATION ASSESSMENT AND PLAN:    57 yo male presents with > 1 year of left- sided neck pain. Left C4-5 facet degeneration with mild foraminal narrowing. S/p facet injection with > 5 months relief. Pain returning. Recent injury of left shoulder. Appointment with orthopedics tomorrow. Discussed ACDF vs posterior cervical fusion at C4-5. Favor ACDF. He will undergo a repeat L C4-5 facet injection and f/u with ortho for his shoulder. Return to clinic in one month. He will return to clinic sooner if no relief from facet injection to further discuss surgery.    I spent more than 30 minutes in this apt, examining the pt, reviewing the scans, reviewing notes from chart, discussing treatment options with risks and benefits and coordinating care. >50 % clinic time was spent in face to face counseling and coordinating care    April Tucker        HPI:  57 yo male presents with > 1 year of left- sided neck pain. His neck pain is located primarily over left neck and into the proximal left shoulder. The neck and shoulder pain is improved with heat. Doesn't have a position that makes it better. When turning his head to the left the pain is worsened. No radicular L arm pain, numbness or weakness or right sided symptoms or bowel or bladder dysfunction.     He underwent a L C4-5 facet injection last August. He had significant relief until this last December. His pain is still better than prior to the injection but is starting to escalate.He also complains of a headache.     He recently fell again and injured his left shoulder and right ankle. He has a h/o left shoulder arthritis s/p arthroscopy and he is seeing [facility] tomorrow.     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)      Hyperlipidemia      Hypertension      Insomnia      Microalbuminuria      Peripheral vascular disease (H)      Rectal prolapse      Rotator cuff tendonitis      Sleep apnea     Doesnt use CPAP machine     Vitamin B12 deficiency      Past Surgical History:   Procedure Laterality Date     ESOPHAGOGASTRODUODENOSCOPY N/A 3/19/2015    Procedure: ESOPHAGOGASTRODUODENOSCOPY;  Surgeon: Isaac Seay MD;  Location: [facility];  Service:      NE BALLN ANGIOPLASTY PERC,FEM-POP      Description: PTA Femoral-Popliteal;  Recorded: 03/30/2008;  Comments: [facility]     NE EDG US EXAM SURGICAL ALTER STOM DUODENUM/JEJUNUM N/A 5/11/2015    Procedure: ENDOSCOPIC ULTRASOUND;  Surgeon: Marvin Trinidad MD;  Location: [facility];  Service: Gastroenterology     NE RECONSTR NOSE      Description: Rhinoplasty;  Recorded: 03/30/2008;  Comments: Post Nasal Fracture.     NE REMOVAL OF TONSILS,<13 Y/O      Description: Tonsillectomy;  Recorded: 03/30/2008;     NE REPAIR ROTATOR CUFF,ACUTE      Description: Rotator Cuff Repair Acute;  Recorded: 03/30/2008;     NE REVISE MEDIAN N/CARPAL TUNNEL SURG      Description: Neuroplasty Decompression Median Nerve At Carpal Tunnel;  Recorded: 03/30/2008;     NE TOTAL KNEE ARTHROPLASTY      Description: Total Knee Arthroplasty;  Recorded: 03/30/2008;  Comments: Work related injury     NE VEIN BYPASS GRAFT,AORTOFEMORAL      Description: Bypass Graft Using Vein: Aortofemoral;  Recorded: 03/30/2008;  Comments: [facility]             REVIEW OF SYSTEMS:  ROS reviewed with pt as documented on pt health form of 1/10/2019.    Negative cardiac, pulmonary, hematological with exception of PVD and  neurological as per HPI.  No family hx of anesthetic reactions .  Hx of a DVT.       MEDICATIONS:  Current Outpatient Medications   Medication Sig Dispense Refill     ACCU-CHEK FASTCLIX USE AS DIRECTED 102 each 3     ACCU-CHEK JOSELIN Misc TEST BLOOD SUGAR EVERY DAY 1 each 0     ACCU-CHEK SMARTVIEW TEST STRIP strips USE AS DIRECTED 100 strip 3     albuterol (PROAIR HFA;PROVENTIL HFA;VENTOLIN HFA) 90 mcg/actuation inhaler Inhale 2 puffs every 4 (four) hours as needed. 1 Inhaler 3     blood sugar diagnostic (GLUCOSE BLOOD) Strp Use As Directed. Use as directed       citalopram (CELEXA) 20 MG tablet TAKE 1 TABLET EVERY DAY 90 tablet 1     CONTOUR TEST STRIPS strips Use 1 each As Directed 3 (three) times a day. 100 each 11     cyanocobalamin 1000 MCG tablet Take 1 tablet (1,000 mcg total) by mouth daily. 90 tablet 3     EPINEPHrine (EPIPEN) 0.3 mg/0.3 mL (1:1,000) atIn Inject 0.3 mL (0.3 mg total) into the shoulder, thigh, or buttocks as needed. 3 Device 3     gabapentin (NEURONTIN) 100 MG capsule Take 100 mg by mouth 3 (three) times a day. 270 capsule 2     glipiZIDE (GLUCOTROL XL) 10 MG 24 hr tablet TAKE TWO TABLETS BY MOUTH  DAILY  180 tablet 0     glipiZIDE (GLUCOTROL XL) 10 MG 24 hr tablet TAKE TWO TABLETS BY MOUTH  DAILY  180 tablet 0     LANCETS MISC Use As Directed 2 (two) times a day. Use with ros contour twice daily       lisinopril (PRINIVIL,ZESTRIL) 10 MG tablet TAKE ONE TABLET BY MOUTH ONE TIME DAILY  90 tablet 3     metFORMIN (GLUCOPHAGE) 1000 MG tablet TAKE ONE TABLET BY MOUTH TWICE DAILY  180 tablet 0     pioglitazone (ACTOS) 30 MG tablet TAKE ONE TABLET BY MOUTH  90 tablet 0     pioglitazone (ACTOS) 30 MG tablet TAKE ONE TABLET BY MOUTH  90 tablet 0     silver sulfADIAZINE (SILVADENE, SSD) 1 % cream Apply to affected area daily 85 g 1     simvastatin (ZOCOR) 80 MG tablet Take 1 tablet (80 mg total) by mouth at bedtime. 90 tablet 3     tiZANidine (ZANAFLEX) 4 MG tablet TAKE 1 TABLET BY MOUTH EVERY 8 HOURS  "AS NEEDED 30 tablet 0     traMADol (ULTRAM) 50 mg tablet Take 1 tablet (50 mg total) by mouth daily as needed for pain. 30 tablet 0     traMADol (ULTRAM) 50 mg tablet TAKE ONE TABLET BY MOUTH TWICE A DAY AS NEEDED FOR PAIN  60 tablet 0     warfarin (COUMADIN/JANTOVEN) 2.5 MG tablet Take 1-1 1/2 tablets (2.5-3.75 mg) daily as directed. Adjust dose per INR results. 135 tablet 1     No current facility-administered medications for this visit.          ALLERGIES/SENSITIVITIES:     Allergies   Allergen Reactions     Penicillins      Annotation: swell up., maybehappened in childhood       Venom-Honey Bee      swelling-has epi pen         PERTINENT SOCIAL HISTORY:   Social History     Socioeconomic History     Marital status: Single     Spouse name: Not on file     Number of children: Not on file     Years of education: Not on file     Highest education level: Not on file   Social Needs     Financial resource strain: Not on file     Food insecurity - worry: Not on file     Food insecurity - inability: Not on file     Transportation needs - medical: Not on file     Transportation needs - non-medical: Not on file   Occupational History     Not on file   Tobacco Use     Smoking status: Never Smoker     Smokeless tobacco: Never Used   Substance and Sexual Activity     Alcohol use: Yes     Alcohol/week: 3.6 oz     Types: 6 Cans of beer per week     Comment: hx of heavy drinking, lessened     Drug use: Yes     Types: Oxycodone, Hydrocodone, Marijuana     Sexual activity: Not on file   Other Topics Concern     Not on file   Social History Narrative    Lives with family          FAMILY HISTORY:  Family History   Problem Relation Age of Onset     Cancer Father         PHYSICAL EXAM:   Constitutional: /84   Pulse 72   Resp 18   Ht 5' 8\" (1.727 m)   Wt (!) 267 lb (121.1 kg)   BMI 40.60 kg/m       Mental Status: A & O in no acute distress.  Affect is appropriate.  Speech is fluent.  Recent and remote memory are intact.  " Attention span and concentration are normal.     Cranial Nerves: CN1: grossly intact per patient recall. CN2: No funduscopic exam performed. CN3,4 & 6: Pupillary light response, lateral and vertical gaze normal.  No nystagmus.  Visual fields are full to confrontation. CN5: Intact to touch CN7: No facial weakness, smile, facial symmetry intact. CN8: Intact to spoken voice. CN9&10: Gag reflex, uvula midline, palate rises with phonation. CN11: Shoulder shrug 5/5 intact bilaterally. CN12: Tongue midline and moves freely from side to side.     Motor: No pronator drift of upper extremity. Normal bulk and tone all muscle groups of upper and lower extremities.   5/5 except DF on R 1-2/5, EHL 5/5 (2/2 prior ankle surgery), WF L 4/5 and WE b/l 4+/5 (2/2 prior wrist surgeries)    Sensory: Sensation intact bilaterally to light touch diminished over multiple prior extremity injuries      Coordination:  Unable to heel/toe ambulate or tandem gait 2/2 prior LE trauma      Reflexes; supinator, biceps, triceps, knee/ ankle jerk intact. No hoffmans/ No    babinski/ clonus.    Pain with active and passive ROM left shoulder    IMAGING: I personally reviewed all radiographic images    CT cervical spine and flexion/extension xray: L C4-5 facet degeneration with mild foraminal stenosis      Cc:   Jodie Bravo, Gilberto Prieto MD  [address] [facility] [address]

## 2021-06-23 NOTE — PROGRESS NOTES
Neurosurgery consultation was requested by: Dr. Fernandez for a second opinion for a C4-5 facet arhtropathy  Pain: presents in the left sided neck over the past year, was a gradual onset with no triggers  Radicular Pain is present: in the left shoulder, gets frequent HA  Lhermitte sign: denies  Motor complaints: decreased dexterity on the left  Sensory complaints: denies  Gait and balance issues: seldom imbalance  Bowel or bladder issues: denies  Duration of SX is: chronic  The symptoms are worse with: turning head to the right  The symptoms are better with: sleeping on left side  Injury: denies  Severity is: moderate  Patient has tried the following conservative measures: had a good relief after a left C4-5 LATRICE, PT  NDI score is : 60%  Kalie Hernandes RN, CNRN
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
## 2021-06-24 NOTE — PATIENT INSTRUCTIONS - HE
Plan:   Plan and next steps - please review and refer back to the after visit summary - key pieces are highlighted    Follow up: 3-4 weeks (40minute appt with Ginette SHEPHERD)      Education:  Today you saw Ginette Sommer Nurse Practitioner.    The plan of care was reviewed, justification provided and questions answered.     I recognized you may have participated in treatments in the past. When troubles first start it is often an expectation you will be cured. Once we are looking at a long term experience, when cure is not likely, the achievable outcomes of treatment are adjust and full participation is expected.    Pain is complex and the treatment approaches are complex. Address of pain will focus on non-medication, non-opioid (non-narcotic) medication, noting occasionally opioids (narcotics) are included into a pain management program. There have been many changes over the last several years as it relates to opioid (narcotic) medication and these changes apply to everyone.     Health Maintenance: You will continue to see primary care for your general health care.    Behavioral Medicine: The body accepts sensory input. The input travels to the brain where the brain based on previous experience, education or culture comes up with a response. Since your brain is very powerful you have the opportunity to learn to adjust the response. Research has demonstrated over-and-over that people who are active participants with behavioral medicine have better management of their pain.     Once you have completed and provided the clinic with the intake paperwork you will be scheduled. Please note the first visit is an evaluation only.     Schedule with one of the counselors they will look at chemical dependency history and chronic pain.    Interventional: none specifically at this time    Rehabilitation: We discussed OT and PT to look at strength, endurance, equipment, organization    Integrative Approaches: Acupuncture is an  option.    Medication: [facility] does not assume responsibility for prescribing at the time of a consult. I did not provide you with any prescriptions. Continue with your current provider and they will continue your pain care in the fashion they deem is most appropriate.     Diagnostics: UDT/SWAB collected 2/28/19 results are pending.  UDT/SWAB:  Patient required a random Urine Drug Testing, due to the need to comply with [place] Model Policy Guidelines and CDC Guideline for the use of any controlled substances. This is to ensure that patient is compliant with treatment, and monitor for risks such as diversion, abuse, or any other aberrant behaviors. Patient is either being considered for or taking a controlled substance. Unexpected findings will be discussed and treatment decision may be adjusted. Testing is being implemented across the board randomly w/o bias related to age, race, gender, socioeconomic status or Yarsanism affiliation.     Records: Reviewed to assist with preparation for the office visit and are reflected throughout the note. Patient intake sheet also reviewed.

## 2021-06-24 NOTE — TELEPHONE ENCOUNTER
RN cannot approve Refill Request    RN can NOT refill this medication overdue for office visits and/or labs.    Cristopher Colbert, [facility] Triage/Med Refill 2/21/2019    Requested Prescriptions   Pending Prescriptions Disp Refills     metFORMIN (GLUCOPHAGE) 1000 MG tablet [Pharmacy Med Name: metFORMIN HCl Oral Tablet 1000 MG] 180 tablet 0     Sig: TAKE ONE TABLET BY MOUTH TWICE DAILY    Metformin Refill Protocol Failed - 2/20/2019  8:59 AM       Failed - Microalbumin in last year     Microalbumin, Random Urine   Date Value Ref Range Status   07/13/2017 33.59 (H) 0.00 - 1.99 mg/dL Final                 Passed - Blood pressure in last 12 months    BP Readings from Last 1 Encounters:   01/29/19 126/78            Passed - LFT or AST or ALT in last 12 months    Albumin   Date Value Ref Range Status   01/29/2019 3.8 3.5 - 5.0 g/dL Final     Bilirubin, Total   Date Value Ref Range Status   01/29/2019 0.5 0.0 - 1.0 mg/dL Final     Bilirubin, Direct   Date Value Ref Range Status   03/04/2015 0.2 <=0.5 mg/dL Final     Alkaline Phosphatase   Date Value Ref Range Status   01/29/2019 59 45 - 120 U/L Final     AST   Date Value Ref Range Status   01/29/2019 24 0 - 40 U/L Final     ALT   Date Value Ref Range Status   01/29/2019 21 0 - 45 U/L Final     Protein, Total   Date Value Ref Range Status   01/29/2019 7.2 6.0 - 8.0 g/dL Final               Passed - GFR or Serum Creatinine in last 6 months    GFR MDRD Non Af Amer   Date Value Ref Range Status   01/29/2019 >60 >60 mL/min/1.73m2 Final     GFR MDRD Af Amer   Date Value Ref Range Status   01/29/2019 >60 >60 mL/min/1.73m2 Final            Passed - Visit with PCP or prescribing provider visit in last 6 months or next 3 months    Last office visit with prescriber/PCP: 10/17/2018 OR same dept: 10/17/2018 Gilberto Arnold MD OR same specialty: 10/17/2018 Gilberto Arnold MD Last physical: 1/29/2019 Last MTM visit: Visit date not found         Next  appt within 3 mo: Visit date not found  Next physical within 3 mo: Visit date not found  Prescriber OR PCP: Gilberto Bravo MD  Last diagnosis associated with med order: 1. Diabetes mellitus (H)  - metFORMIN (GLUCOPHAGE) 1000 MG tablet [Pharmacy Med Name: metFORMIN HCl Oral Tablet 1000 MG]; TAKE ONE TABLET BY MOUTH TWICE DAILY   Dispense: 180 tablet; Refill: 0    2. Type 2 diabetes mellitus with complication, without long-term current use of insulin (H)  - metFORMIN (GLUCOPHAGE) 1000 MG tablet [Pharmacy Med Name: metFORMIN HCl Oral Tablet 1000 MG]; TAKE ONE TABLET BY MOUTH TWICE DAILY   Dispense: 180 tablet; Refill: 0     If protocol passes may refill for 12 months if within 3 months of last provider visit (or a total of 15 months).          Passed - A1C in last 6 months    Hemoglobin A1c   Date Value Ref Range Status   01/29/2019 6.3 (H) 3.5 - 6.1 % Final

## 2021-06-24 NOTE — TELEPHONE ENCOUNTER
Controlled Substance Refill Request  Medication:   Requested Prescriptions     Pending Prescriptions Disp Refills     traMADol (ULTRAM) 50 mg tablet [Pharmacy Med Name: traMADol HCl Oral Tablet 50 MG] 60 tablet 0     Sig: TAKE ONE TABLET BY MOUTH TWICE DAILY IF NEEDED FOR PAIN.     Date Last Fill: 1/18/19  Pharmacy: [facility]   Submit electronically to pharmacy    Controlled Substance Agreement on File:   Encounter-Level CSA Scan Date:    There are no encounter-level csa scan date.       Last office visit with primary: 1/29/19

## 2021-06-24 NOTE — TELEPHONE ENCOUNTER
ANTICOAGULATION  MANAGEMENT-Patient Home Monitoring Result    Assessment     Therapeutic INR result of 2.3 (goal INR of 2.0-3.0) received via fax from [employer] home INR monitoring company.        Previous INR was Therapeutic    Brannon Robb last contacted by phone: 2/11/19    Plan     Per home monitoring agreement with patient, patient was NOT contacted regarding therapeutic result today.  Patient is to continue current dose and continue to checking INR with home monitor every 1-2 week(s) per protocol.  ?   Christiana Lorenz RN    Subjective/Objective:      Brannon Robb, a 58 y.o. male  is established on warfarin.     Brannon Robb is using a home INR monitor. Home INR result received via fax today.     Anticoagulation Episode Summary     Current INR goal:   2.0-3.0   TTR:   75.5 % (3.9 y)   Next INR check:   3/13/2019   INR from last check:   2.30 (2/27/2019)   Weekly max warfarin dose:      Target end date:      INR check location:      Preferred lab:      Send INR reminders to:   ANTICOAGULATION POOL A (WBY,WBE,MID,RSC)    Indications    History of DVT (deep vein thrombosis) [Z86.718]           Comments:            Anticoagulation Care Providers     Provider Role Specialty Phone number    Gilberto Arnold MD Referring Family Medicine [phone]

## 2021-06-24 NOTE — TELEPHONE ENCOUNTER
"Anticoagulation Annual Referral Renewal Review    Brannon Robb's chart reviewed for annual renewal of referral to anticoagulation monitoring.        Criteria for anticoagulation nurse and/or pharmacist renewal met   Warfarin indication: DVT Yes , DVT/PE with previous provider documentation patient to be on extended anticoagulation   Current with INR monitoring/compliant Yes Yes   Date of last office visit 1/29/19 Yes, had office visit within last year   Time in Therapeutic Range (TTR) 54.21 % No, TTR < 60 %       Brannon Robb did NOT meet all criteria for anticoagulation management program initiated renewal and requires provider review. Using dot phrase, \".acmrenewalprovider\", please advise if Brannon's anticoagulation management referral should be renewed or if patient should be seen in office to review anticoagulation therapy      Christiana Lorenz RN  10:40 AM      "

## 2021-06-24 NOTE — TELEPHONE ENCOUNTER
Last office visit 1/29/19. Last filled from [facility] 1/19/19. No CSA on file.

## 2021-06-24 NOTE — TELEPHONE ENCOUNTER
Provider Review: Anticoagulation Annual Referral Renewal    ACM Renewal Decision:  Renew ACM warfarin management      INR Range:   Continue management at current INR goal   Anticipated Duration of Therapy (from today):  Long-term anticoagulation      Gilberto Bravo MD  12:46 PM

## 2021-06-25 NOTE — TELEPHONE ENCOUNTER
Controlled Substance Refill Request  Medication:   Requested Prescriptions     Pending Prescriptions Disp Refills     traMADol (ULTRAM) 50 mg tablet [Pharmacy Med Name: traMADol HCl Oral Tablet 50 MG] 60 tablet 0     Sig: TAKE 1 TABLET BY MOUTH TWICE DAILY IF NEEDED FOR PAIN     Date Last Fill: 2/21/19  [facility]: [facility]   Submit electronically to pharmacy  Controlled Substance Agreement on File:   Encounter-Level CSA Scan Date:    There are no encounter-level csa scan date.       Last office visit: Last office visit pertaining to requested medication was 1/29/19.

## 2021-06-25 NOTE — TELEPHONE ENCOUNTER
ANTICOAGULATION  MANAGEMENT    Assessment     Today's INR result of 1.5 is Subtherapeutic (goal INR of 2.0-3.0)        Missed dose(s) may be affecting INR    No new diet changes affecting INR    No new medication/supplements affecting INR    Continues to tolerate warfarin with no reported s/s of bleeding or thromboembolism     Previous INR was Therapeutic    Plan:     Spoke with Brannon regarding INR result and instructed:     Warfarin Dosing Instructions:  Take 5 mg today then continue current warfarin dose 3.75 mg daily on Mondays, Wednesdays and Fridays; and 2.5 mg daily rest of week  (0 % change)    Instructed patient to follow up no later than: 1-2 weeks.    Education provided: importance of therapeutic range, importance of following up for INR monitoring at instructed interval and importance of taking warfarin as instructed    Bryn verbalizes understanding and agrees to warfarin dosing plan.    Instructed to call the [facility] for any changes, questions or concerns. (#[phone])   ?   Christiana Lorenz RN    Subjective/Objective:      Brannon Robb, a 59 y.o. male is on warfarin.     Brannon reports:     Home warfarin dose: verbally confirmed home dose with Bryn and updated on anticoagulation calendar     Missed doses: Yes: Monday     Medication changes:  No     S/S of bleeding or thromboembolism:  No     New Injury or illness:  No     Changes in diet or alcohol consumption:  No     Upcoming surgery, procedure or cardioversion:  No    Anticoagulation Episode Summary     Current INR goal:   2.0-3.0   TTR:   74.9 % (4 y)   Next INR check:   4/3/2019   INR from last check:   1.50! (3/20/2019)   Weekly max warfarin dose:      Target end date:      INR check location:      Preferred lab:      Send INR reminders to:   ANTICOAGULATION POOL A (WBY,WBE,MID,RSC)    Indications    History of DVT (deep vein thrombosis) [Z86.718]           Comments:            Anticoagulation Care Providers     Provider Role  Specialty Phone number    Gilberto Arnold MD Referring Family Medicine [phone]

## 2021-06-25 NOTE — TELEPHONE ENCOUNTER
Patient had refill 135 tab  done 2/28/19 through pain clinic , so wont be able to prescribe the medication or patient wait till Dr gross return after March 29 the  He should call pain clinic for refill     Itzel Mata MD 3/21/2019 5:34 PM
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
## 2021-07-03 NOTE — ADDENDUM NOTE
Addendum Note by Netta Lang CMA at 5/10/2019  8:01 AM     Author: Netta Lang CMA Service: -- Author Type: Certified Medical Assistant    Filed: 5/10/2019 10:21 AM Date of Service: 5/10/2019  8:01 AM Status: Signed    : Netta Lang CMA (Certified Medical Assistant)    Encounter addended by: Netta Lang CMA on: 5/10/2019 10:21 AM      Actions taken: Visit Navigator Flowsheet section accepted, Sign   clinical note

## 2021-07-03 NOTE — ADDENDUM NOTE
Addendum Note by Gilberto Dale MD at 7/3/2019  1:38 PM     Author: Gilberto Dale MD Service: -- Author Type: Physician    Filed: 7/3/2019  1:38 PM Encounter Date: 7/2/2019 Status: Signed    : Gilberto Dale MD (Physician)    Addended by: GILBERTO DALE on: 7/3/2019 01:38 PM        Modules accepted: Orders
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
## 2022-11-04 DIAGNOSIS — Z12.11 COLON CANCER SCREENING: Primary | ICD-10-CM

## 2022-11-15 DIAGNOSIS — Z12.11 COLON CANCER SCREENING: ICD-10-CM

## 2024-11-18 NOTE — PROGRESS NOTES
Patient is here for a follow up with Ginette Sommer CNP for his back, hip and shoulder pain   [FreeTextEntry1] : kevin carlos